# Patient Record
Sex: FEMALE | Race: WHITE | NOT HISPANIC OR LATINO | Employment: FULL TIME | URBAN - METROPOLITAN AREA
[De-identification: names, ages, dates, MRNs, and addresses within clinical notes are randomized per-mention and may not be internally consistent; named-entity substitution may affect disease eponyms.]

---

## 2016-11-08 LAB — TSH SERPL-ACNC: 4.55 UIU/ML (ref 0.35–4.94)

## 2017-11-28 LAB
PAP DATE - QUEST: NORMAL
PAP: NORMAL

## 2017-12-19 ENCOUNTER — TRANSFERRED RECORDS (OUTPATIENT)
Dept: HEALTH INFORMATION MANAGEMENT | Facility: CLINIC | Age: 27
End: 2017-12-19

## 2018-03-26 ENCOUNTER — TRANSFERRED RECORDS (OUTPATIENT)
Dept: HEALTH INFORMATION MANAGEMENT | Facility: CLINIC | Age: 28
End: 2018-03-26

## 2018-07-08 ENCOUNTER — RECORDS - HEALTHEAST (OUTPATIENT)
Dept: ADMINISTRATIVE | Facility: OTHER | Age: 28
End: 2018-07-08

## 2018-09-27 ENCOUNTER — TRANSFERRED RECORDS (OUTPATIENT)
Dept: HEALTH INFORMATION MANAGEMENT | Facility: CLINIC | Age: 28
End: 2018-09-27

## 2018-09-27 LAB
ABO/RH(D): NORMAL
BLD GP AB SCN SERPL QL: NEGATIVE
HBV SURFACE AG SERPL QL IA: NONREACTIVE
HIV 1/2 AGN ABY 4TH GEN WITH REFLEX: NON REACTIVE
RUBELLA ANTIBODY IGG QUANTITATIVE: 2.44 IU/ML
RUBELLA IMMUNITY: NORMAL
TREPONEMA ANTIBODIES: NEGATIVE
TSH SERPL-ACNC: 1.75 MCU/ML

## 2018-09-28 ENCOUNTER — TELEPHONE (OUTPATIENT)
Dept: OBGYN | Facility: CLINIC | Age: 28
End: 2018-09-28

## 2018-09-28 NOTE — TELEPHONE ENCOUNTER
Reason for call:  Other   Patient called regarding (reason for call): appointment  Additional comments: Patient is currently scheduled for a new prenatal visit with Dr. Chacko on 11/02/18. She is in the process of transferring her records from [facility] to [place]. The first day of her last period was 08/15/18. She has an ultrasound scheduled with Monique on 10/10/18 for her 8 week ultrasound. She is a high risk pregnancy. She prefers to establish her prenatal care with Dr. Chacko. She also wanted to schedule her next appointment at 12 weeks with Dr. Chacko but Dr. Chacko will not be in the office during patient's 12 weeks gestation. She made the appointment for 11/02/18 (11 weeks) for now but wants to discuss this with Dr. Chacko's nurse. She has some additional questions and wants to make sure this is a good game plan or if there are any other recommendations.    Phone number to reach patient:  Cell number on file:    Telephone Information:   Mobile [phone]       Best Time:  Any time    Can we leave a detailed message on this number?  YES     Allyn HOSKINS  Central Scheduler

## 2018-10-01 LAB
ERYTHROCYTE [DISTWIDTH] IN BLOOD BY AUTOMATED COUNT: 11.2 %
HCT VFR BLD AUTO: 38.5 %
HEMOGLOBIN: 13.4 G/DL (ref 11.7–15.7)
MCH RBC QN AUTO: 31.1 PG
MCHC RBC AUTO-ENTMCNC: 34.8 G/DL
MCV RBC AUTO: 89 FL
PLATELET # BLD AUTO: 245 10^9/L
RBC # BLD AUTO: 4.31 10^12/L
WBC # BLD AUTO: 11.7 10^9/L

## 2018-10-01 NOTE — TELEPHONE ENCOUNTER
Left pt detailed message.  Let her know that having her first/new prenatal visit with Dr. Chacko at 11 weeks gestation is fine.  Asked her if she has had a pregnancy confirmation through [facility] and had blood work done.  Will route to Dr. Chacko to see if she would like pt to keep her ultrasound appt through [facility] at 8 weeks gestation.    Flor Churchill RN

## 2018-10-02 NOTE — TELEPHONE ENCOUNTER
Yes, the patient can keep her appointment with me for 11/2 and she can plan to have her ultrasound with Monique on 10/10.    Please find out what makes her high risk.  I would like her pregnancy history as well.  Review current medications and problem list. Find out if she has had labs done.

## 2018-10-02 NOTE — TELEPHONE ENCOUNTER
Called and informed PT that her plan for her appointments is fine we will pull the records over after, and the high risk is due to  ankylosing spondylitis, but to get her records from the [facility].  Padma Hurtado CMA

## 2018-10-04 ENCOUNTER — OFFICE VISIT (OUTPATIENT)
Dept: FAMILY MEDICINE | Facility: CLINIC | Age: 28
End: 2018-10-04
Payer: COMMERCIAL

## 2018-10-04 VITALS
OXYGEN SATURATION: 99 % | DIASTOLIC BLOOD PRESSURE: 62 MMHG | RESPIRATION RATE: 20 BRPM | TEMPERATURE: 98.3 F | SYSTOLIC BLOOD PRESSURE: 108 MMHG | HEART RATE: 79 BPM | HEIGHT: 68 IN | WEIGHT: 211.3 LBS | BODY MASS INDEX: 32.02 KG/M2

## 2018-10-04 DIAGNOSIS — R07.0 THROAT PAIN: Primary | ICD-10-CM

## 2018-10-04 DIAGNOSIS — M45.0 ANKYLOSING SPONDYLITIS OF MULTIPLE SITES IN SPINE (H): ICD-10-CM

## 2018-10-04 LAB
DEPRECATED S PYO AG THROAT QL EIA: NORMAL
SPECIMEN SOURCE: NORMAL

## 2018-10-04 PROCEDURE — 87880 STREP A ASSAY W/OPTIC: CPT | Performed by: NURSE PRACTITIONER

## 2018-10-04 PROCEDURE — 87081 CULTURE SCREEN ONLY: CPT | Performed by: NURSE PRACTITIONER

## 2018-10-04 PROCEDURE — 99213 OFFICE O/P EST LOW 20 MIN: CPT | Performed by: NURSE PRACTITIONER

## 2018-10-04 NOTE — PATIENT INSTRUCTIONS
Rhinocort (Budesonide) is safe for cold symptoms with pregnancy.  Neutrogena face wash with Salicylic acid.             Viral Pharyngitis (Sore Throat)    You or your child have pharyngitis (sore throat). This infection is caused by a virus. It can cause throat pain that is worse when swallowing, aching all over, headache, and fever. The infection may be spread by coughing, kissing, or touching others after touching your mouth or nose. Antibiotic medicines do not work against viruses. They are not used for treating this illness.  Home care    If symptoms are severe, you or your child should rest at home. Return to work or school when you or your child feel well enough.     You or your child should drink plenty of fluids to prevent dehydration.    Use throat lozenges or numbing throat sprays to help reduce pain. Gargling with warm salt water will also help reduce throat pain. Dissolve 1/2 teaspoon of salt in 1 glass of warm water. Children can sip on juice or a popsicle. Children 5 years and older can also suck on a lollipop or hard candy.    Don t eat salty or spicy foods or give them to your child. These can be irritating to the throat.  Medicines for a child: You can give your child acetaminophen for fever, fussiness, or discomfort. In babies over 6 months of age, you may use ibuprofen instead of acetaminophen. If your child has chronic liver or kidney disease or ever had a stomach ulcer or GI bleeding, talk with your child s healthcare provider before giving these medicines. Aspirin should never be used by any child under 18 years of age who has a fever. It may cause severe liver damage.  Medicines for an adult: You may use acetaminophen or ibuprofen to control pain or fever, unless another medicine was prescribed for this. If you have chronic liver or kidney disease or ever had a stomach ulcer or GI bleeding, talk with your healthcare provider before using these medicines.  Follow-up care  Follow up with a  healthcare provider or our staff if you or your child are not getting better over the next week.  When to seek medical advice  Call your healthcare provider right away if any of these occur:    Fever as directed by your healthcare provider.  For children, seek care if:  ? Your child is of any age and has repeated fevers above 104 F (40 C).  ? Your child is younger than 2 years of age and has a fever of 100.4 F (38 C) for more than 1 day.  ? Your child is 2 years old or older and has a fever of 100.4 F (38 C) for more than 3 days.    New or worsening ear pain, sinus pain, or headache    Painful lumps in the back of neck    Stiff neck    Lymph nodes are getting larger    Can t swallow liquids, a lot of drooling, or can t open mouth wide due to throat pain    Signs of dehydration, such as very dark urine or no urine, sunken eyes, dizziness    Trouble breathing or noisy breathing    Muffled voice    New rash    Other symptoms are getting worse  Date Last Reviewed: 10/1/2017    3833-1781 [employer]. [address] [place] [address]. All rights reserved. This information is not intended as a substitute for professional medical care. Always follow your healthcare professional's instructions.        Self-Care for Sore Throats    Sore throats happen for many reasons, such as colds, allergies, and infections caused by viruses or bacteria. In any case, your throat becomes red and sore. Your goal for self-care is to reduce your discomfort while giving your throat a chance to heal.  Moisten and soothe your throat  Tips include the following:    Try a sip of water first thing after waking up.    Keep your throat moist by drinking 6 or more glasses of clear liquids every day.    Run a cool-air humidifier in your room overnight.    Avoid cigarette smoke.     Suck on throat lozenges, cough drops, hard candy, ice chips, or frozen fruit-juice bars. Use the sugar-free versions if your diet or medical condition  requires them.  Gargle to ease irritation  Gargling every hour or 2 can ease irritation. Try gargling with 1 of these solutions:    1/4 teaspoon of salt in 1/2 cup of warm water    An over-the-counter anesthetic gargle  Use medicine for more relief  Over-the-counter medicine can reduce sore throat symptoms. Ask your pharmacist if you have questions about which medicine to use:    Ease pain with anesthetic sprays. Aspirin or an aspirin substitute also helps. Remember, never give aspirin to anyone 18 or younger, or if you are already taking blood thinners.     For sore throats caused by allergies, try antihistamines to block the allergic reaction.    Remember: unless a sore throat is caused by a bacterial infection, antibiotics won t help you.  Prevent future sore throats  Prevention tips include the following:    Stop smoking or reduce contact with secondhand smoke. Smoke irritates the tender throat lining.    Limit contact with pets and with allergy-causing substances, such as pollen and mold.    When you re around someone with a sore throat or cold, wash your hands often to keep viruses or bacteria from spreading.    Don t strain your vocal cords.  Call your healthcare provider  Contact your healthcare provider if you have:    A temperature over 101 F (38.3 C)    White spots on the throat    Great difficulty swallowing    Trouble breathing    A skin rash    Recent exposure to someone else with strep bacteria    Severe hoarseness and swollen glands in the neck or jaw   Date Last Reviewed: 8/1/2016 2000-2017 [employer]. [address] [place] [address], [place] [address]. All rights reserved. This information is not intended as a substitute for professional medical care. Always follow your healthcare professional's instructions.

## 2018-10-04 NOTE — PROGRESS NOTES
"  SUBJECTIVE:   Wilma Palacios is a 28 year old female who presents to clinic today for the following health issues:      ENT Symptoms             Symptoms: cc Present Absent Comment   Fever/Chills  x  Chills    Fatigue  x     Muscle Aches   x    Eye Irritation   x    Sneezing   x    Nasal Abdiel/Drg  x     Sinus Pressure/Pain   x    Loss of smell  x     Dental pain   x    Sore Throat x x     Swollen Glands   x    Ear Pain/Fullness   x    Cough  x  nonproductive   Wheeze   x    Chest Pain  x  Mild this morning-tightness   Shortness of breath   x    Rash   x    Other    Nausea-pt is 7 weeks pregnant     Symptom duration:  2 days   Symptom severity:  mild   Treatments tried:  halls, gargle salt water, warm liquids   Contacts:  none     Patient is also experiencing acne and would like to know what is safe for her to use as she is currently pregnant.      Problem list and histories reviewed & adjusted, as indicated.  Additional history: as documented    Patient Active Problem List   Diagnosis     Ankylosing spondylitis of multiple sites in spine (H)     History reviewed. No pertinent surgical history.    Social History   Substance Use Topics     Smoking status: Former Smoker     Smokeless tobacco: Never Used     Alcohol use Yes      Comment: occ     History reviewed. No pertinent family history.        Reviewed and updated as needed this visit by clinical staff       Reviewed and updated as needed this visit by Provider         ROS:  Constitutional, HEENT, cardiovascular, pulmonary, gi and gu systems are negative, except as otherwise noted.    OBJECTIVE:     /62 (BP Location: Right arm, Patient Position: Sitting, Cuff Size: Adult Large)  Pulse 79  Temp 98.3  F (36.8  C) (Tympanic)  Resp 20  Ht 5' 7.91\" (1.725 m)  Wt 211 lb 4.8 oz (95.8 kg)  SpO2 99%  BMI 32.21 kg/m2  Body mass index is 32.21 kg/(m^2).  GENERAL: healthy, alert and no distress  EYES: Eyes grossly normal to inspection, PERRL and conjunctivae and " sclerae normal  HENT: normal cephalic/atraumatic, ear canals and TM's normal, nose and mouth without ulcers or lesions, oropharynx clear, oral mucous membranes moist, tonsillar erythema and sinuses: not tender  NECK: no adenopathy, no asymmetry, masses, or scars and thyroid normal to palpation  RESP: lungs clear to auscultation - no rales, rhonchi or wheezes  CV: regular rates and rhythm, normal S1 S2, no S3 or S4 and no murmur, click or rub  SKIN: no suspicious lesions or rashes  NEURO: Normal strength and tone, mentation intact and speech normal  PSYCH: mentation appears normal, affect normal/bright    Diagnostic Test Results:  Results for orders placed or performed in visit on 10/04/18 (from the past 24 hour(s))   Strep, Rapid Screen   Result Value Ref Range    Specimen Description Throat     Rapid Strep A Screen       NEGATIVE: No Group A streptococcal antigen detected by immunoassay, await culture report.       ASSESSMENT/PLAN:       ICD-10-CM    1. Pharyngitis J02.9 Strep, Rapid Screen     Beta strep group A culture   2. Ankylosing spondylitis of multiple sites in spine (H) M45.0        FUTURE APPOINTMENTS:       - Follow up in 1 week for persistent symptoms, sooner for new or worsening symptoms. See patient instructions.    Patient Instructions     Rhinocort (Budesonide) is safe for cold symptoms with pregnancy.  Neutrogena face wash with Salicylic acid.             Viral Pharyngitis (Sore Throat)    You or your child have pharyngitis (sore throat). This infection is caused by a virus. It can cause throat pain that is worse when swallowing, aching all over, headache, and fever. The infection may be spread by coughing, kissing, or touching others after touching your mouth or nose. Antibiotic medicines do not work against viruses. They are not used for treating this illness.  Home care    If symptoms are severe, you or your child should rest at home. Return to work or school when you or your child feel well  enough.     You or your child should drink plenty of fluids to prevent dehydration.    Use throat lozenges or numbing throat sprays to help reduce pain. Gargling with warm salt water will also help reduce throat pain. Dissolve 1/2 teaspoon of salt in 1 glass of warm water. Children can sip on juice or a popsicle. Children 5 years and older can also suck on a lollipop or hard candy.    Don t eat salty or spicy foods or give them to your child. These can be irritating to the throat.  Medicines for a child: You can give your child acetaminophen for fever, fussiness, or discomfort. In babies over 6 months of age, you may use ibuprofen instead of acetaminophen. If your child has chronic liver or kidney disease or ever had a stomach ulcer or GI bleeding, talk with your child s healthcare provider before giving these medicines. Aspirin should never be used by any child under 18 years of age who has a fever. It may cause severe liver damage.  Medicines for an adult: You may use acetaminophen or ibuprofen to control pain or fever, unless another medicine was prescribed for this. If you have chronic liver or kidney disease or ever had a stomach ulcer or GI bleeding, talk with your healthcare provider before using these medicines.  Follow-up care  Follow up with a healthcare provider or our staff if you or your child are not getting better over the next week.  When to seek medical advice  Call your healthcare provider right away if any of these occur:    Fever as directed by your healthcare provider.  For children, seek care if:  ? Your child is of any age and has repeated fevers above 104 F (40 C).  ? Your child is younger than 2 years of age and has a fever of 100.4 F (38 C) for more than 1 day.  ? Your child is 2 years old or older and has a fever of 100.4 F (38 C) for more than 3 days.    New or worsening ear pain, sinus pain, or headache    Painful lumps in the back of neck    Stiff neck    Lymph nodes are getting  larger    Can t swallow liquids, a lot of drooling, or can t open mouth wide due to throat pain    Signs of dehydration, such as very dark urine or no urine, sunken eyes, dizziness    Trouble breathing or noisy breathing    Muffled voice    New rash    Other symptoms are getting worse  Date Last Reviewed: 10/1/2017    2558-7690 [employer]. [address] [place] [address], [place] [address]. All rights reserved. This information is not intended as a substitute for professional medical care. Always follow your healthcare professional's instructions.        Self-Care for Sore Throats    Sore throats happen for many reasons, such as colds, allergies, and infections caused by viruses or bacteria. In any case, your throat becomes red and sore. Your goal for self-care is to reduce your discomfort while giving your throat a chance to heal.  Moisten and soothe your throat  Tips include the following:    Try a sip of water first thing after waking up.    Keep your throat moist by drinking 6 or more glasses of clear liquids every day.    Run a cool-air humidifier in your room overnight.    Avoid cigarette smoke.     Suck on throat lozenges, cough drops, hard candy, ice chips, or frozen fruit-juice bars. Use the sugar-free versions if your diet or medical condition requires them.  Gargle to ease irritation  Gargling every hour or 2 can ease irritation. Try gargling with 1 of these solutions:    1/4 teaspoon of salt in 1/2 cup of warm water    An over-the-counter anesthetic gargle  Use medicine for more relief  Over-the-counter medicine can reduce sore throat symptoms. Ask your pharmacist if you have questions about which medicine to use:    Ease pain with anesthetic sprays. Aspirin or an aspirin substitute also helps. Remember, never give aspirin to anyone 18 or younger, or if you are already taking blood thinners.     For sore throats caused by allergies, try antihistamines to block the allergic reaction.    Remember:  unless a sore throat is caused by a bacterial infection, antibiotics won t help you.  Prevent future sore throats  Prevention tips include the following:    Stop smoking or reduce contact with secondhand smoke. Smoke irritates the tender throat lining.    Limit contact with pets and with allergy-causing substances, such as pollen and mold.    When you re around someone with a sore throat or cold, wash your hands often to keep viruses or bacteria from spreading.    Don t strain your vocal cords.  Call your healthcare provider  Contact your healthcare provider if you have:    A temperature over 101 F (38.3 C)    White spots on the throat    Great difficulty swallowing    Trouble breathing    A skin rash    Recent exposure to someone else with strep bacteria    Severe hoarseness and swollen glands in the neck or jaw   Date Last Reviewed: 8/1/2016 2000-2017 [employer]. [address] [place] [address], [place] [address]. All rights reserved. This information is not intended as a substitute for professional medical care. Always follow your healthcare professional's instructions.            SALIMA Perales [facility]

## 2018-10-04 NOTE — MR AVS SNAPSHOT
After Visit Summary   10/4/2018    Wilma Palacios    MRN: [id number]           Patient Information     Date Of Birth          1990        Visit Information        Provider Department      10/4/2018 9:20 AM Kasey Marquez APRN [facility]        Today's Diagnoses     Pharyngitis    -  1    Ankylosing spondylitis of multiple sites in spine (H)          Care Instructions    Rhinocort (Budesonide) is safe for cold symptoms with pregnancy.  Neutrogena face wash with Salicylic acid.             Viral Pharyngitis (Sore Throat)    You or your child have pharyngitis (sore throat). This infection is caused by a virus. It can cause throat pain that is worse when swallowing, aching all over, headache, and fever. The infection may be spread by coughing, kissing, or touching others after touching your mouth or nose. Antibiotic medicines do not work against viruses. They are not used for treating this illness.  Home care    If symptoms are severe, you or your child should rest at home. Return to work or school when you or your child feel well enough.     You or your child should drink plenty of fluids to prevent dehydration.    Use throat lozenges or numbing throat sprays to help reduce pain. Gargling with warm salt water will also help reduce throat pain. Dissolve 1/2 teaspoon of salt in 1 glass of warm water. Children can sip on juice or a popsicle. Children 5 years and older can also suck on a lollipop or hard candy.    Don t eat salty or spicy foods or give them to your child. These can be irritating to the throat.  Medicines for a child: You can give your child acetaminophen for fever, fussiness, or discomfort. In babies over 6 months of age, you may use ibuprofen instead of acetaminophen. If your child has chronic liver or kidney disease or ever had a stomach ulcer or GI bleeding, talk with your child s healthcare provider before giving these medicines. Aspirin should never be used by any  child under 18 years of age who has a fever. It may cause severe liver damage.  Medicines for an adult: You may use acetaminophen or ibuprofen to control pain or fever, unless another medicine was prescribed for this. If you have chronic liver or kidney disease or ever had a stomach ulcer or GI bleeding, talk with your healthcare provider before using these medicines.  Follow-up care  Follow up with a healthcare provider or our staff if you or your child are not getting better over the next week.  When to seek medical advice  Call your healthcare provider right away if any of these occur:    Fever as directed by your healthcare provider.  For children, seek care if:  ? Your child is of any age and has repeated fevers above 104 F (40 C).  ? Your child is younger than 2 years of age and has a fever of 100.4 F (38 C) for more than 1 day.  ? Your child is 2 years old or older and has a fever of 100.4 F (38 C) for more than 3 days.    New or worsening ear pain, sinus pain, or headache    Painful lumps in the back of neck    Stiff neck    Lymph nodes are getting larger    Can t swallow liquids, a lot of drooling, or can t open mouth wide due to throat pain    Signs of dehydration, such as very dark urine or no urine, sunken eyes, dizziness    Trouble breathing or noisy breathing    Muffled voice    New rash    Other symptoms are getting worse  Date Last Reviewed: 10/1/2017    3609-4041 [employer]. [address] [place] [address]. All rights reserved. This information is not intended as a substitute for professional medical care. Always follow your healthcare professional's instructions.        Self-Care for Sore Throats    Sore throats happen for many reasons, such as colds, allergies, and infections caused by viruses or bacteria. In any case, your throat becomes red and sore. Your goal for self-care is to reduce your discomfort while giving your throat a chance to heal.  Moisten and soothe your  throat  Tips include the following:    Try a sip of water first thing after waking up.    Keep your throat moist by drinking 6 or more glasses of clear liquids every day.    Run a cool-air humidifier in your room overnight.    Avoid cigarette smoke.     Suck on throat lozenges, cough drops, hard candy, ice chips, or frozen fruit-juice bars. Use the sugar-free versions if your diet or medical condition requires them.  Gargle to ease irritation  Gargling every hour or 2 can ease irritation. Try gargling with 1 of these solutions:    1/4 teaspoon of salt in 1/2 cup of warm water    An over-the-counter anesthetic gargle  Use medicine for more relief  Over-the-counter medicine can reduce sore throat symptoms. Ask your pharmacist if you have questions about which medicine to use:    Ease pain with anesthetic sprays. Aspirin or an aspirin substitute also helps. Remember, never give aspirin to anyone 18 or younger, or if you are already taking blood thinners.     For sore throats caused by allergies, try antihistamines to block the allergic reaction.    Remember: unless a sore throat is caused by a bacterial infection, antibiotics won t help you.  Prevent future sore throats  Prevention tips include the following:    Stop smoking or reduce contact with secondhand smoke. Smoke irritates the tender throat lining.    Limit contact with pets and with allergy-causing substances, such as pollen and mold.    When you re around someone with a sore throat or cold, wash your hands often to keep viruses or bacteria from spreading.    Don t strain your vocal cords.  Call your healthcare provider  Contact your healthcare provider if you have:    A temperature over 101 F (38.3 C)    White spots on the throat    Great difficulty swallowing    Trouble breathing    A skin rash    Recent exposure to someone else with strep bacteria    Severe hoarseness and swollen glands in the neck or jaw   Date Last Reviewed: 8/1/2016 2000-2017 The  "[employer]. [address] [place] [address]. All rights reserved. This information is not intended as a substitute for professional medical care. Always follow your healthcare professional's instructions.                Follow-ups after your visit        Your next 10 appointments already scheduled     2018  3:00 PM CDT   New Prenatal with Rae Chacko MD   [facility]    [address] [address] [place] [id number] [phone]              Who to contact     Normal or non-critical lab and imaging results will be communicated to you by MyChart, letter or phone within 4 business days after the clinic has received the results. If you do not hear from us within 7 days, please contact the clinic through MyChart or phone. If you have a critical or abnormal lab result, we will notify you by phone as soon as possible.  Submit refill requests through [employer] or call your pharmacy and they will forward the refill request to us. Please allow 3 business days for your refill to be completed.          If you need to speak with a  for additional information , please call: [phone]             Additional Information About Your Visit        [employer] Information     [employer] lets you send messages to your doctor, view your test results, renew your prescriptions, schedule appointments and more. To sign up, go to [email or web address] . Click on \"Log in\" on the left side of the screen, which will take you to the Welcome page. Then click on \"Sign up Now\" on the right side of the page.     You will be asked to enter the access code listed below, as well as some personal information. Please follow the directions to create your username and password.     Your access code is: [email or web address]  Expires: 2019  9:40 AM     Your access code will  in 90 days. If you need help or a new code, please call your [place] clinic or " "[phone].        Care EveryWhere ID     This is your Care EveryWhere ID. This could be used by other organizations to access your [facility] medical records  [id number]        Your Vitals Were     Pulse Temperature Respirations Height Pulse Oximetry BMI (Body Mass Index)    79 98.3  F (36.8  C) (Tympanic) 20 5' 7.91\" (1.725 m) 99% 32.21 kg/m2       Blood Pressure from Last 3 Encounters:   10/04/18 108/62    Weight from Last 3 Encounters:   10/04/18 211 lb 4.8 oz (95.8 kg)              We Performed the Following     Strep, Rapid Screen        Primary Care Provider Fax #    Physician No Ref-Primary [phone]       No address on file        Equal Access to Services     MARKUS FRANCIS : Hadii familia abdullahio Dejuan, waaxda luqadaha, qaybta kaalmada adeegyada, nany davila . So [facility] [phone].    ATENCIÓN: Si habla español, tiene a sahni disposición servicios gratuitos de asistencia lingüística. Llame al [phone].    We comply with applicable federal civil rights laws and [place] laws. We do not discriminate on the basis of race, color, national origin, age, disability, sex, sexual orientation, or gender identity.            Thank you!     Thank you for choosing [facility]  for your care. Our goal is always to provide you with excellent care. Hearing back from our patients is one way we can continue to improve our services. Please take a few minutes to complete the written survey that you may receive in the mail after your visit with us. Thank you!             Your Updated Medication List - Protect others around you: Learn how to safely use, store and throw away your medicines at www.disposemymeds.org.          This list is accurate as of 10/4/18  9:40 AM.  Always use your most recent med list.                   Brand Name Dispense Instructions for use Diagnosis    HUMIRA PEN 40 MG/0.8ML pen kit   Generic drug:  adalimumab           PRENATAL VITAMINS PO             "

## 2018-10-05 LAB
BACTERIA SPEC CULT: NORMAL
SPECIMEN SOURCE: NORMAL

## 2018-11-02 ENCOUNTER — PRENATAL OFFICE VISIT (OUTPATIENT)
Dept: OBGYN | Facility: CLINIC | Age: 28
End: 2018-11-02
Payer: COMMERCIAL

## 2018-11-02 VITALS
SYSTOLIC BLOOD PRESSURE: 111 MMHG | WEIGHT: 216.9 LBS | OXYGEN SATURATION: 98 % | HEART RATE: 79 BPM | BODY MASS INDEX: 33.06 KG/M2 | DIASTOLIC BLOOD PRESSURE: 78 MMHG

## 2018-11-02 DIAGNOSIS — O99.280 HYPOTHYROID IN PREGNANCY, ANTEPARTUM: ICD-10-CM

## 2018-11-02 DIAGNOSIS — O09.91 HIGH-RISK PREGNANCY IN FIRST TRIMESTER: ICD-10-CM

## 2018-11-02 DIAGNOSIS — E03.9 HYPOTHYROID IN PREGNANCY, ANTEPARTUM: ICD-10-CM

## 2018-11-02 DIAGNOSIS — N89.8 VAGINAL ODOR: Primary | ICD-10-CM

## 2018-11-02 PROBLEM — M46.1 SACROILIAC INFLAMMATION (H): Status: ACTIVE | Noted: 2018-11-02

## 2018-11-02 PROBLEM — G89.29 CHRONIC CHEST WALL PAIN: Status: ACTIVE | Noted: 2018-11-02

## 2018-11-02 PROBLEM — O09.529 HIGH-RISK PREGNANCY, ELDERLY MULTIGRAVIDA, UNSPECIFIED TRIMESTER: Status: ACTIVE | Noted: 2018-11-02

## 2018-11-02 PROBLEM — F41.9 ANXIETY: Status: ACTIVE | Noted: 2017-12-12

## 2018-11-02 PROBLEM — R07.89 CHRONIC CHEST WALL PAIN: Status: ACTIVE | Noted: 2018-11-02

## 2018-11-02 PROBLEM — M47.899 HLA-B27 SPONDYLOARTHROPATHY: Status: ACTIVE | Noted: 2018-11-02

## 2018-11-02 LAB
GLUCOSE 1H P 50 G GLC PO SERPL-MCNC: 115 MG/DL (ref 60–129)
SPECIMEN SOURCE: NORMAL
TSH SERPL DL<=0.005 MIU/L-ACNC: 1.23 MU/L (ref 0.4–4)
WET PREP SPEC: NORMAL

## 2018-11-02 PROCEDURE — 84443 ASSAY THYROID STIM HORMONE: CPT | Performed by: OBSTETRICS & GYNECOLOGY

## 2018-11-02 PROCEDURE — 82950 GLUCOSE TEST: CPT | Performed by: OBSTETRICS & GYNECOLOGY

## 2018-11-02 PROCEDURE — 99207 ZZC FIRST OB VISIT: CPT | Performed by: OBSTETRICS & GYNECOLOGY

## 2018-11-02 PROCEDURE — 36415 COLL VENOUS BLD VENIPUNCTURE: CPT | Performed by: OBSTETRICS & GYNECOLOGY

## 2018-11-02 PROCEDURE — 87210 SMEAR WET MOUNT SALINE/INK: CPT | Performed by: OBSTETRICS & GYNECOLOGY

## 2018-11-02 RX ORDER — LEVOTHYROXINE SODIUM 112 UG/1
112 TABLET ORAL
COMMUNITY
Start: 2018-07-01 | End: 2020-11-19

## 2018-11-02 RX ORDER — POLYETHYLENE GLYCOL 3350 17 G/17G
17 POWDER, FOR SOLUTION ORAL
COMMUNITY
Start: 2018-10-26 | End: 2018-11-30

## 2018-11-02 RX ORDER — ALBUTEROL SULFATE 90 UG/1
2 AEROSOL, METERED RESPIRATORY (INHALATION)
COMMUNITY
Start: 2017-05-14 | End: 2018-11-02

## 2018-11-02 RX ORDER — ALBUTEROL SULFATE 90 UG/1
2 AEROSOL, METERED RESPIRATORY (INHALATION)
COMMUNITY
Start: 2017-12-19 | End: 2021-09-09

## 2018-11-02 RX ORDER — ALPRAZOLAM 0.5 MG
0.25 TABLET ORAL
COMMUNITY
End: 2018-11-02

## 2018-11-02 RX ORDER — ONDANSETRON 4 MG/1
TABLET, ORALLY DISINTEGRATING ORAL
Refills: 0 | COMMUNITY
Start: 2018-07-30 | End: 2018-11-30

## 2018-11-02 RX ORDER — PRENATAL VIT 91/IRON/FOLIC/DHA 28-975-200
COMBINATION PACKAGE (EA) ORAL
COMMUNITY
Start: 2017-10-19 | End: 2021-01-15

## 2018-11-02 RX ORDER — FLUTICASONE PROPIONATE 50 MCG
1 SPRAY, SUSPENSION (ML) NASAL
COMMUNITY
Start: 2017-12-19 | End: 2021-01-15

## 2018-11-02 ASSESSMENT — ANXIETY QUESTIONNAIRES
7. FEELING AFRAID AS IF SOMETHING AWFUL MIGHT HAPPEN: NOT AT ALL
6. BECOMING EASILY ANNOYED OR IRRITABLE: MORE THAN HALF THE DAYS
2. NOT BEING ABLE TO STOP OR CONTROL WORRYING: SEVERAL DAYS
3. WORRYING TOO MUCH ABOUT DIFFERENT THINGS: SEVERAL DAYS
GAD7 TOTAL SCORE: 7
5. BEING SO RESTLESS THAT IT IS HARD TO SIT STILL: NOT AT ALL
1. FEELING NERVOUS, ANXIOUS, OR ON EDGE: SEVERAL DAYS
IF YOU CHECKED OFF ANY PROBLEMS ON THIS QUESTIONNAIRE, HOW DIFFICULT HAVE THESE PROBLEMS MADE IT FOR YOU TO DO YOUR WORK, TAKE CARE OF THINGS AT HOME, OR GET ALONG WITH OTHER PEOPLE: SOMEWHAT DIFFICULT

## 2018-11-02 ASSESSMENT — PATIENT HEALTH QUESTIONNAIRE - PHQ9
SUM OF ALL RESPONSES TO PHQ QUESTIONS 1-9: 8
5. POOR APPETITE OR OVEREATING: MORE THAN HALF THE DAYS

## 2018-11-02 NOTE — PROGRESS NOTES
28 year old  at 11w2d presents for New OB appointment.  Estimated Date of Delivery: May 22, 2019 by ultrasound which is consistent with LMP.   US from [facility] noted below.      Constipation.   No vaginal bleeding, no leaking fluid, no contractions.      Electronic chart, including labs and imaging reviewed.    Last PHQ-9 score on record= No flowsheet data found.    Obstetric History  Obstetric History       T0      L0     SAB0   TAB0   Ectopic0   Multiple0   Live Births0       # Outcome Date GA Lbr José/2nd Weight Sex Delivery Anes PTL Lv   1 Current                   Gynecologic History  History of sexually transmitted disease:  Remote history of chlamydia, no risk at this time.   History of abnormal Pap: None    Most Recent Immunizations   Administered Date(s) Administered     DTAP (<7y) 1997     HepA, Unspecified 2008     HepB, Unspecified 1995     Hib (PRP-T) 1993     Hpv, Unspecified  2009     Influenza (IIV3) PF 2017     MMR 2003     Meningococcal,unspecified 2006     OPV, trivalent, live 1993     Poliovirus, inactivated (IPV) 1997     TD (ADULT, 7+) 2003     Tdap (Adult) Unspecified Formulation 2013     Varicella 07/15/2004        Patient Active Problem List   Diagnosis     Ankylosing spondylitis of multiple sites in spine (H)     High-risk pregnancy     Allergic rhinitis     Anxiety     Chronic chest wall pain     HLA-B27 spondyloarthropathy     Hypothyroidism     Sacroiliac inflammation (H)     Hypothyroid in pregnancy, antepartum       Current Outpatient Prescriptions   Medication     albuterol (PROAIR HFA/PROVENTIL HFA/VENTOLIN HFA) 108 (90 Base) MCG/ACT inhaler     fluticasone (FLONASE) 50 MCG/ACT spray     HUMIRA PEN 40 MG/0.8ML pen kit     levothyroxine (SYNTHROID/LEVOTHROID) 112 MCG tablet     ondansetron (ZOFRAN-ODT) 4 MG ODT tab     polyethylene glycol (MIRALAX/GLYCOLAX) powder     Prenatal Multivit-Min-Fe-FA  (PRENATAL VITAMINS PO)     terbinafine (LAMISIL) 1 % cream     diclofenac (VOLTAREN) 1 % GEL topical gel     [DISCONTINUED] albuterol (PROAIR HFA/PROVENTIL HFA/VENTOLIN HFA) 108 (90 Base) MCG/ACT inhaler     No current facility-administered medications for this visit.        Allergies   Allergen Reactions     Ascorbate Anaphylaxis     All Fruits.     Nuts Swelling and Nausea and Vomiting     No Clinical Screening - See Comments Unknown     Sodium Hypochlorite Hives     Sulfa Drugs        History  History reviewed. No pertinent past medical history.  History reviewed. No pertinent surgical history.    Social History     Social History     Marital status: Unknown     Spouse name: N/A     Number of children: N/A     Years of education: N/A     Occupational History     Not on file.     Social History Main Topics     Smoking status: Former Smoker     Smokeless tobacco: Never Used     Alcohol use Yes      Comment:  not pg     Drug use: No     Sexual activity: Yes     Partners: Male     Other Topics Concern     Not on file     Social History Narrative       ROS - Please see HPI, otherwise 10pt ROS negative.      Physical Exam  Vitals: /78 (Patient Position: Sitting, Cuff Size: Adult Regular)  Pulse 79  Wt 216 lb 14.4 oz (98.4 kg)  LMP 08/15/2018  SpO2 98%  BMI 33.06 kg/m2  BMI= Body mass index is 33.06 kg/(m^2).  Gen: Alert and oriented times 3, no acute distress.  Well developed, well nourished, pleasant.    Neck: Supple, no masses.  No thyromegaly.  Chest:  Non labored.  Clear to auscultation bilaterally.    Heart: Regular, normal S1, S2.  No murmurs.   Abdomen: Soft, nontender, nondistended.  No palpable masses.    :  Normal female external genitalia, [place] stage V.  No lesions.  Speculum exam reveals a normal vaginal vault, normal cervix.  No abnormal discharge.  Bimanual exam reveals a normal, mobile, nontender uterus, size consistent with dates.  No cervical motion tenderness.  Adnexa nontender with no  palpable masses.   Extremities:  Nontender, no edema.      Labs at [facility] reviewed.      Assessment and Plan:  28 year old  with IUP at 11w2d.        ICD-10-CM    1. Vaginal odor N89.8 Wet prep   2. High-risk pregnancy in first trimester O09.91 Glucose tolerance gest screen 1 hour   3. Hypothyroid in pregnancy, antepartum O99.280 TSH with free T4 reflex    E03.9 TSH with free T4 reflex       Constipation - recommend Miralax and stool softeners.  Supportive care discussed  Hypothyroid - no recent dose change.  Check next visit.   Refer to MFM at next visit for level 2 US and consultation regarding Humira in pregnancy.   Rheumatology- Kassy Duffy at [facility]   Discussed genetic screening options.  Discussed labs and ultrasound,   all questions answered.  Early GCT  needed  Discussed benefits of breastfeeding  Folder given, outlining physician coverage, exercise, weight gain, schedule of visits, routine and indicated ultrasounds, prenatal vitamins and childbirth education.    Body mass index is 33.06 kg/(m^2). -  (BMI >30) 28-40 lbs (BMI <18.5)   Return in 4 weeks for routine OB care      30 minutes was spent face to face with the patient today discussing her history, diagnosis, and follow-up plan as noted above.  Over 50% of the visit was spent in counseling and coordination of care.    Rae Chacko MD FACOG       US OB ANY TRI TV (10/10/2018 4:44 PM)  US OB ANY TRI TV (10/10/2018 4:44 PM)   Impressions Performed At   1. [place], viable, intrauterine pregnancy.    2. Ultrasound BAUTISTA is 19 with a gestational age of 8 weeks 0 days.    3. No adnexal masses are seen.        Chelle Gonzalez MD 10/11/2018 8:44 AM         Counts include 234 beds at the [facility]    [address]     [address]    [facility] [address] [phone]           US OB ANY TRI TV (10/10/2018 4:44 PM)   Narrative Performed At   This result has an attachment that is not available.    Early Pregnancy Ultrasound    Date  of exam: 10/10/2018  Indication for exam: dating  Requesting Provider: Ahsan Dunlap MD    TECHNIQUE:   Transabdominal scan was not performed. Transvaginal scan was performed.    FINDINGS:   The uterus is normal size.   The myometrium is homogenous.  There are no myomas noted.    There is a single, living, intrauterine pregnancy.   The crown-rump length is 1.53 cm, consistent with an ultrasound   gestational age of 8 weeks 0 days.  The yolk sac is identified, appears normal, and measures 3.9 mm.  Fetal heart activity is present with a rate of 162 beats per minute.    Adnexal structures are normal.  Free fluid in the cul de sac: none          Letha Anthony MD - 2018 1:31 PM CDT  [place]  [facility] - Consult Letter  Date of visit: 18     Dr. Kassy Duffy  Rheumatology  [facility]  ()[phone]    Dr. Grace Rios  Obstetrics and Gynecology  [facility]  ()[phone]    Ms. Leyda Palomo, [facility]  (f)[phone]    Dr. Cale Rojas  [facility]    Re: Wilma Palacios   : [birth year]     Dear Colleagues,    We had the pleasure of seeing Wilma Palacios for a Maternal-Fetal Medicine preconception consultation today secondary to her history of ankylosing spondylitis. Wilma and her  are contemplating pregnancy in the near future and would like some guidance on how to manage AS in pregnancy. As you know, Wilma is a 28 year old G0P whose history is as follows    PAST OBSTETRIC HX: Nullip    PAST GYNECOLOGIC HX: Remote history of treated chlamydia. No abnormal pap smears    PAST MEDICAL HX:   ? Ankylosing spondylitis - Wilma had been having severe joint pain (mostly of her pelvis) for years, as well as chest wall tightness. She was formally diagnosed with AS in late 2017 and is followed by Dr. Duffy at the [facility]. Wilma has been on Humira  since her diagnosis and has overall good control of her symptoms. Her last flare was early-April, when her Humira was held due to suspected pregnancy. Her symptoms resolved shortly after resuming Humira.    PAST SURGICAL HX: None    ALLERGIES: Vitamin C causes anaphylaxis, sulfa drugs cause hives    MEDICATIONS: Humira every 2 weeks; synthroid 112mcg daily    SOCIAL HX: No tobacco, alcohol, or illicit drug use.     FAMILY HX: No history of bleeding disorders, birth defects, Down Syndrome, or intellectual disability    REVIEW OF SYSTEMS:  Constitutional: no f/c, stable weight.   CV: no CP, no palpitations  Resp: no SOB/MÁRQUEZ, no cough  GI: No nausea or vomiting. No constipation or diarrhea.    Laboratory studies/Tests: None     Problem list:  1. Ankylosing spondylitis - Women with AS are prone to flares in pregnancy. Data indicate that SA may not be as quiescent in pregnancy as other autoimmune/inflammatory conditions are. The risk of flaring in pregnancy is roughly 25%; patients are at risk for flares in the post-partum period as well. The two main risk factors for flares during pregnancy are active disease at the time of conception and early pregnancy and discontinuation of TNF inhibitors (Humira) in early pregnancy. We strongly recommend that Wilma continue her Humira while pregnant. Humira does cross the placenta and is detectable in cord blood. Therefore, the traditional recommendation is to discontinue Humira at 30 weeks due to the risk of  immune suppression. If Dr. Duffy feels that Wilma would continue to benefit from Humira beyond 30 weeks, Pediatrics should be notified. The administration of live vaccines should be postponed until anti-TNF concentrations in the infant are negative. Short term, high dose prednisone can also be considered for AS flares.    Non pharmacologic interventions (like physical therapy) should also be utilized. We recommended that Wilma begin physical therapy in combination  with home exercises prior to pregnancy. As most of her symptoms are in her pelvic joints, we anticipate that Wilma may experience more discomfort during pregnancy and PT can help to address this. Sydney was concerned that pregnancy may not be safe due to her pelvic pain; we do not feel that this poses a contraindication to pregnancy. Nor is this a contraindication to a trial of labor. Wilma is also working on weight optimization prior to pregnancy.    At Wilma s request we also discussed the risk of ectopic pregnancy (higher in women with a history of chlamydia, unrelated to AS), prenatal diagnosis of AS (not available), and inheritance of ADHD (multifactorial).    Summary of Recommendations  ? Continue Humira in pregnancy. Consider discontinuation at 30 weeks  ? Follow up with primary OB for pregnancy confirmation/ultrasound after positive pregnancy test   ? Return to [facility] for follow up consult once pregnancy is confirmed. Anticipate level 2 ultrasound at 20 weeks  ? Multidisciplinary care with MPP, OB, and Rheumatology in pregnancy    Thank you for allowing us to participate in Wilma s care. We look forward to seeing her in hopefully, the near future.    Warm regards,    Letha Anthony MD  [place]  [facility]  2018 1:31 PM

## 2018-11-02 NOTE — MR AVS SNAPSHOT
After Visit Summary   11/2/2018    Wilma Palacios    MRN: [id number]           Patient Information     Date Of Birth          1990        Visit Information        Provider Department      11/2/2018 3:00 PM Rae Chacko MD [facility] [place] [facility]        Today's Diagnoses     Vaginal odor    -  1    High-risk pregnancy in first trimester        Hypothyroid in pregnancy, antepartum          Care Instructions      Treatment of Nausea and Vomiting in Pregnancy    Stop prenatal vitamin and start a folic acid supplement only until nausea improves (folic acid 400 micrograms by mouth daily)    Ginger capsules 250 mg by mouth daily     Consider acupressure with wrist bands    Vitamin B6 (pyridoxine) 10-25 mg by mouth 3-4 times per day.  This may be taken in combination with doxylamine.  Doxylamine 25 mg by mouth every night before bed.  You may also take doxylamine 12.5 mg (half tab) in the am and in the afternoon as needed.      It is important to stay hydrated.  Please let us know if your symptoms worsen or do not improve with the treatment plan listed above.      You may also take stool softeners (colace, or like medication).          Follow-ups after your visit        Follow-up notes from your care team     Return in about 4 weeks (around 11/30/2018) for OB Visit.      Future tests that were ordered for you today     Open Future Orders        Priority Expected Expires Ordered    TSH with free T4 reflex Routine  11/2/2019 11/2/2018            Who to contact     If you have questions or need follow up information about today's clinic visit or your schedule please contact [facility] directly at [phone].  Normal or non-critical lab and imaging results will be communicated to you by MyChart, letter or phone within 4 business days after the clinic has received the results. If you do not hear from us within 7 days, please contact the clinic through MyChart or phone. If you have  "a critical or abnormal lab result, we will notify you by phone as soon as possible.  Submit refill requests through [employer] or call your pharmacy and they will forward the refill request to us. Please allow 3 business days for your refill to be completed.          Additional Information About Your Visit        [employer] Information     [employer] lets you send messages to your doctor, view your test results, renew your prescriptions, schedule appointments and more. To sign up, go to [email or web address] . Click on \"Log in\" on the left side of the screen, which will take you to the Welcome page. Then click on \"Sign up Now\" on the right side of the page.     You will be asked to enter the access code listed below, as well as some personal information. Please follow the directions to create your username and password.     Your access code is: [email or web address]  Expires: 2019  9:40 AM     Your access code will  in 90 days. If you need help or a new code, please call your [place] clinic or [phone].        Care EveryWhere ID     This is your Care EveryWhere ID. This could be used by other organizations to access your [place] medical records  [id number]        Your Vitals Were     Pulse Last Period Pulse Oximetry BMI (Body Mass Index)          79 08/15/2018 98% 33.06 kg/m2         Blood Pressure from Last 3 Encounters:   18 111/78   10/04/18 108/62    Weight from Last 3 Encounters:   18 216 lb 14.4 oz (98.4 kg)   10/04/18 211 lb 4.8 oz (95.8 kg)              We Performed the Following     Glucose tolerance gest screen 1 hour     Wet prep          Today's Medication Changes          These changes are accurate as of 18  3:44 PM.  If you have any questions, ask your nurse or doctor.               These medicines have changed or have updated prescriptions.        Dose/Directions    albuterol 108 (90 Base) MCG/ACT inhaler   Commonly known as:  PROAIR HFA/PROVENTIL HFA/VENTOLIN HFA   This may have " changed:  Another medication with the same name was removed. Continue taking this medication, and follow the directions you see here.   Changed by:  Rae Chacko MD        Dose:  2 puff   Inhale 2 puffs into the lungs   Refills:  0         Stop taking these medicines if you haven't already. Please contact your care team if you have questions.     ALPRAZolam 0.5 MG tablet   Commonly known as:  XANAX   Stopped by:  Rae Chacko MD                    Primary Care Provider Fax #    Physician No Ref-Primary [phone]       No address on file        Equal Access to Services     [facility]: Hadii aad ku hadasho Soomaali, waaxda luqadaha, qaybta kaalmada adeegyada, waxay idiin hayaan adeisaac khatif davila . So [facility] [phone].    ATENCIÓN: Si habla español, tiene a sahni disposición servicios gratuitos de asistencia lingüística. [facility] [phone].    We comply with applicable federal civil rights laws and [place] laws. We do not discriminate on the basis of race, color, national origin, age, disability, sex, sexual orientation, or gender identity.            Thank you!     Thank you for choosing [facility]  for your care. Our goal is always to provide you with excellent care. Hearing back from our patients is one way we can continue to improve our services. Please take a few minutes to complete the written survey that you may receive in the mail after your visit with us. Thank you!             Your Updated Medication List - Protect others around you: Learn how to safely use, store and throw away your medicines at www.disposemymeds.org.          This list is accurate as of 11/2/18  3:44 PM.  Always use your most recent med list.                   Brand Name Dispense Instructions for use Diagnosis    albuterol 108 (90 Base) MCG/ACT inhaler    PROAIR HFA/PROVENTIL HFA/VENTOLIN HFA     Inhale 2 puffs into the lungs        diclofenac 1 % Gel topical gel    VOLTAREN          fluticasone  50 MCG/ACT spray    FLONASE     Spray 1 spray in nostril        HUMIRA PEN 40 MG/0.8ML pen kit   Generic drug:  adalimumab           levothyroxine 112 MCG tablet    SYNTHROID/LEVOTHROID     Take 112 mcg by mouth        ondansetron 4 MG ODT tab    ZOFRAN-ODT     DIS 1 T ON THE TONGUE Q 8 H PRF NAUSEA OR VOM        polyethylene glycol powder    MIRALAX/GLYCOLAX     Take 17 g by mouth        PRENATAL VITAMINS PO           terbinafine 1 % cream    lamISIL

## 2018-11-02 NOTE — PATIENT INSTRUCTIONS
Treatment of Nausea and Vomiting in Pregnancy    Stop prenatal vitamin and start a folic acid supplement only until nausea improves (folic acid 400 micrograms by mouth daily)    Ginger capsules 250 mg by mouth daily     Consider acupressure with wrist bands    Vitamin B6 (pyridoxine) 10-25 mg by mouth 3-4 times per day.  This may be taken in combination with doxylamine.  Doxylamine 25 mg by mouth every night before bed.  You may also take doxylamine 12.5 mg (half tab) in the am and in the afternoon as needed.      It is important to stay hydrated.  Please let us know if your symptoms worsen or do not improve with the treatment plan listed above.      You may also take stool softeners (colace, or like medication).

## 2018-11-03 ASSESSMENT — ANXIETY QUESTIONNAIRES: GAD7 TOTAL SCORE: 7

## 2018-11-13 ENCOUNTER — HEALTH MAINTENANCE LETTER (OUTPATIENT)
Age: 28
End: 2018-11-13

## 2018-11-30 ENCOUNTER — PRENATAL OFFICE VISIT (OUTPATIENT)
Dept: OBGYN | Facility: CLINIC | Age: 28
End: 2018-11-30
Payer: COMMERCIAL

## 2018-11-30 VITALS
HEART RATE: 92 BPM | BODY MASS INDEX: 32.85 KG/M2 | DIASTOLIC BLOOD PRESSURE: 68 MMHG | SYSTOLIC BLOOD PRESSURE: 103 MMHG | WEIGHT: 215.5 LBS

## 2018-11-30 DIAGNOSIS — M45.0 ANKYLOSING SPONDYLITIS OF MULTIPLE SITES IN SPINE (H): ICD-10-CM

## 2018-11-30 DIAGNOSIS — Z23 NEED FOR PROPHYLACTIC VACCINATION AND INOCULATION AGAINST INFLUENZA: ICD-10-CM

## 2018-11-30 DIAGNOSIS — Z53.9 DIAGNOSIS NOT YET DEFINED: Primary | ICD-10-CM

## 2018-11-30 DIAGNOSIS — E03.9 HYPOTHYROID IN PREGNANCY, ANTEPARTUM: Primary | ICD-10-CM

## 2018-11-30 DIAGNOSIS — O99.280 HYPOTHYROID IN PREGNANCY, ANTEPARTUM: Primary | ICD-10-CM

## 2018-11-30 PROCEDURE — 90686 IIV4 VACC NO PRSV 0.5 ML IM: CPT | Performed by: OBSTETRICS & GYNECOLOGY

## 2018-11-30 PROCEDURE — 99207 ZZC PRENATAL VISIT: CPT | Performed by: OBSTETRICS & GYNECOLOGY

## 2018-11-30 PROCEDURE — 90471 IMMUNIZATION ADMIN: CPT | Performed by: OBSTETRICS & GYNECOLOGY

## 2018-11-30 NOTE — MR AVS SNAPSHOT
After Visit Summary   11/30/2018    Wilma Palacios    MRN: [id number]           Patient Information     Date Of Birth          1990        Visit Information        Provider Department      11/30/2018 9:45 AM Rae Chacko MD [facility] [place]        Today's Diagnoses     Hypothyroid in pregnancy, antepartum    -  1    Ankylosing spondylitis of multiple sites in spine (H)           Follow-ups after your visit        Follow-up notes from your care team     Return in about 4 weeks (around 12/28/2018) for OB Visit.      Who to contact     If you have questions or need follow up information about today's clinic visit or your schedule please contact [facility] directly at [phone].  Normal or non-critical lab and imaging results will be communicated to you by MyChart, letter or phone within 4 business days after the clinic has received the results. If you do not hear from us within 7 days, please contact the clinic through [employer] or phone. If you have a critical or abnormal lab result, we will notify you by phone as soon as possible.  Submit refill requests through [employer] or call your pharmacy and they will forward the refill request to us. Please allow 3 business days for your refill to be completed.          Additional Information About Your Visit        MyChart Information     [employer] gives you secure access to your electronic health record. If you see a primary care provider, you can also send messages to your care team and make appointments. If you have questions, please call your primary care clinic.  If you do not have a primary care provider, please call [phone] and they will assist you.        Care EveryWhere ID     This is your Care EveryWhere ID. This could be used by other organizations to access your [place] medical records  [id number]        Your Vitals Were     Pulse Last Period BMI (Body Mass Index)             92 08/15/2018 32.85 kg/m2           Blood Pressure from Last 3 Encounters:   11/30/18 103/68   11/02/18 111/78   10/04/18 108/62    Weight from Last 3 Encounters:   11/30/18 215 lb 8 oz (97.8 kg)   11/02/18 216 lb 14.4 oz (98.4 kg)   10/04/18 211 lb 4.8 oz (95.8 kg)              Today, you had the following     No orders found for display         Today's Medication Changes          These changes are accurate as of 11/30/18 10:10 AM.  If you have any questions, ask your nurse or doctor.               Stop taking these medicines if you haven't already. Please contact your care team if you have questions.     diclofenac 1 % topical gel   Commonly known as:  VOLTAREN   Stopped by:  Rae Chacko MD           ondansetron 4 MG ODT tab   Commonly known as:  ZOFRAN-ODT   Stopped by:  Rae Chacko MD           polyethylene glycol powder   Commonly known as:  MIRALAX/GLYCOLAX   Stopped by:  Rae Chacko MD                    Primary Care Provider Fax #    Physician No Ref-Primary [phone]       No address on file        Equal Access to Services     [facility]: Hadii familia ku hadasho Soomaali, waaxda luqadaha, qaybta kaalmada adebibiana, nany davila . So [facility] [phone].    ATENCIÓN: Si habla español, tiene a sahni disposición servicios gratuitos de asistencia lingüística. Dalia al [phone].    We comply with applicable federal civil rights laws and [place] laws. We do not discriminate on the basis of race, color, national origin, age, disability, sex, sexual orientation, or gender identity.            Thank you!     Thank you for choosing [facility]  for your care. Our goal is always to provide you with excellent care. Hearing back from our patients is one way we can continue to improve our services. Please take a few minutes to complete the written survey that you may receive in the mail after your visit with us. Thank you!             Your Updated Medication List - Protect others  around you: Learn how to safely use, store and throw away your medicines at www.disposemymeds.org.          This list is accurate as of 11/30/18 10:10 AM.  Always use your most recent med list.                   Brand Name Dispense Instructions for use Diagnosis    albuterol 108 (90 Base) MCG/ACT inhaler    PROAIR HFA/PROVENTIL HFA/VENTOLIN HFA     Inhale 2 puffs into the lungs        fluticasone 50 MCG/ACT nasal spray    FLONASE     Spray 1 spray in nostril        HUMIRA PEN 40 MG/0.8ML pen kit   Generic drug:  adalimumab           levothyroxine 112 MCG tablet    SYNTHROID/LEVOTHROID     Take 112 mcg by mouth        PRENATAL VITAMINS PO           terbinafine 1 % external cream    lamISIL

## 2018-11-30 NOTE — NURSING NOTE
"Chief Complaint   Patient presents with     Prenatal Care       Initial /68 (BP Location: Right arm, Patient Position: Chair, Cuff Size: Adult Regular)  Pulse 92  Wt 215 lb 8 oz (97.8 kg)  LMP 08/15/2018  BMI 32.85 kg/m2 Estimated body mass index is 32.85 kg/(m^2) as calculated from the following:    Height as of 10/4/18: 5' 7.91\" (1.725 m).    Weight as of this encounter: 215 lb 8 oz (97.8 kg).  BP completed using cuff size: large        Maria C Hurtado, ALEX  2018        "

## 2018-11-30 NOTE — PROGRESS NOTES
Presents for routine  appointment.     No complaints.  Constipation resolved.    No LOF/VB/Ctxs.    ROS:   and GI  negative.     Please see Prenatal Vitals and Notes Flowsheet for objective data.    A/P:  28 year old  at 15w2d       ICD-10-CM    1. Hypothyroid in pregnancy, antepartum O99.280     E03.9    2. Ankylosing spondylitis of multiple sites in spine (H) M45.0        Genetic screening - declined.    Hypothyroid - no recent dose changed.  Plan to check with GCT, sooner as needed.    Ankylosing spondylitis - Refer to [facility] for level 2 US and consultation regarding Humira in pregnancy.   Rheumatology- Kassy Duffy at [place]   Follow up in 4  week(s).      Rae Chacko MD

## 2018-11-30 NOTE — PROGRESS NOTES
Component      Latest Ref Rng & Units 11/28/2017 9/27/2018 10/1/2018   WBC      10:9/L   11.7   RBC Count      10:12/L   4.31   Hemoglobin      11.7 - 15.7 g/dL   13.4   Hematocrit      %   38.5   MCV      fl   89   MCH      pg   31.1   MCHC      g/dL   34.8   RDW      %   11.2   Platelet Count      10:9/L   245   RH Type        A Positive    Antibody Screen        negative    Rubella Antibody IgG Quantitative      IU/mL  2.44    Rubella Immunity        immune    Hep B Surface Agn        nonreactive    HIV 1/2 Agn Janet 4th Gen With Reflex        non reactive    Treponema Antibodies        negative    TSH      mcU/mL  1.75    PAP       NIL

## 2018-11-30 NOTE — PROGRESS NOTES
Injectable Influenza Immunization Documentation    1.  Is the person to be vaccinated sick today?   No    2. Does the person to be vaccinated have an allergy to a component   of the vaccine?   No  Egg Allergy Algorithm Link    3. Has the person to be vaccinated ever had a serious reaction   to influenza vaccine in the past?   No    4. Has the person to be vaccinated ever had Guillain-Barré syndrome?   No    Form completed by Maria C Hurtado CMA    Prior to injection verified patient identity using patient's name and date of birth.  Due to injection administration, patient instructed to remain in clinic for 15 minutes  afterwards, and to report any adverse reaction to me immediately.

## 2018-12-03 ENCOUNTER — TELEPHONE (OUTPATIENT)
Dept: OBGYN | Facility: CLINIC | Age: 28
End: 2018-12-03

## 2018-12-03 NOTE — TELEPHONE ENCOUNTER
Short term disability paperwork received & completed.  Forms will be faxed once signed by provider.

## 2018-12-28 ENCOUNTER — PRENATAL OFFICE VISIT (OUTPATIENT)
Dept: OBGYN | Facility: CLINIC | Age: 28
End: 2018-12-28
Payer: COMMERCIAL

## 2018-12-28 VITALS
DIASTOLIC BLOOD PRESSURE: 61 MMHG | SYSTOLIC BLOOD PRESSURE: 117 MMHG | BODY MASS INDEX: 33.37 KG/M2 | WEIGHT: 218.9 LBS | HEART RATE: 90 BPM

## 2018-12-28 DIAGNOSIS — O09.92 HIGH-RISK PREGNANCY IN SECOND TRIMESTER: Primary | ICD-10-CM

## 2018-12-28 PROCEDURE — 99207 ZZC PRENATAL VISIT: CPT | Performed by: OBSTETRICS & GYNECOLOGY

## 2018-12-28 NOTE — PROGRESS NOTES
Presents for routine  appointment.     No complaints.  She is not tolerating fruit - giving her diarrhea.    No LOF/VB/Ctxs.    ROS:   and GI  negative.     Please see Prenatal Vitals and Notes Flowsheet for objective data.    A/P:  28 year old  at 19w2d       ICD-10-CM    1. High-risk pregnancy in second trimester O09.92 Glucose tolerance, gest screen, 1 hour     OB hemoglobin     Treponema Abs w Reflex to RPR and Titer       Hypothyroid - no recent dose changed.  Plan to check with GCT, sooner as needed.    Ankylosing spondylitis - apt with MFM 19.    Rheumatology- Kassy Duffy at [place] apt on the .  She will ask her physician if there is any contraindication to vaginal delivery (she is worried about her pelvis)  GCT, hgb and anti-treponema testing  after 24 weeks   Follow up in 5 weeks.      Rae Chacko MD

## 2018-12-28 NOTE — NURSING NOTE
"Chief Complaint   Patient presents with     Prenatal Care       Initial /61 (BP Location: Right arm, Patient Position: Chair, Cuff Size: Adult Regular)   Pulse 90   Wt 99.3 kg (218 lb 14.4 oz)   LMP 08/15/2018   BMI 33.37 kg/m   Estimated body mass index is 33.37 kg/m  as calculated from the following:    Height as of 10/4/18: 1.725 m (5' 7.91\").    Weight as of this encounter: 99.3 kg (218 lb 14.4 oz).  BP completed using cuff size: large        Maria C Hurtado, ALEX  2018      "

## 2019-01-02 ENCOUNTER — NURSE TRIAGE (OUTPATIENT)
Dept: NURSING | Facility: CLINIC | Age: 29
End: 2019-01-02

## 2019-01-02 ENCOUNTER — TRANSFERRED RECORDS (OUTPATIENT)
Dept: HEALTH INFORMATION MANAGEMENT | Facility: CLINIC | Age: 29
End: 2019-01-02

## 2019-01-03 NOTE — TELEPHONE ENCOUNTER
" \"I am 20 weeks pregnant. I had an appt recently and an US this am everything was good. But tonight around 5 I got diarrhea. My belly would cramp then once I went I felt better. I've got twice tonight. I took a hot shower and felt a little dizzy, but I didn't drink much water today. But I rested and I feel fine now.\" denies other sx. Baby is active. Triaged and advised home care. Call back if needed.     Additional Information    Negative: Shock suspected (e.g., cold/pale/clammy skin, too weak to stand, low BP, rapid pulse)    Negative: Difficult to awaken or acting confused  (e.g., disoriented, slurred speech)    Negative: Sounds like a life-threatening emergency to the triager    Negative: Vomiting also present and worse than the diarrhea    Negative: [1] Blood in stool AND [2] without diarrhea    Negative: [1] SEVERE abdominal pain (e.g., excruciating) AND [2] present > 1 hour    Negative: [1] SEVERE abdominal pain AND [2] age > 60    Negative: [1] Blood in the stool AND [2] moderate or large amount of blood    Negative: Black or tarry bowel movements  (Exception: chronic-unchanged  black-grey bowel movements AND is taking iron pills or Pepto-bismol)    Negative: [1] Drinking very little AND [2] dehydration suspected (e.g., no urine > 12 hours, very dry mouth, very lightheaded)    Negative: Patient sounds very sick or weak to the triager    Negative: [1] SEVERE diarrhea (e.g., 7 or more times / day more than normal) AND [2]  age > 60 years    Negative: [1] Constant abdominal pain AND [2] present > 2 hours    Negative: [1] Fever > 103 F (39.4 C) AND [2] not able to get the fever down using Fever Care Advice    Negative: [1] SEVERE diarrhea (e.g., 7 or more times / day more than normal) AND [2] present > 24 hours (1 day)    Negative: [1] MODERATE diarrhea (e.g., 4-6 times / day more than normal) AND [2] present > 48 hours (2 days)    Negative: [1] MODERATE diarrhea (e.g., 4-6 times / day more than normal) AND [2] " age > 70 years    Negative: Fever > 101 F (38.3 C)    Negative: Fever present > 3 days (72 hours)    Negative: Abdominal pain  (Exception: Pain clears with each passage of diarrhea stool)    Negative: [1] Blood in the stool AND [2] small amount of blood   (Exception: only on toilet paper. Reason: diarrhea can cause rectal irritation with blood on wiping)    Negative: [1] Mucus or pus in stool AND [2] present > 2 days AND [3] diarrhea is more than mild    Negative: [1] Recent antibiotic therapy (i.e., within last 2 months) AND [2] > 3 days since antibiotic was stopped    Negative: Weak immune system (e.g., HIV positive, cancer chemo, splenectomy, organ transplant, chronic steroids)    Negative: Tube feedings (e.g., nasogastric, g-tube, j-tube)    Negative: Travel to a foreign country in past month    Negative: [1] MILD (e.g., 1-3 or more stools than normal in past 24 hours) diarrhea without known cause AND [2] present >  7 days    Negative: Diarrhea is a chronic symptom (recurrent or ongoing AND present > 4 weeks)    Negative: SEVERE diarrhea (e.g., 7 or more times / day more than normal) (all triage questions negative)    MILD-MODERATE diarrhea (e.g., 1-6 times / day more than normal) (all triage questions negative)    Protocols used: DIARRHEA-ADULT-

## 2019-01-07 DIAGNOSIS — M45.0 ANKYLOSING SPONDYLITIS OF MULTIPLE SITES IN SPINE (H): Primary | ICD-10-CM

## 2019-01-07 PROBLEM — O09.90 HIGH-RISK PREGNANCY: Status: ACTIVE | Noted: 2018-11-02

## 2019-01-08 ENCOUNTER — NURSE TRIAGE (OUTPATIENT)
Dept: NURSING | Facility: CLINIC | Age: 29
End: 2019-01-08

## 2019-01-09 NOTE — TELEPHONE ENCOUNTER
"Patient is \"21 weeks tomorrow\" pregnant.  Patient states she had explosive sudden onset diarrhea x 1 following a meal 1 hour ago.  Describes very soft large amount of stool.  No blood in stool.  Normal bowel movement today.  Reports a history of gastro-intestinal issues.   States this is the 3rd episode in past few weeks.   States had mid upper abdominal pain prior to diarrhea.    Currently states has mild low abdominal discomfort that is resolving.   Afebrile. No vomiting.  EDC 5-22-19. Sees Dr. Chacko at [facility] [place].    Denies vaginal bleeding. Denies vaginal leakage. Denies contractions or cramping.  States baby is moving as usual.     Protocol- Pregnancy- Abdominal Pain- Greater than 20 Weeks- Adult   Care advice reviewed in detail and when to call back.   Disposition-  See PCP within 2 Weeks  Caller states understanding of the recommended disposition.   Advised to be seen at [facility] [place].  Patient transferred to scheduling. Plans to be seen within 1-2 days.   Advised to call back if further questions or concerns.     CONSTANCE Crow RN  [facility]         Reason for Disposition    [1] Upper abdominal pains AND [2] occur regularly about 1 hour after meals    Additional Information    Negative: Passed out (i.e., lost consciousness, collapsed and was not responding)    Negative: Shock suspected (e.g., cold/pale/clammy skin, too weak to stand, low BP, rapid pulse)    Negative: Difficult to awaken or acting confused  (e.g., disoriented, slurred speech)    Negative: [1] SEVERE abdominal pain (e.g., excruciating) AND [2] constant AND [3] present > 1 hour    Negative: SEVERE vaginal bleeding (e.g., continuous red blood from vagina, or large blood clots)    Negative: Sounds like a life-threatening emergency to the triager    Negative: [1] Vomiting AND [2] contains red blood or black (\"coffee ground\") material  (Exception: few red streaks in vomit that only happened once)    Negative: " MODERATE-SEVERE abdominal pain (e.g., interferes with normal activities, awakens from sleep)    Negative: Vaginal bleeding or spotting    Negative: [1] Baby moving less today (e.g., kick count < 5 in 1 hour or < 10 in 2 hours) AND [2] pregnant 23 or more weeks    Negative: Leakage of fluid from vagina    Negative: New hand or face swelling    Negative: Blurred vision or visual changes    Negative: [1] SEVERE headache AND [2] not relieved with acetaminophen (e.g., Tylenol)    Negative: [1] MILD abdominal pain (e.g., doesn't interfere with normal activities) AND [2] constant AND [3] present > 2 hours    Negative: [1] Intermittent lower abdominal pain AND [2] present > 24 hours    Negative: Fever > 100.4 F (38.0 C)    Negative: Blood in urine (red, pink, or tea-colored)    Negative: White of the eyes have turned yellow (i.e., jaundice)    Negative: Patient sounds very sick or weak to the triager    Negative: Discomfort when passing urine (e.g., pain, burning or stinging)    Negative: Unusual vaginal discharge (e.g., bad smelling, yellow, green, or foamy-white)    Negative: [1] Upper abdominal pains AND [2] radiate into chest, with sour taste in mouth    Protocols used: PREGNANCY - ABDOMINAL PAIN GREATER THAN 20 WEEKS EGA-ADULT-

## 2019-01-15 ENCOUNTER — TRANSFERRED RECORDS (OUTPATIENT)
Dept: HEALTH INFORMATION MANAGEMENT | Facility: CLINIC | Age: 29
End: 2019-01-15

## 2019-01-15 LAB
AST SERPL-CCNC: 9 U/L (ref 8–43)
CREAT SERPL-MCNC: 0.84 MG/DL (ref 0.59–1.04)
GFR SERPL CREATININE-BSD FRML MDRD: >90 ML/MIN/1.73M2
HEP C HIM: NORMAL

## 2019-01-17 PROBLEM — M45.0 ANKYLOSING SPONDYLITIS OF MULTIPLE SITES IN SPINE (H): Status: ACTIVE | Noted: 2018-10-04

## 2019-01-24 ENCOUNTER — MYC MEDICAL ADVICE (OUTPATIENT)
Dept: OBGYN | Facility: CLINIC | Age: 29
End: 2019-01-24

## 2019-01-24 NOTE — TELEPHONE ENCOUNTER
Patient informed that she does not have to fast prior to her 1 hr gtt but patient should refrain from eating or drinking anything with a lot of sugar beforehand. Patient verbalized understanding.     Berkley Wong CMA  January 24, 2019  4:55 PM

## 2019-01-25 ENCOUNTER — PRENATAL OFFICE VISIT (OUTPATIENT)
Dept: OBGYN | Facility: CLINIC | Age: 29
End: 2019-01-25
Payer: COMMERCIAL

## 2019-01-25 VITALS
HEART RATE: 103 BPM | SYSTOLIC BLOOD PRESSURE: 121 MMHG | WEIGHT: 229.3 LBS | DIASTOLIC BLOOD PRESSURE: 81 MMHG | BODY MASS INDEX: 34.95 KG/M2

## 2019-01-25 DIAGNOSIS — N89.8 VAGINAL DISCHARGE: Primary | ICD-10-CM

## 2019-01-25 DIAGNOSIS — M45.0 ANKYLOSING SPONDYLITIS OF MULTIPLE SITES IN SPINE (H): ICD-10-CM

## 2019-01-25 DIAGNOSIS — O09.92 HIGH-RISK PREGNANCY IN SECOND TRIMESTER: ICD-10-CM

## 2019-01-25 LAB
SPECIMEN SOURCE: NORMAL
WET PREP SPEC: NORMAL

## 2019-01-25 PROCEDURE — 99207 ZZC COMPLICATED OB VISIT: CPT | Performed by: OBSTETRICS & GYNECOLOGY

## 2019-01-25 PROCEDURE — 87210 SMEAR WET MOUNT SALINE/INK: CPT | Performed by: OBSTETRICS & GYNECOLOGY

## 2019-01-25 NOTE — PATIENT INSTRUCTIONS
Plan glucose test at your next visit in 4 weeks (you should be about 27 weeks at that time)  Plan US at about 28 weeks (5 weeks from today).

## 2019-01-25 NOTE — NURSING NOTE
"Chief Complaint   Patient presents with     Prenatal Care       Initial /81 (BP Location: Right arm, Patient Position: Chair, Cuff Size: Adult Regular)   Pulse 103   Wt 104 kg (229 lb 4.8 oz)   LMP 08/15/2018   BMI 34.95 kg/m   Estimated body mass index is 33.37 kg/m  as calculated from the following:    Height as of 10/4/18: 1.725 m (5' 7.91\").    Weight as of 18: 99.3 kg (218 lb 14.4 oz).  BP completed using cuff size: regular        Maria C Hurtado, ALEX  2019        "

## 2019-01-25 NOTE — PROGRESS NOTES
Presents for routine  appointment.    Increased vaginal discharge, watery.  Still has a slight odor.  That is not new.  No itching or burning.    Continues to have bouts of diarrhea.  Usually after dinner. Painful, then has diarrhea, and pain goes away.     No LOF/VB/Ctxs.    ROS:   and GI otherwise negative.     Please see Prenatal Vitals and Notes Flowsheet for objective data.    Vaginal exam - moderate amount of white discharge.  Mild erythema at the vestibule.  Normal cervix, appears closed.  No pooling.  Wet prep collected    A/P:  28 year old  at 23w2d       ICD-10-CM    1. Vaginal discharge N89.8 Wet prep   2. High-risk pregnancy in second trimester O09.92 CBC with platelets     Glucose tolerance gest screen 1 hour     Treponema Abs w Reflex to RPR and Titer     TSH with free T4 reflex     US OB >14 Weeks Follow Up     CANCELED: Glucose tolerance, gest screen, 1 hour     CANCELED: OB hemoglobin     CANCELED: Treponema Abs w Reflex to RPR and Titer     CANCELED: TSH with free T4 reflex   3. Ankylosing spondylitis of multiple sites in spine (H) M45.0 CANCELED: SSA Ro LESLY Antibody IgG     CANCELED: SSB La LESLY Antibody IgG       Discussed diarrhea - consider GI referral.  She may see the provider she has see in the past.    SSA SSB neg (done at [place]).  GCT, CBC and anti-treponema testing  after 24 weeks.  TSH at that time as well.  Reordered   Stop Humira at 28 wks.  Repeat US at 28 for growth   Follow up in 4  weeks.      Rae Chacko MD

## 2019-02-20 ENCOUNTER — NURSE TRIAGE (OUTPATIENT)
Dept: NURSING | Facility: CLINIC | Age: 29
End: 2019-02-20

## 2019-02-20 NOTE — TELEPHONE ENCOUNTER
FNA triage call :   Presenting problem : Pt called., 27 weeks pregnant , BAUTISTA  21 and Followed by Dr Chacko -  [facility] clinic  . Scant amount of mucous about 2  diameter - yellow discharge vaginal  , with 2 gtts  of brown blood in underwear  noted within the hour. Currently : no fever or pain , having  mild irregular cramps for last few weeks, and   no change in fetal kicking  or no recent sex .   Guideline used : pregnancy - vaginal bleeding greater than 20 weeks .    Disposition and recommendations :  Call OB MD / Dr Myrna Ham @ 503pm to call Pt at [phone]  For 2nd level triage .  FNA advised to call back if no answer to page in 20 minutes or if any further problems or symptoms.     Caller verbalizes understanding and denies further questions and will call back if further symptoms to triage or questions  . Kendra Hamilton RN  - [place] [facility]   [phone]      Reason for Disposition    [1] Pregnant 24-36 weeks () AND [2] pinkish or brownish mucous discharge    Additional Information    Negative: Passed out (i.e., lost consciousness, collapsed and was not responding)    Negative: Shock suspected (e.g., cold/pale/clammy skin, too weak to stand, low BP, rapid pulse)    Negative: Difficult to awaken or acting confused  (e.g., disoriented, slurred speech)    Negative: SEVERE vaginal bleeding (e.g., continuous red blood from vagina, large blood clots)    Negative: [1] SEVERE abdominal pain (e.g., excruciating) AND [2] constant AND [3] present > 1 hour    Negative: Sounds like a life-threatening emergency to the triager    Negative: [1] Vaginal bleeding AND [2] pregnant < 20 weeks    Negative: MILD-MODERATE vaginal bleeding (i.e., small to medium clots; like mild menstrual period)    Negative: Abdominal pain or having contractions    Negative: Leakage of fluid from vagina (or caller thinks she has ruptured her bag of liu)    Negative: Baby moving less today (e.g., kick count < 5 in 1 hour  or < 10 in 2 hours)    Protocols used: PREGNANCY - VAGINAL BLEEDING GREATER THAN 20 WEEKS EGA-ADULT-AH

## 2019-02-22 ENCOUNTER — MYC MEDICAL ADVICE (OUTPATIENT)
Dept: OBGYN | Facility: CLINIC | Age: 29
End: 2019-02-22

## 2019-02-22 ENCOUNTER — PRENATAL OFFICE VISIT (OUTPATIENT)
Dept: OBGYN | Facility: CLINIC | Age: 29
End: 2019-02-22
Payer: COMMERCIAL

## 2019-02-22 VITALS
BODY MASS INDEX: 35.4 KG/M2 | DIASTOLIC BLOOD PRESSURE: 71 MMHG | WEIGHT: 232.2 LBS | HEART RATE: 98 BPM | SYSTOLIC BLOOD PRESSURE: 113 MMHG

## 2019-02-22 DIAGNOSIS — O99.280 HYPOTHYROID IN PREGNANCY, ANTEPARTUM: ICD-10-CM

## 2019-02-22 DIAGNOSIS — R31.21 ASYMPTOMATIC MICROSCOPIC HEMATURIA: ICD-10-CM

## 2019-02-22 DIAGNOSIS — M45.0 ANKYLOSING SPONDYLITIS OF MULTIPLE SITES IN SPINE (H): Primary | ICD-10-CM

## 2019-02-22 DIAGNOSIS — E03.9 HYPOTHYROID IN PREGNANCY, ANTEPARTUM: ICD-10-CM

## 2019-02-22 DIAGNOSIS — O09.92 HIGH-RISK PREGNANCY IN SECOND TRIMESTER: ICD-10-CM

## 2019-02-22 DIAGNOSIS — R73.09 ABNORMAL GLUCOSE TOLERANCE TEST: Primary | ICD-10-CM

## 2019-02-22 LAB
ALBUMIN UR-MCNC: NEGATIVE MG/DL
APPEARANCE UR: CLEAR
BACTERIA #/AREA URNS HPF: ABNORMAL /HPF
BILIRUB UR QL STRIP: NEGATIVE
COLOR UR AUTO: YELLOW
ERYTHROCYTE [DISTWIDTH] IN BLOOD BY AUTOMATED COUNT: 13.4 % (ref 10–15)
GLUCOSE 1H P 50 G GLC PO SERPL-MCNC: 161 MG/DL (ref 60–129)
GLUCOSE UR STRIP-MCNC: 300 MG/DL
HCT VFR BLD AUTO: 35.2 % (ref 35–47)
HGB BLD-MCNC: 11.8 G/DL (ref 11.7–15.7)
HGB UR QL STRIP: ABNORMAL
KETONES UR STRIP-MCNC: NEGATIVE MG/DL
LEUKOCYTE ESTERASE UR QL STRIP: NEGATIVE
MCH RBC QN AUTO: 32.2 PG (ref 26.5–33)
MCHC RBC AUTO-ENTMCNC: 33.5 G/DL (ref 31.5–36.5)
MCV RBC AUTO: 96 FL (ref 78–100)
NITRATE UR QL: NEGATIVE
NON-SQ EPI CELLS #/AREA URNS LPF: ABNORMAL /LPF
PH UR STRIP: 7 PH (ref 5–7)
PLATELET # BLD AUTO: 258 10E9/L (ref 150–450)
RBC # BLD AUTO: 3.67 10E12/L (ref 3.8–5.2)
RBC #/AREA URNS AUTO: ABNORMAL /HPF
SOURCE: ABNORMAL
SP GR UR STRIP: 1.01 (ref 1–1.03)
TSH SERPL DL<=0.005 MIU/L-ACNC: 1.27 MU/L (ref 0.4–4)
UROBILINOGEN UR STRIP-MCNC: NORMAL MG/DL (ref 0–2)
WBC # BLD AUTO: 10.7 10E9/L (ref 4–11)
WBC #/AREA URNS AUTO: ABNORMAL /HPF

## 2019-02-22 PROCEDURE — 82950 GLUCOSE TEST: CPT | Performed by: OBSTETRICS & GYNECOLOGY

## 2019-02-22 PROCEDURE — 99207 ZZC COMPLICATED OB VISIT: CPT | Performed by: OBSTETRICS & GYNECOLOGY

## 2019-02-22 PROCEDURE — 36415 COLL VENOUS BLD VENIPUNCTURE: CPT | Performed by: OBSTETRICS & GYNECOLOGY

## 2019-02-22 PROCEDURE — 84443 ASSAY THYROID STIM HORMONE: CPT | Performed by: OBSTETRICS & GYNECOLOGY

## 2019-02-22 PROCEDURE — 0064U ANTB TP TOTAL&RPR IA QUAL: CPT | Performed by: OBSTETRICS & GYNECOLOGY

## 2019-02-22 PROCEDURE — 81001 URINALYSIS AUTO W/SCOPE: CPT | Performed by: OBSTETRICS & GYNECOLOGY

## 2019-02-22 PROCEDURE — 87086 URINE CULTURE/COLONY COUNT: CPT | Performed by: OBSTETRICS & GYNECOLOGY

## 2019-02-22 PROCEDURE — 85027 COMPLETE CBC AUTOMATED: CPT | Performed by: OBSTETRICS & GYNECOLOGY

## 2019-02-22 ASSESSMENT — PATIENT HEALTH QUESTIONNAIRE - PHQ9: SUM OF ALL RESPONSES TO PHQ QUESTIONS 1-9: 3

## 2019-02-22 NOTE — TELEPHONE ENCOUNTER
[employer] message routed to provider for review and response. Please advise.      Berkley Wong CMA  10:30 AM  February 22, 2019

## 2019-02-22 NOTE — TELEPHONE ENCOUNTER
[employer] message routed to provider for review and response. Please advise.      Berkley Wong CMA  1:18 PM  February 22, 2019

## 2019-02-22 NOTE — PROGRESS NOTES
Presents for routine  appointment.     No complaints.    No LOF/VB/Ctxs.    ROS:   and GI  negative.     Please see Prenatal Vitals and Notes Flowsheet for objective data.    A/P:  28 year old  at 27w2d       ICD-10-CM    1. Ankylosing spondylitis of multiple sites in spine (H) M45.0    2. High-risk pregnancy in second trimester O09.92 Treponema Abs w Reflex to RPR and Titer     Glucose tolerance gest screen 1 hour     CBC with platelets   3. Hypothyroid in pregnancy, antepartum O99.280 TSH with free T4 reflex    E03.9    4. Asymptomatic microscopic hematuria R31.21 UA with Microscopic     Urine Culture Aerobic Bacterial       1 hr glucola today.  She does have access to [employer].  She is Rh Positive   Ankylosing spondilytis - SSA SSB neg (done at [place]). Stop Humira at 28 wks. Repeat Growth US at 28 wks (scheduled for next Friday). Referral to Anesthesia to make sure they don't anticipate problems.  Maya Mayo RN will help facilitate.    Discussed history of hematuria.  Plan repeat UA today. She may stop abx if no signs of infection.  Will plan to repeat UA postpartum (after lochia and no menstrual bleeding).  If she continue to have microscopic hematuria, will plan to refer to Urology.    TDAP  next visit.  Pneumonia vaccine next visit  Discussed signs and symptoms of  labor  Follow up in 4  weeks.      Rae Chacko MD

## 2019-02-22 NOTE — NURSING NOTE
"Chief Complaint   Patient presents with     Prenatal Care       Initial /71 (BP Location: Right arm, Patient Position: Chair, Cuff Size: Adult Regular)   Pulse 98   Wt 105.3 kg (232 lb 3.2 oz)   LMP 08/15/2018   BMI 35.40 kg/m   Estimated body mass index is 35.4 kg/m  as calculated from the following:    Height as of 10/4/18: 1.725 m (5' 7.91\").    Weight as of this encounter: 105.3 kg (232 lb 3.2 oz).  BP completed using cuff size: regular          Maria C Hrutado, ALEX  2019      "

## 2019-02-23 LAB
BACTERIA SPEC CULT: NORMAL
SPECIMEN SOURCE: NORMAL
T PALLIDUM AB SER QL: NONREACTIVE

## 2019-02-27 ENCOUNTER — ANCILLARY PROCEDURE (OUTPATIENT)
Dept: ULTRASOUND IMAGING | Facility: CLINIC | Age: 29
End: 2019-02-27
Attending: OBSTETRICS & GYNECOLOGY
Payer: COMMERCIAL

## 2019-02-27 DIAGNOSIS — O09.92 HIGH-RISK PREGNANCY IN SECOND TRIMESTER: ICD-10-CM

## 2019-02-27 DIAGNOSIS — R73.09 ABNORMAL GLUCOSE TOLERANCE TEST: ICD-10-CM

## 2019-02-27 DIAGNOSIS — O09.93 HIGH-RISK PREGNANCY IN THIRD TRIMESTER: Primary | ICD-10-CM

## 2019-02-27 LAB
GLUCOSE 1H P 100 G GLC PO SERPL-MCNC: 154 MG/DL (ref 60–179)
GLUCOSE 2H P 100 G GLC PO SERPL-MCNC: 150 MG/DL (ref 60–154)
GLUCOSE 3H P 100 G GLC PO SERPL-MCNC: 128 MG/DL (ref 60–139)
GLUCOSE BLDC GLUCOMTR-MCNC: 73 MG/DL (ref 70–99)
GLUCOSE P FAST SERPL-MCNC: 86 MG/DL (ref 60–94)
RADIOLOGIST FLAGS: NORMAL

## 2019-02-27 PROCEDURE — 82951 GLUCOSE TOLERANCE TEST (GTT): CPT | Performed by: OBSTETRICS & GYNECOLOGY

## 2019-02-27 PROCEDURE — 76816 OB US FOLLOW-UP PER FETUS: CPT | Performed by: STUDENT IN AN ORGANIZED HEALTH CARE EDUCATION/TRAINING PROGRAM

## 2019-02-27 PROCEDURE — 36415 COLL VENOUS BLD VENIPUNCTURE: CPT | Performed by: OBSTETRICS & GYNECOLOGY

## 2019-02-27 PROCEDURE — 82952 GTT-ADDED SAMPLES: CPT | Performed by: OBSTETRICS & GYNECOLOGY

## 2019-02-27 NOTE — RESULT ENCOUNTER NOTE
Called patient to discuss results.  Plan repeat US to check ERICA.  Note that the 3 hr GTT was normal.

## 2019-03-05 ENCOUNTER — NURSE TRIAGE (OUTPATIENT)
Dept: NURSING | Facility: CLINIC | Age: 29
End: 2019-03-05

## 2019-03-05 ENCOUNTER — TELEPHONE (OUTPATIENT)
Dept: OBGYN | Facility: OTHER | Age: 29
End: 2019-03-05

## 2019-03-05 NOTE — TELEPHONE ENCOUNTER
Pt states she started getting nauseous after breakfast this morning.  She didn't eat anything different, she had a cinnamon roll.  Denies vomiting, fever, contractions, headache.  Pt does not think she has been around anyone that has been sick.  Pt states she did have nausea in her first trimester but that went away.  I advised pt to rest, drink small sips of water frequently and if she is hungry eat bland food.  If she starts to vomit or starts to have contractions she needs to call us right away.    Pt does have emetrol liquid for nausea.  She is wondering if she can take this for her nausea.  Will route to Dr. Chacko for further recommendations.    Flor Churchill, RN

## 2019-03-05 NOTE — TELEPHONE ENCOUNTER
Pt called back because she has now vomited.  She states she feels a little better but still a little nauseous.  Still denies any contractions.  Pt doesn't want to be evaluated at L&D.  Temperature was 96.7 degrees farhenheit.  Advised pt to rest, drink small sips of water.  Pt is going to take a Zofran.  Will route to Dr. Chacko for further recommendations.    Flor Churchill RN

## 2019-03-05 NOTE — TELEPHONE ENCOUNTER
"Reason for call:  Patient reporting a symptom    Symptom or request: Pt called into [facility] states she had been transferred all around \"I am not feeling well and I just want to speak to a nurse\"  Pt states she is very nauseous feels like throwing up.  29 weeks pregnant    Duration (how long have symptoms been present): couple hours.    Have you been treated for this before? No    Additional comments:     Phone Number patient can be reached at:  Home number on file [phone] (home)    Best Time:  any    Can we leave a detailed message on this number:  YES    Call taken on 3/5/2019 at 11:23 AM by Vaishnavi Alcala    "

## 2019-03-06 NOTE — TELEPHONE ENCOUNTER
Spoke with pt.  She did end up going to the ED yesterday as she was unable to keep anything down.  They gave her IV fluids.    Pt states she is able to keep fluids down today but still not feeling well.  Denies contractions or any other symptoms.  Advised to continue to rest, continue to drink fluids and monitor symptoms.  Pt verbalized understanding and agreed to plan.    Flor Churchill RN

## 2019-03-06 NOTE — TELEPHONE ENCOUNTER
"Seen at hospital today, given IV fluids. Patient calling because she still feels nauseated, however has not vomited since 12:30p today,Urinated a few minutes ago. Is sipping fluids, but says she has \"not appetite for food\". Advisde to continue fluids and not worry about eating tonight. Reviewed signs and symptoms of dehydration to be concerned about and also advised to call FNA back with questions or concerns.  Ivy Cramer RN  [place] [facility]    Additional Information    Negative: Shock suspected (e.g., cold/pale/clammy skin, too weak to stand, low BP, rapid pulse)    Negative: Sounds like a life-threatening emergency to the triager    Negative: [1] Nausea or vomiting AND [2] pregnancy < 20 weeks    Negative: Menstrual Period - Missed or Late (i.e., pregnancy suspected)    Negative: Heat exhaustion suspected (i.e., dehydration from heat exposure)    Negative: Motion sickness suspected (i.e., nausea with car, plane, boat, or train travel)    Negative: Anxiety or stress suspected (i.e., nausea with anxiety attacks or stressful situations)    Negative: Traumatic Brain Injury (TBI) suspected    Negative: Vomiting occurs    Negative: Other symptom is present, see that guideline.  (e.g., chest pain, headache, dizziness, abdominal pain, colds, sore throat, etc.).    Negative: Unable to walk, or can only walk with assistance (e.g., requires support)    Negative: Difficulty breathing    Negative: [1] Insulin-dependent diabetes (Type I) AND [2] glucose > 400 mg/dl (22 mmol/l)    Negative: [1] Drinking very little AND [2] dehydration suspected (e.g., no urine > 12 hours, very dry mouth, very lightheaded)    Negative: Patient sounds very sick or weak to the triager    Negative: Fever > 104 F (40 C)    Negative: [1] Fever > 101 F (38.3 C) AND [2] age > 60    Negative: [1] Fever > 101 F (38.3 C) AND [2] bedridden (e.g., nursing home patient, CVA, chronic illness, recovering from surgery)    Negative: [1] Fever > 100.5 " "F (38.1 C) AND [2] diabetes mellitus or weak immune system (e.g., HIV positive, cancer chemo, splenectomy, organ transplant, chronic steroids)    Negative: Taking any of the following medications: digoxin (Lanoxin), lithium, theophylline, phenytoin (Dilantin)    Negative: Yellowish color of the skin or white of the eye (i.e., jaundice)    Negative: Fever present > 3 days (72 hours)    Negative: Receiving cancer chemotherapy medication    Negative: Taking prescription medication that could cause nausea (e.g., narcotics/opiates, antibiotics, OCPs, many others)    Negative: Nausea persists > 1 week    Negative: Alcohol or drug abuse, known or suspected    Negative: Nausea is a chronic symptom (recurrent or ongoing AND present > 4 weeks)    Unexplained nausea  (all triage questions negative)    Answer Assessment - Initial Assessment Questions  1. NAUSEA SEVERITY: \"How bad is the nausea?\" (e.g., mild, moderate, severe; dehydration, weight loss)    - MILD: loss of appetite without change in eating habits    - MODERATE: decreased oral intake without significant weight loss, dehydration, or malnutrition    - SEVERE: inadequate caloric or fluid intake, significant weight loss, symptoms of dehydration      Mild-moderate  2. ONSET: \"When did the nausea begin?\"      today  3. VOMITING: \"Any vomiting?\" If so, ask: \"How many times today?\"      Not since 12:30p  4. RECURRENT SYMPTOM: \"Have you had nausea before?\" If so, ask: \"When was the last time?\" \"What happened that time?\"      yes  5. CAUSE: \"What do you think is causing the nausea?\"      virus  6. PREGNANCY: \"Is there any chance you are pregnant?\" (e.g., unprotected intercourse, missed birth control pill, broken condom)      Yes 28w 6d    Protocols used: NAUSEA-ADULT-AH      "

## 2019-03-06 NOTE — TELEPHONE ENCOUNTER
Attempted to reach pt to see how she is feeling this morning and to let her know Dr. Chacko's recommendations below.  Left detailed message.    Flor Churchill RN

## 2019-03-06 NOTE — TELEPHONE ENCOUNTER
Agree with the patient's plan to rest and try zofran.  Zofran can be constipating, which can also contribute to nausea.

## 2019-03-07 NOTE — TELEPHONE ENCOUNTER
Left a detailed message for pt letting her know Dr. Chacko's recommendations below.    Flor Churchill RN

## 2019-03-07 NOTE — TELEPHONE ENCOUNTER
Pt called again and doesn't feel like she has a stomach bug.  She is still really nauseous and is wondering if she needs to be seen or if this is common in 3rd trimester of pregnancy.  Pt denies vomiting, diarrhea, fever, contractions.    Will route to Dr. Chacko for further recommendations.    Flor Churchill RN

## 2019-03-07 NOTE — TELEPHONE ENCOUNTER
Agree with rest and hydration.      Please let the patient know I reviewed the notes from the ED visit.      She should let us know if her symptoms worsen or do not improve.

## 2019-03-08 ENCOUNTER — NURSE TRIAGE (OUTPATIENT)
Dept: NURSING | Facility: CLINIC | Age: 29
End: 2019-03-08

## 2019-03-08 ENCOUNTER — MYC MEDICAL ADVICE (OUTPATIENT)
Dept: OBGYN | Facility: CLINIC | Age: 29
End: 2019-03-08

## 2019-03-09 NOTE — TELEPHONE ENCOUNTER
"29 wk, , Ate a few bites raw chicken on 3/4. Vomiting 3/5, seen at [facility] ED. Discharged home w/ Zofran. No vomiting since 3/5. Still c/o nausea. No nausea w/ pregnancy until now. 2 loose stools today, no bloody stools, no fever, no dizziness. Urine output 1 hr ago. Keeping fluids and small amounts of food down. Called OB Dr Chacko on 3/5, see TE. Dr Chacko reviewed ED note and agreed w/ plan. Advised home care. Call if worse/new sx. Brinda Meredith RN/FNA      Additional Information    Negative: Shock suspected (e.g., cold/pale/clammy skin, too weak to stand, low BP, rapid pulse)    Negative: Difficult to awaken or acting confused  (e.g., disoriented, slurred speech)    Negative: Sounds like a life-threatening emergency to the triager    Negative: Vomiting occurs only while coughing    Negative: [1] Pregnant < 20 Weeks AND [2] nausea/vomiting began in early pregnancy (i.e., 4-8 weeks pregnant)    Negative: Chest pain    Negative: [1] SEVERE headache AND [2] similar to prior migraines    Negative: [1] Vomiting AND [2] contains red blood or black (\"coffee ground\") material  (Exception: few red streaks in vomit that only happened once)    Negative: Severe pain in one eye    Negative: Recent head injury (within last 3 days)    Negative: Recent abdominal injury (within last 3 days)    Negative: [1] Insulin-dependent diabetes (Type I) AND [2] glucose > 400 mg/dl (22 mmol/l)    Negative: [1] SEVERE vomiting (e.g., 6 or more times/day) AND [2] present > 8 hours    Negative: [1] MODERATE vomiting (e.g., 3 - 5 times/day) AND [2] age > 60    Negative: Severe headache (e.g., excruciating) (Exception: similar to previous migraines)    Negative: High-risk adult (e.g., diabetes mellitus, brain tumor, V-P shunt, hernia)    Negative: [1] Drinking very little AND [2] dehydration suspected (e.g., no urine > 12 hours, very dry mouth, very lightheaded)    Negative: Patient sounds very sick or weak to the triager    " Negative: [1] MILD to MODERATE vomiting (e.g., 1-5 times/day) AND [2] abdomen looks much more swollen than usual    Negative: [1] Vomiting AND [2] contains bile (green color)    Negative: [1] Constant abdominal pain AND [2] present > 2 hours    Negative: [1] Fever > 103 F (39.4 C) AND [2] not able to get the fever down using Fever Care Advice    Negative: [1] Fever > 101 F (38.3 C) AND [2] age > 60    Negative: [1] Fever > 101 F (38.3 C) AND [2] bedridden (e.g., nursing home patient, CVA, chronic illness, recovering from surgery)    Negative: [1] Fever > 100.5 F (38.1 C) AND [2] weak immune system (e.g., HIV positive, cancer chemo, splenectomy, organ transplant, chronic steroids)    Negative: Taking any of the following medications: digoxin (Lanoxin), lithium, theophylline, phenytoin (Dilantin)    Negative: [1] MILD or MODERATE vomiting AND [2] present > 48 hours (2 days) (Exception: mild vomiting with associated diarrhea)    Negative: Fever present > 3 days (72 hours)    Negative: Vomiting a prescription medication    Negative: [1] MILD vomiting with diarrhea AND [2] present > 5 days    Negative: Alcohol or drug abuse, known or suspected    Negative: Vomiting is a chronic symptom (recurrent or ongoing AND present > 4 weeks)    Negative: [1] SEVERE vomiting (e.g., 6 or more times/day, vomits everything) BUT [2] hydrated (all triage questions negative)    Negative: MILD or MODERATE vomiting (e.g., 1 - 5 times / day) (all triage questions negative)    MILD vomiting with diarrhea (all triage questions negative)    Protocols used: VOMITING-ADULT-

## 2019-03-11 NOTE — TELEPHONE ENCOUNTER
Discussed patient's concerns.  She is feeling better.  She never had a fever.  Plan to continue observation for now.   Discussed breast pump.  She can get that at her next appointment or after delivery when she is on the pp floor.

## 2019-03-14 ENCOUNTER — ANCILLARY PROCEDURE (OUTPATIENT)
Dept: ULTRASOUND IMAGING | Facility: CLINIC | Age: 29
End: 2019-03-14
Attending: OBSTETRICS & GYNECOLOGY
Payer: COMMERCIAL

## 2019-03-14 ENCOUNTER — PRENATAL OFFICE VISIT (OUTPATIENT)
Dept: OBGYN | Facility: CLINIC | Age: 29
End: 2019-03-14
Payer: COMMERCIAL

## 2019-03-14 VITALS
SYSTOLIC BLOOD PRESSURE: 107 MMHG | WEIGHT: 232.6 LBS | DIASTOLIC BLOOD PRESSURE: 72 MMHG | BODY MASS INDEX: 35.46 KG/M2 | HEART RATE: 97 BPM | OXYGEN SATURATION: 98 %

## 2019-03-14 DIAGNOSIS — Z23 NEED FOR DIPHTHERIA-TETANUS-PERTUSSIS (TDAP) VACCINE: ICD-10-CM

## 2019-03-14 DIAGNOSIS — O09.93 HIGH-RISK PREGNANCY IN THIRD TRIMESTER: ICD-10-CM

## 2019-03-14 DIAGNOSIS — O09.93 HIGH-RISK PREGNANCY IN THIRD TRIMESTER: Primary | ICD-10-CM

## 2019-03-14 PROCEDURE — 99207 ZZC PRENATAL VISIT: CPT | Performed by: OBSTETRICS & GYNECOLOGY

## 2019-03-14 PROCEDURE — 76816 OB US FOLLOW-UP PER FETUS: CPT | Performed by: RADIOLOGY

## 2019-03-14 PROCEDURE — 90715 TDAP VACCINE 7 YRS/> IM: CPT | Performed by: OBSTETRICS & GYNECOLOGY

## 2019-03-14 PROCEDURE — 90471 IMMUNIZATION ADMIN: CPT | Performed by: OBSTETRICS & GYNECOLOGY

## 2019-03-14 NOTE — NURSING NOTE
Screening Questionnaire for Adult Immunization    Are you sick today?   No   Do you have allergies to medications, food, a vaccine component or latex?   Yes   Have you ever had a serious reaction after receiving a vaccination?   No   Do you have a long-term health problem with heart disease, lung disease, asthma, kidney disease, metabolic disease (e.g. diabetes), anemia, or other blood disorder?   No   Do you have cancer, leukemia, HIV/AIDS, or any other immune system problem?   Yes   In the past 3 months, have you taken medications that affect  your immune system, such as prednisone, other steroids, or anticancer drugs; drugs for the treatment of rheumatoid arthritis, Crohn s disease, or psoriasis; or have you had radiation treatments?   No   Have you had a seizure, or a brain or other nervous system problem?   No   During the past year, have you received a transfusion of blood or blood     products, or been given immune (gamma) globulin or antiviral drug?   No   For women: Are you pregnant or is there a chance you could become        pregnant during the next month?   Yes   Have you received any vaccinations in the past 4 weeks?   No     Immunization questionnaire was positive for at least one answer.  Notified [facility].        Per orders of Dr. Caicedo, injection of Tdap given by Isabelle Arthur. Patient instructed to remain in clinic for 15 minutes afterwards, and to report any adverse reaction to me immediately.       Screening performed by Isabelle Arthur on 3/14/2019 at 9:18 AM.

## 2019-03-14 NOTE — PROGRESS NOTES
30w1d   Tired.  No HA, visual changes, N/V  Urine culture did not show UTI  3 hour OGT normal ranges.  Final ultrasound report reviewed.   RTC 2 wk, schedule ultrasound at that visit for the 34 week ultrasound   Luis Caicedo MD

## 2019-03-14 NOTE — PROGRESS NOTES
TDAP Vaccine (Adacel)     Date Status Dose VIS Date Route Site  Lot# Given By Verified By   3/14/2019 Given 0.5 mL 02/24/2015, Given Today IM RD [employer] [id number] Isabelle Arthur CMA --   Exp. Date NDC # Product Time Location External Comment   11/2/2020 65164-838-30 ADACEL --  --    Updated by: Isabelle Arthur CMA on 3/14/2019  9:14 AM

## 2019-03-18 ENCOUNTER — MYC MEDICAL ADVICE (OUTPATIENT)
Dept: OBGYN | Facility: CLINIC | Age: 29
End: 2019-03-18

## 2019-03-18 NOTE — TELEPHONE ENCOUNTER
See mychart message.     Dr. Caicedo discussed US findings at her last prenatal visit.  Recommended recheck with growth US at 34 weeks.  Plan was to order this at her next visit.

## 2019-03-18 NOTE — TELEPHONE ENCOUNTER
Will route to covering providers to review order request if needed    Brenna Nava  Women's Health

## 2019-03-25 ENCOUNTER — MYC MEDICAL ADVICE (OUTPATIENT)
Dept: OBGYN | Facility: CLINIC | Age: 29
End: 2019-03-25

## 2019-03-25 NOTE — TELEPHONE ENCOUNTER
Routing to provider to review ultrasound request for 34 wk     Brenna Nava  Women's Health

## 2019-03-26 ENCOUNTER — TRANSFERRED RECORDS (OUTPATIENT)
Dept: HEALTH INFORMATION MANAGEMENT | Facility: CLINIC | Age: 29
End: 2019-03-26

## 2019-03-26 LAB
AST SERPL-CCNC: 11 U/L (ref 8–43)
CREAT SERPL-MCNC: 0.8 MG/DL (ref 0.59–1.04)
GFR SERPL CREATININE-BSD FRML MDRD: >90 ML/MIN/BSA

## 2019-03-29 ENCOUNTER — PRENATAL OFFICE VISIT (OUTPATIENT)
Dept: OBGYN | Facility: CLINIC | Age: 29
End: 2019-03-29
Payer: COMMERCIAL

## 2019-03-29 VITALS
DIASTOLIC BLOOD PRESSURE: 75 MMHG | HEART RATE: 91 BPM | OXYGEN SATURATION: 97 % | WEIGHT: 234.7 LBS | SYSTOLIC BLOOD PRESSURE: 124 MMHG | BODY MASS INDEX: 35.78 KG/M2

## 2019-03-29 DIAGNOSIS — O09.92 SUPERVISION OF HIGH RISK PREGNANCY IN SECOND TRIMESTER: Primary | ICD-10-CM

## 2019-03-29 PROCEDURE — 99207 ZZC PRENATAL VISIT: CPT | Performed by: OBSTETRICS & GYNECOLOGY

## 2019-03-29 RX ORDER — LORATADINE 10 MG/1
10 TABLET ORAL
COMMUNITY
End: 2022-02-06

## 2019-03-29 RX ORDER — PREDNISONE 5 MG/1
TABLET ORAL
COMMUNITY
Start: 2019-03-26 | End: 2019-04-22

## 2019-03-29 NOTE — PATIENT INSTRUCTIONS
If you have any questions regarding your visit, Please contact your care team.    [facility]: [phone]      [facility] Health CLINIC HOURS TELEPHONE NUMBER   Evelina Astudillo DO.    ALEX Mcbride -    LEONIE Torres       Monday, Wednesday, Thursday and Friday, [place]  8:30a.m-5:00 p.m   [facility]  [address] [place] [address]  [phone] ask for [facility]    Imaging [phone]       Urgent Care locations:    [facility] Saturday and Sunday   9 am - 5 pm    Monday-Friday   12 pm - 8 pm  Saturday and Sunday   9 am - 5 pm   [phone]     [facility] Labor and Delivery:  [phone]    If you need a medication refill, please contact your pharmacy. Please allow 3 business days for your refill to be completed.  As always, Thank you for trusting us with your healthcare needs!

## 2019-03-29 NOTE — PROGRESS NOTES
She reports feeling reassuring daily fetal activity and will continue to record.  She gained 2 lbs since her last visit and denies any fluid leakage or regular uterine contractions.  She has her next [facility] visit scheduled.  She already received her Tdap vaccine.

## 2019-04-01 ENCOUNTER — MYC MEDICAL ADVICE (OUTPATIENT)
Dept: OBGYN | Facility: CLINIC | Age: 29
End: 2019-04-01
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
## 2019-04-03 ENCOUNTER — MYC MEDICAL ADVICE (OUTPATIENT)
Dept: OBGYN | Facility: CLINIC | Age: 29
End: 2019-04-03

## 2019-04-03 DIAGNOSIS — E03.9 HYPOTHYROIDISM: Primary | ICD-10-CM

## 2019-04-04 RX ORDER — LEVOTHYROXINE SODIUM 112 UG/1
112 TABLET ORAL DAILY
Qty: 90 TABLET | Refills: 3 | Status: CANCELLED | OUTPATIENT
Start: 2019-04-04

## 2019-04-04 NOTE — TELEPHONE ENCOUNTER
"Se White msg below:  \"The pharmacy [facility] phone: [phone]  I need a 90 day supply of Sythroid brand only\" .112mcg   Urgent !!!\"        Med is pended-  Routing to Dr Chacko- pls advise  Medication is reported is historical  "

## 2019-04-04 NOTE — TELEPHONE ENCOUNTER
It looks like her prescription was filled through her [facility] doctor.  Encounter closed.

## 2019-04-04 NOTE — TELEPHONE ENCOUNTER
Routing refill request to provider for review/approval because:  Medication is reported/historical.    Routing to provider to advise.  Anabella Cisneros RN, BSN

## 2019-04-08 ENCOUNTER — ANCILLARY PROCEDURE (OUTPATIENT)
Dept: ULTRASOUND IMAGING | Facility: CLINIC | Age: 29
End: 2019-04-08
Attending: OBSTETRICS & GYNECOLOGY
Payer: COMMERCIAL

## 2019-04-08 ENCOUNTER — NURSE TRIAGE (OUTPATIENT)
Dept: NURSING | Facility: CLINIC | Age: 29
End: 2019-04-08

## 2019-04-08 ENCOUNTER — PRENATAL OFFICE VISIT (OUTPATIENT)
Dept: OBGYN | Facility: CLINIC | Age: 29
End: 2019-04-08
Payer: COMMERCIAL

## 2019-04-08 ENCOUNTER — TELEPHONE (OUTPATIENT)
Dept: OBGYN | Facility: CLINIC | Age: 29
End: 2019-04-08

## 2019-04-08 VITALS
BODY MASS INDEX: 35.82 KG/M2 | WEIGHT: 235 LBS | HEART RATE: 74 BPM | DIASTOLIC BLOOD PRESSURE: 74 MMHG | SYSTOLIC BLOOD PRESSURE: 118 MMHG | OXYGEN SATURATION: 99 %

## 2019-04-08 DIAGNOSIS — O09.93 HIGH-RISK PREGNANCY IN THIRD TRIMESTER: Primary | ICD-10-CM

## 2019-04-08 DIAGNOSIS — R10.31 RLQ ABDOMINAL PAIN: ICD-10-CM

## 2019-04-08 DIAGNOSIS — O09.93 HIGH-RISK PREGNANCY IN THIRD TRIMESTER: ICD-10-CM

## 2019-04-08 PROCEDURE — 99207 ZZC PRENATAL VISIT: CPT | Performed by: OBSTETRICS & GYNECOLOGY

## 2019-04-08 PROCEDURE — 76815 OB US LIMITED FETUS(S): CPT | Performed by: RADIOLOGY
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
## 2019-04-08 NOTE — TELEPHONE ENCOUNTER
Appointment time passed. Patient seen in clinic today with Dr. He.    [place], [facility]

## 2019-04-08 NOTE — TELEPHONE ENCOUNTER
"Wilma was driving when she called.  I asked that she pull off the road in a safe place.    She's 33w5d, first pregnancy and having right side mid abdominal area that stated has started at 1:30 this morning and has been constant. She said it's crampy but she said she's not having contractions    Reason for Disposition    [1] MILD-MODERATE pain AND [2] constant AND [3] present > 2 hours    [1] MILD abdominal pain (e.g., doesn't interfere with normal activities) AND [2] constant AND [3] present > 2 hours    Additional Information    Negative: Shock suspected (e.g., cold/pale/clammy skin, too weak to stand, low BP, rapid pulse)    Negative: Difficult to awaken or acting confused  (e.g., disoriented, slurred speech)    Negative: Passed out (i.e., lost consciousness, collapsed and was not responding)    Negative: Sounds like a life-threatening emergency to the triager    Negative: [1] SEVERE pain (e.g., excruciating) AND [2] present > 1 hour    Negative: [1] SEVERE pain AND [2] age > 60    Negative: [1] Vomiting AND [2] contains red blood or black (\"coffee ground\") material  (Exception: few red streaks in vomit that only happened once)    Negative: Blood in bowel movements   (Exception: blood on surface of BM with constipation)    Negative: Black or tarry bowel movements  (Exception: chronic-unchanged  black-grey bowel movements AND is taking iron pills or Pepto-bismol)    Negative: Patient sounds very sick or weak to the triager    Negative: Passed out (i.e., lost consciousness, collapsed and was not responding)    Negative: Shock suspected (e.g., cold/pale/clammy skin, too weak to stand, low BP, rapid pulse)    Negative: Difficult to awaken or acting confused  (e.g., disoriented, slurred speech)    Negative: [1] SEVERE abdominal pain (e.g., excruciating) AND [2] constant AND [3] present > 1 hour    Negative: SEVERE vaginal bleeding (e.g., continuous red blood from vagina, or large blood clots)    Negative: Sounds like " a life-threatening emergency to the triager    Protocols used: ABDOMINAL PAIN - FEMALE-ADULT-AH, PREGNANCY - ABDOMINAL PAIN GREATER THAN 20 WEEKS EGA-ADULT-AH

## 2019-04-08 NOTE — TELEPHONE ENCOUNTER
Health Call Center    Phone Message    May a detailed message be left on voicemail: yes    Reason for Call: Symptoms or Concerns. Patient states she woke up at 1:30am with right side stomach cramping. No bleeding noted. No other symptoms. Patient has a 10:40 am appt today and would like to get in sooner. Please advise.     If patient has red-flag symptoms, warm transfer to triage line    Current symptom or concern: Stomach cramping on right side. Started this morning.     Symptoms have been present for: 5 hour(s)    Has patient previously been seen for this? No        Action Taken: Message routed to:  [facility] p [phone]

## 2019-04-08 NOTE — PATIENT INSTRUCTIONS
If you have any questions regarding your visit, Please contact your care team.     [employer]: [phone]  [facility] CLINIC HOURS TELEPHONE NUMBER       Bryn He M.D.        Janey-    RN-      Omni-Medical Assistant       Monday-[facility]  8:00a.m-4:45 p.m  Tuesday-[facility]  9:00a.m-4:00 p.m  Wednesday-[facility] 8:00a.m-4:45 p.m.  Thursday-[place]  8:00a.m-4:45 p.m.  Friday-[place]  8:00a.m-4:45 p.m. [facility]  [address]  [place], [place] [address] [phone] ask [facility]  [phone] Fax  Imaging [phone]    [facility]  [address] [place] [address] Dr.  [place], [place] [address] [phone]    [facility]  [address] [place] [address]  [phone] ask [facility]  [phone] Fax  Imaging [phone]     Urgent Care locations:    [place]        [place] Monday-Friday  5 pm - 9 pm  Saturday and Sunday   9 am - 5 pm  Monday-Friday   11 am - 9 pm  Saturday and Sunday   9 am - 5 pm   [phone]   If you need a medication refill, please contact your pharmacy. Please allow 3 business days for your refill to be completed.  As always, Thank you for trusting us with your healthcare needs!

## 2019-04-09 ENCOUNTER — NURSE TRIAGE (OUTPATIENT)
Dept: NURSING | Facility: CLINIC | Age: 29
End: 2019-04-09

## 2019-04-09 NOTE — TELEPHONE ENCOUNTER
"Having pain on her right side by her hip . Her OB told her to just ride it out and see what happens. It is a constant pain, not a contraction pain that comes and goes. It gets better with certain position changes, but then gets bad again.  Suggested heat and cold for the pain. Tub bath for Comfort.   If the pain changes to coming and going on both sides and from the back to the front , call back.    Dinorah Kinsey RN/ [facility]      Reason for Disposition    Caused by strained muscles or overuse from recent vigorous activity    Additional Information    Negative: Sounds like a life-threatening emergency to the triager    Negative: Chest pain    Negative: Difficulty breathing    Negative: Unable to walk    Negative: [1] Pregnant 23 or more weeks AND [2] baby is moving less today (e.g., kick count < 5 in 1 hour or < 10 in 2 hours)    Negative: SEVERE pain (e.g., excruciating, unable to do any normal activities)    Negative: [1] Red area or streak AND [2] fever    Negative: [1] Swollen joint AND [2] fever    Negative: [1] Cast on leg or ankle AND [2] now increased pain    Negative: Patient sounds very sick or weak to the triager    Negative: MODERATE pain (e.g., interferes with normal activities, limping)    Negative: [1] Thigh or calf pain AND [2] only 1 side AND [3] present > 1 hour    Negative: [1] Thigh, calf, or ankle swelling AND [2] only 1 side    Negative: [1] Thigh, calf, or ankle swelling AND [2] bilateral AND [3] 1 side is more swollen    Negative: Localized pain, redness or hard lump along vein    Negative: History of prior \"blood clot\" in leg or lungs (i.e., deep vein thrombosis, pulmonary embolism)    Negative: History of inherited increased risk of blood clots (e.g., Factor 5 Leiden, Anti-thrombin 3, Protein C or Protein S deficiency, Prothrombin mutation)    Negative: Recent long-distance travel with prolonged time in car, bus, plane, or train (i.e., within past 2 weeks; 6 or  more " "hours duration)    Negative: [1] Red area or streak AND [2] large (> 2 in. or 5 cm)    Negative: Caused by previously diagnosed varicose veins (i.e., same pain, worsened by prolonged standing, bulging veins in legs with worm-like appearance)    Negative: Numbness in leg or foot (i.e., loss of sensation)    Negative: Looks like a boil, infected sore, deep ulcer or other infected rash (spreading redness, pus)    Negative: [1] Painful rash AND [2] multiple small blisters grouped together (i.e., dermatomal distribution or \"band\" or \"stripe\")    Protocols used: PREGNANCY - LEG PAIN-ADULT-AH      "

## 2019-04-10 ENCOUNTER — MYC MEDICAL ADVICE (OUTPATIENT)
Dept: OBGYN | Facility: CLINIC | Age: 29
End: 2019-04-10

## 2019-04-10 ENCOUNTER — TELEPHONE (OUTPATIENT)
Dept: OBGYN | Facility: OTHER | Age: 29
End: 2019-04-10

## 2019-04-10 ENCOUNTER — TRANSFERRED RECORDS (OUTPATIENT)
Dept: HEALTH INFORMATION MANAGEMENT | Facility: CLINIC | Age: 29
End: 2019-04-10

## 2019-04-10 DIAGNOSIS — O09.93 HIGH-RISK PREGNANCY IN THIRD TRIMESTER: Primary | ICD-10-CM

## 2019-04-10 NOTE — TELEPHONE ENCOUNTER
Spoke with patient- patient would like to speak directly to Dr. Chacko over the phone regarding MFM's visit.  Patient very anxious about results and differing opinions on the planned C/Section.  Informed patient that Dr. Chacko is not in clinic today- but may be able to contact patient 4/11.  Patient would like to cancel appointment with Dr. Chacko in ER OB on 4/11 as she can not make this.  Forwarded to provider to advise.

## 2019-04-10 NOTE — TELEPHONE ENCOUNTER
Patient did schedule an appointment with Dr. Chacko for 4/11.  Dr. Chacko is not in office today.

## 2019-04-10 NOTE — TELEPHONE ENCOUNTER
Pt spoke with Janey and will wait to talk to with Dr. Chacko. Pt is aware that if Dr. Chacko wants to see her it will be Friday 4/12/2019.     Gloria Benavides RN on 4/10/2019 at 1:42 PM

## 2019-04-10 NOTE — TELEPHONE ENCOUNTER
[facility]    Phone Message    May a detailed message be left on voicemail: yes    Reason for Call: Other: Patient is requesting to discuss possibly planned  and discuss her MFM visit today, stating that she is getting mixed recommendations and would like to discuss that with Dr. Chacko. Please advise.     Action Taken: Message routed to:  [facility] p [phone]

## 2019-04-11 ENCOUNTER — NURSE TRIAGE (OUTPATIENT)
Dept: NURSING | Facility: CLINIC | Age: 29
End: 2019-04-11

## 2019-04-11 ENCOUNTER — TELEPHONE (OUTPATIENT)
Dept: OBGYN | Facility: OTHER | Age: 29
End: 2019-04-11

## 2019-04-11 ENCOUNTER — TELEPHONE (OUTPATIENT)
Dept: OBGYN | Facility: CLINIC | Age: 29
End: 2019-04-11

## 2019-04-11 ENCOUNTER — MYC MEDICAL ADVICE (OUTPATIENT)
Dept: OBGYN | Facility: CLINIC | Age: 29
End: 2019-04-11

## 2019-04-11 NOTE — TELEPHONE ENCOUNTER
Pt mycharted triage to ask about . Writer responded by informing pt she should talk with Dr. He at her next appointment regarding scheduling a section.     Gloria Benavides RN on 2019 at 1:18 PM    Closing this encounter.

## 2019-04-11 NOTE — TELEPHONE ENCOUNTER
Surgery Scheduled.    Date of Surgery 19 Time of Surgery 7:30am  Procedure:   Hospital/Surgical Facility: [facility]  Surgeon: Dr. Chacko  Type of Anesthesia Anticipated: Spinal    Pre-certification 19  Consent signed: na  Hospital Stay yes     Surgery Pre-Certification    Medical Record Number: [id number]  Wilma Palacios  YOB: 1990   Phone: [phone] (home)   Primary Provider: No Ref-Primary, Physician    Reason for Admit:  ankylosing spondylitis    Surgeon: Dr. Chacko  Surgical Procedure:   ICD-9 Coded: O09.93  Date of Surgery: 19  Consent signed? No    Date signed: NA  Hospital: [facility]  Inpatient- Length of stay:  3 days.    Requestor:  Brenna Nava     Location:  [facility]  Mailed surgery packet mailed to patient's home address.  Patient instructed NPO 12 hours prior to surgery, arrive 1 3/4 hours prior to surgery, must not have a .  Patient understood and agrees to the plan.     Brenna Nava  [facility]

## 2019-04-11 NOTE — TELEPHONE ENCOUNTER
Hochy eto message sent regarding pt switching to Dr. He as her primary OB provider.    Gloria Benavides RN on 4/11/2019 at 8:21 AM

## 2019-04-11 NOTE — TELEPHONE ENCOUNTER
Associated Diagnoses     High-risk pregnancy in third trimester  - Primary       Comments     There is no height or weight on file to calculate BMI.           Order Questions     Question Answer Comment   Procedure name(s) - multi select  Delivery    Reason for procedure ankylosing spondylitis    Is this a multi surgeon case? No    Laterality N/A    Request for additional equipment Other (see comments) None   Anesthesia Spinal    Positioning (if different from preference card): Supine    Is the patient known to have any of the following? None of the above    Initiate Pre-op orders for above procedure: Yes, as ordered in Epic additional orders noted there also   Location of Case: [facility]    Urgency of Surgery: Routine    Surgeon Procedure Time (incision to closure) in minutes (per procedure as applicable) 60    Note:  Surgical Case Time Needed (in minutes)   Surgery Date: 39-40 weeks Try Thursday May 16 either first case or over lunch.  Will need to block clinic appropriately   Patient Class (for admit prior to surgery, specify number of days in comments): Surgery admit admit day of surgery   H&P To Be Completed By: Surgeon     needed? No    Post-Op Appointment 6 weeks    Vendor Needed? No

## 2019-04-11 NOTE — TELEPHONE ENCOUNTER
Called patient to discuss her concerns.  Patient is well-known to me and I am aware of the rheumatology and anesthesiology recommendations.  I discussed her case with Dr. Dutta, who saw the patient yesterday.  Please see her consultation note.   Dr. Dutta talked to the rheumatologist, who did not recommend a .  His concern was that patients with ankylosing spondylitis are at increased risk for joint fusion.  He thought she would be less likely to have a successful vaginal birth.  He does not think that a trial of labor would be dangerous for her or worsen her ankylosing spondylitis.  The rheumatologist is planning to consider an MRI and plans to contact Wilma if he wants to obtain that.   Patient would like to try vaginal birth.  She would like to try that without an epidural, due to an increased risk for epidural hematoma.  Spinal is acceptable if  as needed. Her AS diagnosis is fairly recent and they do not expect excessive inflammation or scarring in the lumbar region.      She would like me to schedule a  delivery for her 39th week.  She would like to try labor if she goes into labor before then.  She would like to be able to elect  if she is having unacceptable pain in labor.  She understands that we can typically do a spinal in this setting, but she may need general anesthesia if emergent delivery is required or if the spinal is ineffective.    We will discuss the risks and benefits of  in more detail at future visits.       Estimated Date of Delivery: May 22, 2019     She would like to schedule a  with me for Thursday, May 16.  We will work on scheduling that.    She has canceled her appointments with me and scheduled the rest of her appointments with Dr. He.  She only wants him or me to do her  if possible.    I encouraged her to schedule some appointments with me prior to her .  If she would like to do her  with Dr. He, that can be  arranged.  She will discussed this with him at her next visit.    OR Anesthesia - Jaime Estevez MD - 03/29/2019 11:25 AM CDT  Anesthesia consults    I had the pleasure of talking to Wilma Palacios this mornign regarding questions/concerns related to her diagnosis of ankylosing spondylitis and its implications for labor analgesia and anesthesia.    She was diagnosed about 2 years ago with ankylosing spondylitis. Her symptoms are primarily in her SI joints and hands, denies significant symptoms in her lumbar spine. She was initially managed with Humira but has that has been discontinued during her pregnancy and she is taking low dose prednisone. There have been no known problems with the pregnancy and her PMH Is otherwise unremarkable with the exception of hypothyoidism.    I reviewed the spinal anatomy as it relates to epidural placement, the mechanism by which epidurals work and the potential ways in which ankylosing spondylitis can complicate epidural placement and function. Specifically calcification and reduced spine mobility can make the placement of an epidural more difficult. There is also an increased risk of failure after placement. These appear to be related to the duration, location and severity of the disease. Wilma appears to have reasonable lumbar flexibility, given her stage of pregnancy and as her diagnosis is relatively recent, I would not expect excessive inflammation or scarring in the lumbar region that would complicate an epidural. Without further imaging it is impossible to know for sure.     The other potential issue is that there appears to be an increase risk of epidural hematomas. This has been identified on case review studies and is poorly defined. The exact amount of the increased risk, if any, is unknown. I discussed with Wilma that the baseline risk for a signficant epidural hematoma is on the order of 1:250,000 but that should one occur it would likely require immediate  surgery and carries up to a 50% risk of permanent neurologic sequellae if not treated promptly. Wilma verbalized understanding and we discussed other, non neuraxial options for labor analagesia.    Wilma also inquired about the possibility of a c section. I deferred to OB to determine whether a section would be appropriate, however a spinal given for a c section would carry a lower risk of complications and would be a good option.    Thank you for givign me the opportunity to see Ms. Palacios; please do not hesitate to ask if you have any other questions.    Electronically Signed  Jaime Estevez MD  3/29/2019 11:45 AM

## 2019-04-11 NOTE — TELEPHONE ENCOUNTER
[facility]    Phone Message    May a detailed message be left on voicemail: yes    Reason for Call: Other: Pt requesting a call back to discuss availability/scheduling for Dr. He for her . Please advise.      Action Taken: p [id number]

## 2019-04-12 ENCOUNTER — MYC MEDICAL ADVICE (OUTPATIENT)
Dept: OBGYN | Facility: CLINIC | Age: 29
End: 2019-04-12

## 2019-04-12 ENCOUNTER — TELEPHONE (OUTPATIENT)
Dept: OBGYN | Facility: CLINIC | Age: 29
End: 2019-04-12

## 2019-04-12 NOTE — TELEPHONE ENCOUNTER
If the patient continues to have bleeding or contractions (also leaking fluid, decreased fetal movement) she will need to be seen at L&D.      The bleeding sounds like a small amount. She should go in if it continues however.  She should also go in if she has more than 6 contractions in an hour. She can go in any time she is concerned.  She should try to rest and stay hydrated.

## 2019-04-12 NOTE — TELEPHONE ENCOUNTER
"Called out to patient with surgery date. Patient was very adamant on having the provider that will deliver do the pre visit and postpartum as well. There were no open schedule for provide at desired location so I offered [place] for her PP. Patient stated that \" a drive that far is not in her plan, and that driving an hour will not work\". Patient asked to do an other date, but that still did not work out with the PP visit needed at the 6 week jing. She wanted her  canceled at this time till she is able to make an informed decision on who she will be having as her surgeon.   I did offer to schedule with another provider to do the Post partum visit, patient refused and stated that \" the provider has to do everything with our plan\"  Called [facility] and surgery is canceled at this time    Brenna Nava  [facility]          "

## 2019-04-12 NOTE — TELEPHONE ENCOUNTER
Notified pt of Dr. Chacko's recommendations below.  Pt verbalized understanding and agreed to plan.    Flor Churchill RN

## 2019-04-12 NOTE — TELEPHONE ENCOUNTER
Patient called in and feels that she has lost her mucus plug. She also has been bleeding and cramping. RN was rooming so I contacted [facility] team to assist- no response. Patient either hung up or got disconnected. Will need to call out    Brenna Nava  [facility]

## 2019-04-12 NOTE — TELEPHONE ENCOUNTER
Wilma is calling today because she went to the bathroom tonight and when she got up, there was a less than a dime size bloody spot in the toilet bowl but not on the toilet paper. No cramping, she had a hard BM earlier and had to strain a little to go. Denies any leaking of fluid at this time, but if she feels wet , or has more bleeding she will come to the hospital.    Dinorah Kinsey RN/ [facility]      Additional Information    Negative: Passed out (i.e., lost consciousness, collapsed and was not responding)    Negative: Shock suspected (e.g., cold/pale/clammy skin, too weak to stand, low BP, rapid pulse)    Negative: Difficult to awaken or acting confused  (e.g., disoriented, slurred speech)    Negative: SEVERE vaginal bleeding (e.g., continuous red blood from vagina, large blood clots)    Negative: [1] SEVERE abdominal pain (e.g., excruciating) AND [2] constant AND [3] present > 1 hour    Negative: Sounds like a life-threatening emergency to the triager    Negative: MILD-MODERATE vaginal bleeding (i.e., small to medium clots; like mild menstrual period)    Negative: Abdominal pain or having contractions    Negative: Leakage of fluid from vagina (or caller thinks she has ruptured her bag of liu)    Negative: Baby moving less today (e.g., kick count < 5 in 1 hour or < 10 in 2 hours)    Negative: [1] Pregnant 24-36 weeks () AND [2] pinkish or brownish mucous discharge    Single episode of faint spotting (e.g., noted when wiping after going to bathroom) (all triage questions negative)    Negative: Slight spotting after a pelvic examination (brief episode) (all triage questions negative)    Negative: Slight spotting after sexual intercourse (brief episode) (all triage questions negative)    Negative: [1] Pregnant > 36 weeks (term) AND [2] passed a small glob or chunk of mucous (may look like gelatin or snot) (all triage questions negative)    Negative: [1] Pregnant > 36 weeks AND [2]  pinkish or brownish mucous discharge (all triage questions negative)    Negative: [1] Pregnant 20-23 weeks AND [2] pinkish or brownish mucous discharge    Protocols used: PREGNANCY - VAGINAL BLEEDING GREATER THAN 20 WEEKS EG-ADULT-

## 2019-04-12 NOTE — TELEPHONE ENCOUNTER
Patient has multiple open encounters.  Will close this encounter.  See other mychart encounter from today

## 2019-04-12 NOTE — TELEPHONE ENCOUNTER
Called pt.  Please see the [employer] message dated yesterday, 4/12/19, for further documentation on my conversation with pt.    Flor Churchill RN

## 2019-04-12 NOTE — TELEPHONE ENCOUNTER
4.12.19  Patient wants a call back from Dr He.  Not a nurse.   Only Dr He.  She sent a [employer] message this morning.

## 2019-04-12 NOTE — TELEPHONE ENCOUNTER
"See ilda notes.     Due date is May 22.  She will be 39 weeks on Wednesday, May 15.  She was interested in surgery Thursday May 16.  I have offered to block my clinic schedule and rearrange patient to do her  that day.    I am on call May 17, and have offered to do her  that day if I am not \"In House\".   I also offered to do the  the following Monday or Tuesday.     I can work her in before the  Friday the  and/or Friday the .  I am at a conference Friday the . She is not willing to drive to [place] to see me.      She will be 6 weeks postpartum -July 3.  I am on call .  I am happy to see her in [place] or  in [place].  I can also see her a two week check, as I will be in [place] May 31, , and .      Patient is not comfortable with my availability, so she is welcome to see one of my partners.      Will close encounter, as she has multiple encounters open again  "

## 2019-04-12 NOTE — TELEPHONE ENCOUNTER
"Pt states she has not had any bleeding on toilet paper when going to the bathroom today.  She just went to the bathroom and noticed a small amount of blood on her underwear, she states the size of her pinky finger.  When she wiped she did not have any blood on toilet paper.  She states she is noticing very mild lower abdominal cramping, she states it is hardly noticeable and very intermittent.  Denies pain, contractions, vaginal symptoms.  Will route to OB/GYN for further recommendations.  Pt does not want to be evaluated in L&D, states it costs \"a lot of money\".    Flor Churchill, RN    "

## 2019-04-14 NOTE — TELEPHONE ENCOUNTER
I discussed this with the patient at 4/12 [facility] visit.  Bryn He MD

## 2019-04-14 NOTE — TELEPHONE ENCOUNTER
I discussed this with the patient at 4[place]12 [facility] [place].   Bryn He MD

## 2019-04-15 ENCOUNTER — MYC MEDICAL ADVICE (OUTPATIENT)
Dept: OBGYN | Facility: CLINIC | Age: 29
End: 2019-04-15

## 2019-04-22 ENCOUNTER — PRENATAL OFFICE VISIT (OUTPATIENT)
Dept: OBGYN | Facility: CLINIC | Age: 29
End: 2019-04-22
Payer: COMMERCIAL

## 2019-04-22 VITALS
SYSTOLIC BLOOD PRESSURE: 122 MMHG | DIASTOLIC BLOOD PRESSURE: 77 MMHG | OXYGEN SATURATION: 98 % | WEIGHT: 240 LBS | BODY MASS INDEX: 36.59 KG/M2 | HEART RATE: 86 BPM

## 2019-04-22 DIAGNOSIS — O09.93 HIGH-RISK PREGNANCY IN THIRD TRIMESTER: ICD-10-CM

## 2019-04-22 PROCEDURE — 99207 ZZC PRENATAL VISIT: CPT | Performed by: OBSTETRICS & GYNECOLOGY

## 2019-04-22 PROCEDURE — 87653 STREP B DNA AMP PROBE: CPT | Performed by: OBSTETRICS & GYNECOLOGY

## 2019-04-22 NOTE — PATIENT INSTRUCTIONS
If you have any questions regarding your visit, Please contact your care team.     [employer]: [phone]  [facility] CLINIC HOURS TELEPHONE NUMBER       Bryn He M.D.        Janey-    RN-      Omni-Medical Assistant       [facility]  8:00a.m-4:45 p.m  Tuesday-[facility]  9:00a.m-4:00 p.m  Wednesday-[facility] 8:00a.m-4:45 p.m.  Thursday-[place]  8:00a.m-4:45 p.m.  Friday-[place]  8:00a.m-4:45 p.m. [facility]  [address]  [place], [place] [address] [phone] ask [facility]  [phone] Fax  Imaging [phone]    [facility]  [address] [place] [address] Dr.  [place], [place] [address] [phone]    [facility]  [address] [place] [address]  [phone] ask [facility]  [phone] Fax  Imaging [phone]     Urgent Care locations:    [place]        [place] Monday-Friday  5 pm - 9 pm  Saturday and Sunday   9 am - 5 pm  Monday-Friday   11 am - 9 pm  Saturday and Sunday   9 am - 5 pm   [phone]   If you need a medication refill, please contact your pharmacy. Please allow 3 business days for your refill to be completed.  As always, Thank you for trusting us with your healthcare needs!

## 2019-04-23 ENCOUNTER — TELEPHONE (OUTPATIENT)
Dept: OBGYN | Facility: CLINIC | Age: 29
End: 2019-04-23

## 2019-04-23 LAB
GP B STREP DNA SPEC QL NAA+PROBE: NEGATIVE
SPECIMEN SOURCE: NORMAL

## 2019-04-23 NOTE — TELEPHONE ENCOUNTER
Associated Diagnoses     High-risk pregnancy in third trimester       Comments     Please schedule surgery as noted. Block out clinic time in [facility] as needed.   NPO 8 hours prior to procedure time and shower the night before or the morning of surgery.          Order Questions     Question Answer Comment   Procedure name(s) - multi select  Delivery    Reason for procedure Elective primary due to ankylosing spondylitis    Surgeon: Ying    Is this a multi surgeon case? No    Laterality N/A    Request for additional equipment Other (see comments) None   Anesthesia Spinal    Positioning (if different from preference card): Supine    Initiate Pre-op orders for above procedure: Yes, as ordered in Epic additional orders noted there also   Location of Case: [facility]    Urgency of Surgery: Routine    Surgeon Procedure Time (incision to closure) in minutes (per procedure as applicable) 60    Note:  Surgical Case Time Needed (in minutes)   Surgery Date: 39-40 weeks 5/15   Patient Class (for admit prior to surgery, specify number of days in comments): Surgery admit admit day of surgery   H&P To Be Completed By: Surgeon    [facility]    Post-Op Appointment 6 weeks    Vendor Needed? No

## 2019-04-23 NOTE — TELEPHONE ENCOUNTER
Surgery Scheduled.    Date of Surgery 5/15/19 Time of Surgery 12:30pm  Procedure:   [place] [facility]  [place]geon: Dr. He   Type of Anesthesia Anticipated: Spinal  Pre-op: TBT with Dr. He at   6 week post op 19 with Dr. He  at   Pre-certification 19  Consent signed: NA  Hospital Stay yes     Surgery Pre-Certification    Medical Record Number: [id number]  Wilma Palacios  YOB: 1990   Phone: [phone] (home)   Primary Provider: No Ref-Primary, Physician    Reason for Admit:  Elective primary due to ankylosing spondylitis    Surgeon: Bryn He MD  Surgical Procedure:   ICD-9 Coded: O09.90  Date of Surgery: 5/15/19  Consent signed? N/A    Date signed:   Hospital: [facility]  Inpatient- Length of stay:  3 days.    Requestor:  Brenna Nava     Location:  [facility]    mailedsurgery packet mailed to patient's home address.  Patient instructed NPO 12 hours prior to surgery, arrive 1 3/4 hours prior to surgery, must not have a .  Patient understood and agrees to the plan.     Brenna Nava  Women's Health

## 2019-04-23 NOTE — PROGRESS NOTES
No signif signs, symptoms or concerns except discussed her condition and plan for trial of labor and vaginal delivery if spontaneous labor prior to 39 weeks but patient desires elective  section at 39 weeks if undelivered due to her ankylosing spondylitis- discussed op, RBA. Has completed prednisone course but may need perioperative steroid boost. Advice appropriate to gestational age reviewed including pertinent Checklist items. Discussed condition, tests and care plan including RBA. Problem list updated.   A/P:  Wilma was seen today for prenatal care.    Diagnoses and all orders for this visit:    High-risk pregnancy in third trimester  -     Group B strep PCR  -     Makayla-Operative Worksheet      Addn: Patient did have interval follow-up at [place] including additional MFM and Anesthesiology consults. Anesthesiology there indicated more concern with epidural placement or ineffectiveness. Dr. Estevez at [facility] had concerns but was more optimistic.   Bryn He MD

## 2019-04-24 ENCOUNTER — NURSE TRIAGE (OUTPATIENT)
Dept: NURSING | Facility: CLINIC | Age: 29
End: 2019-04-24

## 2019-04-25 ENCOUNTER — TELEPHONE (OUTPATIENT)
Dept: FAMILY MEDICINE | Facility: CLINIC | Age: 29
End: 2019-04-25

## 2019-04-25 NOTE — TELEPHONE ENCOUNTER
"Wilma calling stating \"I have significant amount of swelling in my left foot that started 2 hours ago. I'm 36 weeks pregnant. No fever. Slightly red in that area, but I have been using ice packs which might be why it's red. I have also gain 3 lbs this week.\"    FNA paged on call provider for Betsy Haeys CAM Astudillo MD via [employer] at 8:25 pm to call patient back directly at [phone].    FNA advised patient to phone back FNA in 20 minutes if no response from on call MD and patient agreed.    Luly Humphrey, RN/[facility]    Reason for Disposition    [1] Pregnant > 20 weeks AND [2] sudden weight gain (i.e., more than 3 lbs or 1.4 kg in one week)    Additional Information    Negative: Severe difficulty breathing (e.g., struggling for each breath, speaks in single words)    Negative: Sounds like a life-threatening emergency to the triager    Negative: SEVERE leg swelling (e.g., swelling extends above knee, entire leg is swollen)    Negative: Chest pain    Negative: [1] Difficulty breathing AND [2] new onset or worsening    Negative: [1] Pregnant > 20 weeks AND [2] blurred vision or visual changes    Negative: [1] Pregnant > 20 weeks AND [2] severe headache AND [3] not relieved with acetaminophen (e.g., Tylenol)    Negative: [1] Pregnant > 20 weeks AND [2] upper abdominal pain lasts > 1 hour    Negative: [1] Pregnant 23 or more weeks AND [2] baby is moving less today (e.g., kick count < 5 in 1 hour or < 10 in 2 hours)    Negative: [1] Red area or streak AND [2] fever    Negative: [1] Swelling is painful to touch AND [2] fever    Negative: [1] Cast on leg or ankle AND [2] now increased pain    Negative: Patient sounds very sick or weak to the triager    Negative: [1] Pregnant > 20 weeks AND [2] swelling of face, arm or hands  (Exception: slight puffiness of fingers)    Protocols used: PREGNANCY - LEG SWELLING AND EDEMA-ADULT-AH      "

## 2019-04-25 NOTE — TELEPHONE ENCOUNTER
[place] to [place] [facility] [place]. Will await return of TC.    Berkley Wong CMA  April 25, 2019  1:53 PM

## 2019-04-25 NOTE — TELEPHONE ENCOUNTER
Pt has a High-risk pregnancy in third trimester. She called complaining left swelling and redness. Her OB/Gyn is unavailable today. Pt  denies breathing problems, chest pain exept redness and swelling. Notes she has been elevating her leg with no improvement. Pt was advised to see [facility] for further evaluation of symptoms. Pt verbalized understaing and agreement with plan.

## 2019-04-29 ENCOUNTER — PRENATAL OFFICE VISIT (OUTPATIENT)
Dept: OBGYN | Facility: CLINIC | Age: 29
End: 2019-04-29
Payer: COMMERCIAL

## 2019-04-29 ENCOUNTER — MYC MEDICAL ADVICE (OUTPATIENT)
Dept: OBGYN | Facility: CLINIC | Age: 29
End: 2019-04-29

## 2019-04-29 VITALS
HEART RATE: 81 BPM | SYSTOLIC BLOOD PRESSURE: 116 MMHG | WEIGHT: 244.1 LBS | BODY MASS INDEX: 37.21 KG/M2 | DIASTOLIC BLOOD PRESSURE: 78 MMHG

## 2019-04-29 DIAGNOSIS — O09.93 HIGH-RISK PREGNANCY IN THIRD TRIMESTER: ICD-10-CM

## 2019-04-29 PROCEDURE — 99207 ZZC PRENATAL VISIT: CPT | Performed by: OBSTETRICS & GYNECOLOGY

## 2019-04-29 NOTE — LETTER
[address] [address]  Phone: [phone]    19    Wilma Palacios  [facility] [address] [facility] [address]      To whom it may concern:     Wilma is under our care for her current pregnancy. She has a planned  for 5/15/19 and will be taking her last day 5/10/19 for leave. If you have any questions please call our office at [phone] and ask for Women's Health.    Sincerely,      Bryn He MD

## 2019-04-29 NOTE — TELEPHONE ENCOUNTER
Spoke to Kate from . Checked CPT code 18308, . ICD10 elective primary due to ankylosing spondylitis, High-risk pregnancy in third trimester O09.90.     No PA Required. Based on medical Necessity. This is a covered benefit under the patient's plan. Ref# [id number].

## 2019-04-29 NOTE — PATIENT INSTRUCTIONS
If you have any questions regarding your visit, Please contact your care team.     [facility]: [phone]  [facility] Health CLINIC HOURS TELEPHONE NUMBER       Bryn He M.D.        Janey-    Lorne Wolff-Medical Assistant       Monday-[place]  8:00a.m-4:45 p.m  Tuesday-[place]  9:00a.m-4:00 p.m  Wednesday-[place] 8:00a.m-4:45 p.m.  Thursday-[place]  8:00a.m-4:45 p.m.  Friday-[place]  8:00a.m-4:45 p.m. [facility]  [address] [place] [address]  [phone] ask for [facility]  [phone] Fax  Imaging [phone]    [facility] Labor and Delivery  [address] [place] [address] [place] [address]  [phone]    [facility]  [address] [place] [address]  [phone] ask [facility]  [phone] Fax  Imaging [phone]     Urgent Care locations:    [facility] Monday-Friday  5 pm - 9 pm  Saturday and Sunday   9 am - 5 pm  Monday-Friday   11 am - 9 pm  Saturday and Sunday   9 am - 5 pm   [phone]   If you need a medication refill, please contact your pharmacy. Please allow 3 business days for your refill to be completed.  As always, Thank you for trusting us with your healthcare needs!

## 2019-04-29 NOTE — PROGRESS NOTES
No signif signs, symptoms or concerns except edema. Advice appropriate to gestational age reviewed including pertinent Checklist items. Discussed condition, tests and care plan including RBA. Problem list updated. Preop review next.  A/P:  Wilma was seen today for prenatal care.    Diagnoses and all orders for this visit:    High-risk pregnancy in third trimester        Bryn He MD

## 2019-05-06 ENCOUNTER — PRENATAL OFFICE VISIT (OUTPATIENT)
Dept: OBGYN | Facility: CLINIC | Age: 29
End: 2019-05-06
Payer: COMMERCIAL

## 2019-05-06 VITALS
BODY MASS INDEX: 37.53 KG/M2 | SYSTOLIC BLOOD PRESSURE: 116 MMHG | HEART RATE: 85 BPM | DIASTOLIC BLOOD PRESSURE: 79 MMHG | WEIGHT: 246.2 LBS

## 2019-05-06 DIAGNOSIS — O09.93 HIGH-RISK PREGNANCY IN THIRD TRIMESTER: ICD-10-CM

## 2019-05-06 PROCEDURE — 99207 ZZC PRENATAL VISIT: CPT | Performed by: OBSTETRICS & GYNECOLOGY

## 2019-05-06 NOTE — PATIENT INSTRUCTIONS
If you have any questions regarding your visit, Please contact your care team.     [facility]: [phone]  [facility] Health CLINIC HOURS TELEPHONE NUMBER       Bryn He M.D.        Janey-    Lorne Wolff-Medical Assistant       Monday-[place]  8:00a.m-4:45 p.m  Tuesday-[place]  9:00a.m-4:00 p.m  Wednesday-[place] 8:00a.m-4:45 p.m.  Thursday-[place]  8:00a.m-4:45 p.m.  Friday-[place]  8:00a.m-4:45 p.m. [facility]  [address] [place] [address]  [phone] ask for [facility]  [phone] Fax  Imaging [phone]    [facility] Labor and Delivery  [address] [place] [address] [place] [address]  [phone]    [facility]  [address] [place] [address]  [phone] ask [facility]  [phone] Fax  Imaging [phone]     Urgent Care locations:    [facility] Monday-Friday  5 pm - 9 pm  Saturday and Sunday   9 am - 5 pm  Monday-Friday   11 am - 9 pm  Saturday and Sunday   9 am - 5 pm   [phone]   If you need a medication refill, please contact your pharmacy. Please allow 3 business days for your refill to be completed.  As always, Thank you for trusting us with your healthcare needs!

## 2019-05-07 NOTE — PROGRESS NOTES
No signif signs, symptoms or concerns.   The patient denies allergies, anesthesia problems, bleeding tendencies, corticosteroids, diabetes, thromboembolic phenonmenon, rheumatic fever, glaucoma, heart murmur, hepatitis, herbal supplements, recent illnesses, implanted devices, body jewelry, Latex sensitivity, transfusions, and tobacco use except for the following: corticosteroids in pregnancy and may consider steroid prep.  Preop instructions given.  Advice appropriate to gestational age reviewed including pertinent Checklist items. Discussed condition, tests and care plan including RBA. Problem list updated. Preop exam update next.  A/P:  Wilma was seen today for prenatal care.    Diagnoses and all orders for this visit:    High-risk pregnancy in third trimester        Bryn He MD

## 2019-05-13 ENCOUNTER — PRENATAL OFFICE VISIT (OUTPATIENT)
Dept: OBGYN | Facility: CLINIC | Age: 29
End: 2019-05-13
Payer: COMMERCIAL

## 2019-05-13 VITALS
HEART RATE: 80 BPM | DIASTOLIC BLOOD PRESSURE: 80 MMHG | SYSTOLIC BLOOD PRESSURE: 125 MMHG | TEMPERATURE: 97.8 F | OXYGEN SATURATION: 98 % | WEIGHT: 247 LBS | BODY MASS INDEX: 37.65 KG/M2

## 2019-05-13 DIAGNOSIS — O09.93 HIGH-RISK PREGNANCY IN THIRD TRIMESTER: ICD-10-CM

## 2019-05-13 PROCEDURE — 99207 ZZC PRENATAL VISIT: CPT | Performed by: OBSTETRICS & GYNECOLOGY

## 2019-05-13 NOTE — PATIENT INSTRUCTIONS
If you have any questions regarding your visit, Please contact your care team.     [facility]: [phone]  [facility] Health CLINIC HOURS TELEPHONE NUMBER       Bryn He M.D.        Janey-    Lorne Wolff-Medical Assistant       Monday-[place]  8:00a.m-4:45 p.m  Tuesday-[place]  9:00a.m-4:00 p.m  Wednesday-[place] 8:00a.m-4:45 p.m.  Thursday-[place]  8:00a.m-4:45 p.m.  Friday-[place]  8:00a.m-4:45 p.m. [facility]  [address] [place] [address]  [phone] ask for [facility]  [phone] Fax  Imaging [phone]    [facility] Labor and Delivery  [address] [place] [address] [place] [address]  [phone]    [facility]  [address] [place] [address]  [phone] ask [facility]  [phone] Fax  Imaging [phone]     Urgent Care locations:    [facility] Monday-Friday  5 pm - 9 pm  Saturday and Sunday   9 am - 5 pm  Monday-Friday   11 am - 9 pm  Saturday and Sunday   9 am - 5 pm   [phone]   If you need a medication refill, please contact your pharmacy. Please allow 3 business days for your refill to be completed.  As always, Thank you for trusting us with your healthcare needs!

## 2019-05-14 NOTE — PROGRESS NOTES
No signif signs, symptoms or concerns.   Exam: Head, Neck, Airway, Heart, and Lungs are neg/normal except class 2 airway.   Advice appropriate to gestational age reviewed including pertinent Checklist items. Discussed condition, tests and care plan including RBA. Problem list updated.   Satisfactory pre-anesthetic medical exam.    A/P:  Wilma was seen today for prenatal care.    Diagnoses and all orders for this visit:    High-risk pregnancy in third trimester        Bryn He MD

## 2019-05-21 ENCOUNTER — TELEPHONE (OUTPATIENT)
Dept: OBGYN | Facility: CLINIC | Age: 29
End: 2019-05-21

## 2019-05-21 ENCOUNTER — NURSE TRIAGE (OUTPATIENT)
Dept: NURSING | Facility: CLINIC | Age: 29
End: 2019-05-21

## 2019-05-21 NOTE — TELEPHONE ENCOUNTER
Wilma had caesarean on May 15th and discharged on Saturday morning and has not since then.  When Wilma stands up when getting out of bed on right side incision is burning.  Wilma is also aving painful urination with a brown discharge.  Not every urination is painful.  Wilma denies fever.  Wilma is requesting to speak with MD Bryn He.  Please phone Wilma at [phone].      Reason for Disposition    [1] SEVERE pain with urination  (e.g., excruciating) AND [2] not improved after 2 hours of pain medicine and Sitz bath    Additional Information    Negative: Shock suspected (e.g., cold/pale/clammy skin, too weak to stand, low BP, rapid pulse)    Negative: Sounds like a life-threatening emergency to the triager    Negative: [1] Unable to urinate (or only a few drops) > 4 hours AND [2] bladder feels very full (e.g., palpable bladder or strong urge to urinate)    Negative: Vomiting    Negative: Patient sounds very sick or weak to the triager    Protocols used: URINATION PAIN - FEMALE-A-AH

## 2019-05-21 NOTE — TELEPHONE ENCOUNTER
Pt also sent in a [email or web address] message regarding these symptoms.  Writed already responded to [email or web address] message. Please see [employer] encounter dated today, 5/21/19, for further documentation on this.    Flor Churchill RN

## 2019-05-21 NOTE — TELEPHONE ENCOUNTER
Clinic Action Needed:  Yes, callback  FNA Triage Call  Presenting Problem:    Wilma had caesarean on May 15th and discharged on Saturday morning and has not since then.  When Wilma stands up when getting out of bed on right side incision is burning.  Wilma is also aving painful urination with a brown discharge.  Not every urination is painful.  Wilma denies fever.  Wilma is requesting to speak with MD Bryn He.  Please phone Wilma at [phone].      Guideline Used: Urinary Symptoms  Patient Recommendations/Teaching: Seen within 4 hours  Routed to:  RN pool    Please be sure to close this encounter once this patient's issue/question has been addressed.    Melissa Sheffield RN/[facility]

## 2019-05-22 ENCOUNTER — NURSE TRIAGE (OUTPATIENT)
Dept: NURSING | Facility: CLINIC | Age: 29
End: 2019-05-22

## 2019-05-23 NOTE — TELEPHONE ENCOUNTER
"Caller states she is 1 week post partum from a . Caller states she has been having pain in her abdominal area for about 30 minutes. Caller states the pain comes and goes, and she rates the pain 7/10. Caller states she has not had a bowel movement in 3 days and only chantal comes out. Caller states she thinks the abdominal pain is from the constipation. Caller states she has taken some over the counter stool softener with no relief. Caller denies fever, temp is 97.2 orally. Triage guidelines recommend to go to ED. Caller states she will go to the Urgency room for assistance. It will take too long to wait for someone to go to pharmacy to get glycerin suppository and Miralax to promote bowel movement.     Reason for Disposition    Patient sounds very sick or weak to the triager    Additional Information    Negative: Passed out (i.e., lost consciousness, collapsed and was not responding)    Negative: Shock suspected (e.g., cold/pale/clammy skin, too weak to stand, low BP, rapid pulse)    Negative: Difficult to awaken or acting confused (e.g., disoriented, slurred speech)    Negative: Sounds like a life-threatening emergency to the triager    Negative: Followed an abdomen (stomach) injury    Negative: Pain is mainly in upper abdomen  (if needed ask: \"is it mainly above the belly button?\")    Negative:  incision pain is main symptom    Negative: > 1 month since delivery    Negative: [1] SEVERE abdominal pain AND [2] lasts > 1 hour    Negative: [1] Vomiting AND [2] contains red blood      (Exception: few streaks and only occurred once)    Negative: [1] Vomiting AND [2] contains black (\"coffee ground\") material    Negative: Fever > 100.4 F (38.0 C)    Negative: [1] Strong urge to urinate BUT [2] can't pass urine for > 4 hours (or can only pass few drops)    Negative: Blood in bowel movements      (Exception: small streak of blood on surface of BM with constipation)    Negative: Black or tarry bowel " movements  (Exception: chronic-unchanged  black-grey bowel movements AND is taking iron pills or Pepto-bismol)    Negative: Foul smelling vaginal discharge (i.e., lochia)    Negative: [1] Constant abdominal pain AND [2] present > 2 hours    Negative: [1] Vomiting AND [2] contains bile (green color)    Negative: [1] Vomiting AND [2] abdomen looks much more swollen than usual    Negative: White of the eyes have turned yellow (i.e., jaundice)    Negative: Blood in urine (red, pink, or tea-colored)    Negative: [1] Painful urination AND [2] frequency AND [3] urgency    Negative: Pain or burning with urination    Negative: [1] Mild pain comes and goes (e.g., crampy) AND [2] lasts > 3 days    Sounds like afterbirth pains (e.g., lower abdominal cramps that come and go, occurring days 1-4 postpartum)    Protocols used: POSTPARTUM - ABDOMINAL PAIN-A-AH

## 2019-05-28 ENCOUNTER — NURSE TRIAGE (OUTPATIENT)
Dept: NURSING | Facility: CLINIC | Age: 29
End: 2019-05-28

## 2019-05-28 NOTE — TELEPHONE ENCOUNTER
Patient reports having a  than one week ago, she needed enema done due to constipation.  Patient report last stool was Friday and it was liquid.  Patient says she she has been taking Miralax daily and does not have the urge to go.  FNA advised to stop the Muralax since it could be causing her symptoms.  Reviewed guideline and care advice with caller.  FNA advised to be seen if no stool within next couple of days.  Caller verbalizes understanding.        Additional Information    Negative: [1] Abdomen pain is main symptom AND [2] adult male    Negative: [1] Abdomen pain is main symptom AND [2] adult female    Negative: Rectal bleeding or blood in stool is main symptom    Negative: Rectal pain or itching is main symptom    Negative: Constipation in a cancer patient who is currently (or recently) receiving chemotherapy or radiation therapy, or cancer patient who has metastatic or end-stage cancer and is receiving palliative care    Negative: Patient sounds very sick or weak to the triager    Negative: [1] Vomiting AND [2] abdomen looks much more swollen than usual    Negative: [1] Vomiting AND [2] contains bile (green color)    Negative: [1] Constant abdominal pain AND [2] present > 2 hours    Negative: [1] Rectal pain or fullness from fecal impaction (rectum full of stool) AND [2] NOT better after SITZ bath, suppository or enema    Negative: [1] Intermittent mild abdominal pain AND [2] fever    Negative: Abdomen is more swollen than usual    Negative: Last bowel movement (BM) > 4 days ago    Negative: Leaking stool    Negative: Unable to have a bowel movement (BM) without manually removing stool (using finger to pull out stool or perform disimpaction)    Negative: Unable to have a bowel movement (BM) without laxative or enema    Negative: [1] Constipation persists > 1 week AND [2] no improvement after using CARE ADVICE    Negative: [1] Weight loss > 10 pounds (5 kg) AND [2] not dieting    Negative:  Pencil-like, narrow stools    Negative: Taking new prescription medication    Negative: [1] Minor bleeding from rectum (e.g., blood just on toilet paper, few drops, streaks on surface of normal formed BM) AND [2] 3 or more times    Negative: [1] Uses laxative (e.g., PEG / Miralax. Milk of magnesia) or enema AND [2] > once a month    Negative: Constipation is a chronic symptom (recurrent or ongoing AND present > 4 weeks)    Mild constipation    Protocols used: CONSTIPATION-A-AH

## 2019-06-03 ENCOUNTER — OFFICE VISIT (OUTPATIENT)
Dept: FAMILY MEDICINE | Facility: CLINIC | Age: 29
End: 2019-06-03
Payer: COMMERCIAL

## 2019-06-03 VITALS
RESPIRATION RATE: 14 BRPM | WEIGHT: 214 LBS | SYSTOLIC BLOOD PRESSURE: 115 MMHG | HEIGHT: 68 IN | TEMPERATURE: 98 F | DIASTOLIC BLOOD PRESSURE: 64 MMHG | BODY MASS INDEX: 32.43 KG/M2 | HEART RATE: 65 BPM

## 2019-06-03 DIAGNOSIS — N76.0 BV (BACTERIAL VAGINOSIS): ICD-10-CM

## 2019-06-03 DIAGNOSIS — B96.89 BV (BACTERIAL VAGINOSIS): ICD-10-CM

## 2019-06-03 DIAGNOSIS — N89.8 VAGINAL ITCHING: Primary | ICD-10-CM

## 2019-06-03 DIAGNOSIS — R82.81 PYURIA: ICD-10-CM

## 2019-06-03 LAB
ALBUMIN UR-MCNC: 30 MG/DL
APPEARANCE UR: CLEAR
BACTERIA #/AREA URNS HPF: ABNORMAL /HPF
BILIRUB UR QL STRIP: NEGATIVE
COLOR UR AUTO: YELLOW
GLUCOSE UR STRIP-MCNC: NEGATIVE MG/DL
HGB UR QL STRIP: ABNORMAL
KETONES UR STRIP-MCNC: NEGATIVE MG/DL
LEUKOCYTE ESTERASE UR QL STRIP: ABNORMAL
MUCOUS THREADS #/AREA URNS LPF: PRESENT /LPF
NITRATE UR QL: NEGATIVE
NON-SQ EPI CELLS #/AREA URNS LPF: ABNORMAL /LPF
PH UR STRIP: 5.5 PH (ref 5–7)
RBC #/AREA URNS AUTO: ABNORMAL /HPF
SOURCE: ABNORMAL
SP GR UR STRIP: 1.02 (ref 1–1.03)
SPECIMEN SOURCE: ABNORMAL
UROBILINOGEN UR STRIP-ACNC: 0.2 EU/DL (ref 0.2–1)
WBC #/AREA URNS AUTO: ABNORMAL /HPF
WET PREP SPEC: ABNORMAL

## 2019-06-03 PROCEDURE — 87210 SMEAR WET MOUNT SALINE/INK: CPT | Performed by: PHYSICIAN ASSISTANT

## 2019-06-03 PROCEDURE — 87086 URINE CULTURE/COLONY COUNT: CPT | Performed by: PHYSICIAN ASSISTANT

## 2019-06-03 PROCEDURE — 81001 URINALYSIS AUTO W/SCOPE: CPT | Performed by: PHYSICIAN ASSISTANT

## 2019-06-03 PROCEDURE — 99213 OFFICE O/P EST LOW 20 MIN: CPT | Performed by: PHYSICIAN ASSISTANT

## 2019-06-03 RX ORDER — METRONIDAZOLE 500 MG/1
500 TABLET ORAL 2 TIMES DAILY
Qty: 14 TABLET | Refills: 0 | Status: SHIPPED | OUTPATIENT
Start: 2019-06-03 | End: 2019-07-04

## 2019-06-03 ASSESSMENT — MIFFLIN-ST. JEOR: SCORE: 1752.77

## 2019-06-03 ASSESSMENT — PAIN SCALES - GENERAL: PAINLEVEL: NO PAIN (0)

## 2019-06-03 NOTE — PROGRESS NOTES
"Subjective     Wilma Palacios is a 29 year old female who presents to clinic today for the following health issues:    HPI   Vaginal Symptoms  Onset: After she gave birth 5/15/19    Description:  Vaginal Discharge: none   Itching (Pruritis): YES- off and on   Burning sensation:  YES- when urinating, only happens intermittently   Odor: no     Accompanying Signs & Symptoms:  Pain with Urination: YES- intermittently   Abdominal Pain: no   Fever: no     History:   Sexually active: YES  New Partner: no   Possibility of Pregnancy:  No    Precipitating factors:   Recent Antibiotic Use: no     Alleviating factors:  None    Therapies Tried and outcome: None     -She would like her stitches looked at today as well.           Reviewed and updated as needed this visit by Provider         Review of Systems   Remainder of ROS obtained and found to be negative other than that which was documented above        Objective    /64   Pulse 65   Temp 98  F (36.7  C) (Tympanic)   Resp 14   Ht 1.725 m (5' 7.91\")   Wt 97.1 kg (214 lb)   LMP 08/15/2018   BMI 32.62 kg/m    Body mass index is 32.62 kg/m .  Physical Exam   GENERAL: healthy, alert and no distress  EYES: Eyes grossly normal to inspection, PERRL and conjunctivae and sclerae normal  RESP: lungs clear to auscultation - no rales, rhonchi or wheezes  CV: regular rates and rhythm, normal S1 S2, no S3 or S4 and no murmur, click or rub  ABDOMEN: bowel sounds normal. Well healing scars    Diagnostic Test Results:  Wet prep: clue cells         Assessment & Plan     (N89.8) Vaginal itching  (primary encounter diagnosis)  Comment:   Plan: Wet prep            (N39.0) Pyuria  Comment:   Plan: UA with Microscopic reflex to Culture, Urine         Culture Aerobic Bacterial            (N76.0,  B96.89) BV (bacterial vaginosis)  Comment:   Plan: metroNIDAZOLE (FLAGYL) 500 MG tablet                Return in about 1 week (around 6/10/2019) for If not improving or worsening.    Lea LEON" COSTA Avalos  [facility]

## 2019-06-04 LAB
BACTERIA SPEC CULT: NORMAL
Lab: NORMAL
SPECIMEN SOURCE: NORMAL

## 2019-06-10 ENCOUNTER — MYC MEDICAL ADVICE (OUTPATIENT)
Dept: FAMILY MEDICINE | Facility: CLINIC | Age: 29
End: 2019-06-10

## 2019-06-13 ENCOUNTER — NURSE TRIAGE (OUTPATIENT)
Dept: NURSING | Facility: CLINIC | Age: 29
End: 2019-06-13

## 2019-06-14 NOTE — TELEPHONE ENCOUNTER
Patient says she has not been drinking enough water.  Patient reports 16 oz of water, 20 oz of coffee, and some pop.  Patient reports dark brown urine and is concerned about it.  Patient denies flank pain and fever.  Patient currently on antibiotic for BV.  Patient has sent a message to her doctor's office to respond too.  Reviewed care advice with caller.  FNA advised caller to monitor symptoms and call back with worsening symptoms or if call has questions/concerns.  Caller verbalizes understanding.      Reason for Disposition    All other urine symptoms    Additional Information    Negative: Has to get out of bed to urinate > 2 times a night (i.e., nocturia)    Negative: Dribbling (losing urine) just after finishing urination (i.e., post-void dribbling)    Negative: Urination is difficult to start (i.e., hesitancy) or straining    Negative: [1] Can't control passage of urine (i.e., urinary incontinence, wetting self) AND [2] present > 2 weeks    Negative: Side (flank) or lower back pain present    Negative: [1] Can't control passage of urine (i.e., urinary incontinence) AND [2] new onset (< 2 weeks) or worsening    Negative: Urinating more frequently than usual (i.e., frequency)    Negative: Bad or foul-smelling urine    Negative: Fever > 100.5 F (38.1 C)    Negative: [1] Decreased urination and [2] drinking very little AND [2] dehydration suspected (e.g., dark urine, no urine > 12 hours, very dry mouth, very lightheaded)    Negative: Patient sounds very sick or weak to the triager    Negative: [1] Unable to urinate (or only a few drops) > 4 hours AND     [2] bladder feels very full (e.g., palpable bladder or strong urge to urinate)    Negative: Followed a genital area injury    Negative: Followed a genital area injury (penis, scrotum)    Negative: Vaginal discharge    Negative: Pus (white, yellow) or bloody discharge from end of penis    Negative: [1] Taking antibiotic for urinary tract infection (UTI) AND [2]  female    Negative: [1] Taking antibiotic for urinary tract infection (UTI) AND [2] male    Negative: [1] Discomfort (pain, burning or stinging) when passing urine AND [2] pregnant    Negative: [1] Discomfort (pain, burning or stinging) when passing urine AND [2] postpartum < 1 month    Negative: [1] Discomfort (pain, burning or stinging) when passing urine AND [2] female    Negative: [1] Discomfort (pain, burning or stinging) when passing urine AND [2] male    Negative: Pain or itching in the vulvar area    Negative: Pain in scrotum is main symptom    Negative: Blood in the urine is main symptom    Negative: Symptoms arising from use of a urinary catheter (Pascual or Coude)    Negative: Shock suspected (e.g., cold/pale/clammy skin, too weak to stand, low BP, rapid pulse)    Negative: Sounds like a life-threatening emergency to the triager    Protocols used: URINARY SYMPTOMS-A-AH

## 2019-06-16 ENCOUNTER — MYC MEDICAL ADVICE (OUTPATIENT)
Dept: OBGYN | Facility: CLINIC | Age: 29
End: 2019-06-16

## 2019-06-26 DIAGNOSIS — B96.89 BV (BACTERIAL VAGINOSIS): Primary | ICD-10-CM

## 2019-06-26 DIAGNOSIS — N76.0 BV (BACTERIAL VAGINOSIS): Primary | ICD-10-CM

## 2019-06-26 RX ORDER — METRONIDAZOLE 7.5 MG/G
1 GEL VAGINAL DAILY
Qty: 25 G | Refills: 0 | Status: SHIPPED | OUTPATIENT
Start: 2019-06-26 | End: 2019-07-04

## 2019-07-01 ENCOUNTER — PRENATAL OFFICE VISIT (OUTPATIENT)
Dept: OBGYN | Facility: CLINIC | Age: 29
End: 2019-07-01
Payer: COMMERCIAL

## 2019-07-01 VITALS
OXYGEN SATURATION: 95 % | WEIGHT: 211.7 LBS | DIASTOLIC BLOOD PRESSURE: 72 MMHG | SYSTOLIC BLOOD PRESSURE: 119 MMHG | BODY MASS INDEX: 32.27 KG/M2 | HEART RATE: 79 BPM

## 2019-07-01 DIAGNOSIS — Z30.09 GENERAL COUNSELING FOR PRESCRIPTION OF ORAL CONTRACEPTIVES: ICD-10-CM

## 2019-07-01 LAB
HGB BLD-MCNC: 14 G/DL (ref 11.7–15.7)
TSH SERPL DL<=0.005 MIU/L-ACNC: 1.81 MU/L (ref 0.4–4)

## 2019-07-01 PROCEDURE — 00000218 ZZHCL STATISTIC OBHBG - HEMOGLOBIN: Performed by: OBSTETRICS & GYNECOLOGY

## 2019-07-01 PROCEDURE — 84443 ASSAY THYROID STIM HORMONE: CPT | Performed by: OBSTETRICS & GYNECOLOGY

## 2019-07-01 PROCEDURE — 36415 COLL VENOUS BLD VENIPUNCTURE: CPT | Performed by: OBSTETRICS & GYNECOLOGY

## 2019-07-01 PROCEDURE — 99207 ZZC POST PARTUM EXAM: CPT | Performed by: OBSTETRICS & GYNECOLOGY

## 2019-07-01 RX ORDER — LEVONORGESTREL AND ETHINYL ESTRADIOL 0.15-0.03
1 KIT ORAL DAILY
Qty: 84 TABLET | Refills: 3 | Status: SHIPPED | OUTPATIENT
Start: 2019-07-01 | End: 2021-01-15

## 2019-07-01 ASSESSMENT — PATIENT HEALTH QUESTIONNAIRE - PHQ9: SUM OF ALL RESPONSES TO PHQ QUESTIONS 1-9: 4

## 2019-07-01 NOTE — PATIENT INSTRUCTIONS
If you have any questions regarding your visit, Please contact your care team.     [facility]: [phone]  [facility] CLINIC HOURS TELEPHONE NUMBER       Bryn He M.D.        Janey-    Lorne Wolff-Medical Assistant       Monday-[place]  8:00a.m-4:45 p.m  Tuesday-[place]  9:00a.m-4:00 p.m  Wednesday-[place] 8:00a.m-4:45 p.m.  Thursday-[place]  8:00a.m-4:45 p.m.  Friday-[place]  8:00a.m-4:45 p.m. [facility]  [address] [place] [address]  [phone] ask for [facility]  [phone] Fax  Imaging [phone]    [facility]  [address] [place] [address] [place] [address]  [phone]    [facility]  [address] [place] [address]  [phone] ask [facility]  [phone] Fax  Imaging [phone]     Urgent Care locations:    [facility] Monday-Friday  5 pm - 9 pm  Saturday and Sunday   9 am - 5 pm  Monday-Friday   11 am - 9 pm  Saturday and Sunday   9 am - 5 pm   [phone]   If you need a medication refill, please contact your pharmacy. Please allow 3 business days for your refill to be completed.  As always, Thank you for trusting us with your healthcare needs!

## 2019-07-04 PROBLEM — O09.90 HIGH-RISK PREGNANCY: Status: RESOLVED | Noted: 2018-11-02 | Resolved: 2019-07-04

## 2019-07-04 PROBLEM — M46.1 SACROILIAC INFLAMMATION (H): Status: RESOLVED | Noted: 2018-11-02 | Resolved: 2019-07-04

## 2019-07-04 PROBLEM — E03.9 HYPOTHYROID IN PREGNANCY, ANTEPARTUM: Status: RESOLVED | Noted: 2018-11-02 | Resolved: 2019-07-04

## 2019-07-04 PROBLEM — O99.280 HYPOTHYROID IN PREGNANCY, ANTEPARTUM: Status: RESOLVED | Noted: 2018-11-02 | Resolved: 2019-07-04

## 2019-07-04 NOTE — PROGRESS NOTES
Wilma Palacios is a 29 year old year old G 1 P 1 who is here today for a postpartum exam.    HPI:      Doing well and without signif sx or concerns. Constipation is better. Suture knot came out. Back on Humira. Currently bottle feeding infant. Here today with infant daughter. Infant doing well. Contraceptive method planned is OC. NSA since delivery. PP depression screening as noted. See PHQ-9. Score = 8.    Past medical, obstetrical, surgical, family and social history reviewed and as noted or updated in chart.     Allergies, meds and supplements are as noted or updated in chart.      ROS:     Systems reviewed include constitutional; breast;                 cardiac; respiratory; gastrointestinal; genitourinary;                                musculoskeletal; integumentary; psychological;                                hematologic/lymphatic and endocrine.                  These systems were negative for significant symptoms except                 for the following: none and see HPI.                                Exam:  VS as noted.                    Abd and Pelvis were                             normal or negative except for, or in particular noting, the following                pertinent findings: none. Wd A.       Assessment: Postpartum exam    Plan and Recommendations: Symptoms, problems and concerns reviewed.  Discussed pregnancy, birth, future pregnancy plans, work plans and infant care issues.  Problem list updated and Pregnancy Episode closed. See orders. RTC in 12 months.  Medications and prescriptions given as noted.  I reviewed side effects, risks, benefits and instructions on proper use.    Total encounter time (physician together with patient) = 20min. Direct counseling, education and care coordination time (physician together with patient) = 15min.    Wilma was seen today for postpartum care.    Diagnoses and all orders for this visit:    Routine postpartum follow-up  -     OB HEMOGLOBIN  -     TSH  with free T4 reflex    General counseling for prescription of oral contraceptives  -     levonorgestrel-ethinyl estradiol (NORDETTE) 0.15-30 MG-MCG tablet; Take 1 tablet by mouth daily        Bryn He MD

## 2019-07-18 ENCOUNTER — OFFICE VISIT (OUTPATIENT)
Dept: ORTHOPEDICS | Facility: CLINIC | Age: 29
End: 2019-07-18
Payer: COMMERCIAL

## 2019-07-18 VITALS — SYSTOLIC BLOOD PRESSURE: 102 MMHG | BODY MASS INDEX: 32.27 KG/M2 | DIASTOLIC BLOOD PRESSURE: 64 MMHG | HEIGHT: 68 IN

## 2019-07-18 DIAGNOSIS — M79.641 CHRONIC HAND PAIN, RIGHT: Primary | ICD-10-CM

## 2019-07-18 DIAGNOSIS — G89.29 CHRONIC HAND PAIN, RIGHT: Primary | ICD-10-CM

## 2019-07-18 PROCEDURE — 99203 OFFICE O/P NEW LOW 30 MIN: CPT | Mod: 25 | Performed by: FAMILY MEDICINE

## 2019-07-18 PROCEDURE — 20604 DRAIN/INJ JOINT/BURSA W/US: CPT | Mod: RT | Performed by: FAMILY MEDICINE

## 2019-07-18 RX ORDER — BETAMETHASONE SODIUM PHOSPHATE AND BETAMETHASONE ACETATE 3; 3 MG/ML; MG/ML
6 INJECTION, SUSPENSION INTRA-ARTICULAR; INTRALESIONAL; INTRAMUSCULAR; SOFT TISSUE
Status: SHIPPED | OUTPATIENT
Start: 2019-07-18

## 2019-07-18 RX ORDER — ROPIVACAINE HYDROCHLORIDE 5 MG/ML
1 INJECTION, SOLUTION EPIDURAL; INFILTRATION; PERINEURAL
Status: SHIPPED | OUTPATIENT
Start: 2019-07-18

## 2019-07-18 RX ADMIN — ROPIVACAINE HYDROCHLORIDE 1 ML: 5 INJECTION, SOLUTION EPIDURAL; INFILTRATION; PERINEURAL at 10:10

## 2019-07-18 RX ADMIN — BETAMETHASONE SODIUM PHOSPHATE AND BETAMETHASONE ACETATE 6 MG: 3; 3 INJECTION, SUSPENSION INTRA-ARTICULAR; INTRALESIONAL; INTRAMUSCULAR; SOFT TISSUE at 10:10

## 2019-07-18 NOTE — PATIENT INSTRUCTIONS
Diagnosis: Right 2nd Finger joint injection  Image Findings: neg hand XR from outside  Treatment: repeat steroid injection   Medications: previously prescribed  Follow-up: As needed    [facility] Injection Discharge Instructions      You may shower, however avoid swimming, tub baths or hot tubs for 24 hours following your procedure    You may have a mild to moderate increase in pain for several days following the injection.    It may take up to 14 days for the steroid medication to start working although you may feel the effect as early as a few days after the procedure.    You may use ice packs for 10-15 minutes, 3 to 4 times a day at the injection site for comfort    You may use anti-inflammatory medications (such as Ibuprofen or Aleve or Advil) or Tylenol for pain control if necessary    If you were fasting, you may resume your normal diet and medications after the procedure    If you have diabetes, check your blood sugar more frequently than usual as your blood sugar may be higher than normal for 10-14 days following a steroid injection. Contact your doctor who manages your diabetes if your blood sugar is higher than usual      If you experience any of the following, call [facility] @ [phone] or [phone]  -Fever over 100 degree F  -Swelling, bleeding, redness, drainage, warmth at the injection site  - New or worsening pain

## 2019-07-18 NOTE — PROGRESS NOTES
ASSESSMENT & PLAN  Wilma was seen today for pain.    Diagnoses and all orders for this visit:    Chronic hand pain, right  -     Small Joint Injection/Arthrocentesis: R index MCP      Patient is a 29 year old female presenting for evaluation of   Chief Complaint   Patient presents with     Right Hand - Pain      2nd MTP Pain: Pt has known HO ankylosing spondylitis with sx controlled with Humira except for 2nd MCP joint.  Pt has notable TTP with pos grind with previous XR neg for arthritis taken at outside facility.  Given this plan as below, f/u PRN  Treatment: 2nd MCP steroid injection  Physical Therapy none  Injection completed as below  Medications Per rheumatologist, otherwise limited NSAIDs/Tylenol    Concerning signs/sx that would warrant urgent evaluation were discussed.  All questions were answered, patient understands and agrees with plan.      Return if symptoms worsen or fail to improve.    -----    SUBJECTIVE  Wilma Palacios is a/an 29 year old Right handed female who is seen as a self referral for evaluation of right hand pain. The patient is seen by themselves.    Onset: 4 week(s) ago. Reports insidious onset without acute precipitating event.  Had steroid injection 8/17 sig improvement 4 wks after up until a couple of weeks ago.  Pt delivered 5/19 baby girl  Location of Pain: right hand   Rating of Pain at worst: 7.5/10  Rating of Pain Currently: 5/10  Worsened by: typing, point tenderness   Better with: steroid injection at [place] 8/17/18  Treatments tried: Humera   Associated symptoms: no distal numbness or tingling; denies swelling or warmth  Orthopedic history: YES -is being treated at [facility] for spondyloarthropathy   Relevant surgical history: NO  Past Medical History:   Diagnosis Date     Ankylosing spondylitis with multisystem involvement (H) 2012     Thyroid disease 2008     Social History     Socioeconomic History     Marital status:      Spouse name: Kennedy     Number of children: 1  "    Years of education: Not on file     Highest education level: Not on file   Occupational History     Occupation:    Social Needs     Financial resource strain: Not on file     Food insecurity:     Worry: Not on file     Inability: Not on file     Transportation needs:     Medical: Not on file     Non-medical: Not on file   Tobacco Use     Smoking status: Former Smoker     Last attempt to quit: 2013     Years since quittin.5     Smokeless tobacco: Never Used   Substance and Sexual Activity     Alcohol use: Yes     Comment:  not in PG     Drug use: No     Sexual activity: Yes     Partners: Male   Lifestyle     Physical activity:     Days per week: Not on file     Minutes per session: Not on file     Stress: Not on file   Relationships     Social connections:     Talks on phone: Not on file     Gets together: Not on file     Attends Mosque service: Not on file     Active member of club or organization: Not on file     Attends meetings of clubs or organizations: Not on file     Relationship status: Not on file     Intimate partner violence:     Fear of current or ex partner: Not on file     Emotionally abused: Not on file     Physically abused: Not on file     Forced sexual activity: Not on file   Other Topics Concern     Parent/sibling w/ CABG, MI or angioplasty before 65F 55M? Not Asked   Social History Narrative     Not on file         Patient's past medical, surgical, social, and family histories were reviewed today and no changes are noted.    REVIEW OF SYSTEMS:  10 point ROS is negative other than symptoms noted above in HPI, Past Medical History or as stated below  Constitutional: NEGATIVE for fever, chills, change in weight  Skin: NEGATIVE for worrisome rashes, moles or lesions  GI/: NEGATIVE for bowel or bladder changes  Neuro: NEGATIVE for weakness, dizziness or paresthesias    OBJECTIVE:  /64   Ht 1.725 m (5' 7.91\")   BMI 32.27 kg/m     General: healthy, alert and " in no distress  HEENT: no scleral icterus or conjunctival erythema  Skin: no suspicious lesions or rash. No jaundice.  CV: regular rhythm by palpation  Resp: normal respiratory effort without conversational dyspnea   Psych: normal mood and affect  Gait: normal steady gait with appropriate coordination and balance  Neuro: normal light touch sensory exam of the bilateral hands.    MSK:  RIGHT HAND  Inspection:    No swelling or obvious deformity or asymmetry  Palpation:   Carpals: normal   Metacarpals: 2nd metacarpal   Thumb: normal   Fingers: normal  Range of Motion:    Mild pain with 2nd digit flex/extension otherwise full ROM flexion/extension at other digits  Strength:     Mild pain with 2nd digit resisted flex/extension otherwise full strength flexion/extension at other digits    Special Tests:    Positive: none    Negative: palpable triggering, flexor digitorum superficialis testing, flexor digitorum profundus testing    Independent visualization of the below image:  No results found for this or any previous visit (from the past 24 hour(s)).  Outside hand XR negative for fracture/arthritis    Small Joint Injection/Arthrocentesis: R index MCP  Date/Time: 7/18/2019 10:10 AM  Performed by: Jomar Silver MD  Authorized by: Jomar Silver MD   Indications:  Pain  Needle Size:  27 G  Guidance: ultrasound     Approach:  Dorsal  Location:  Index finger    Site:  R index MCP                    Medications:  6 mg betamethasone acet & sod phos 6 (3-3) MG/ML; 1 mL ropivacaine 5 MG/ML        Outcome:  Tolerated well, no immediate complications  Procedure discussed: discussed risks, benefits, and alternatives    Consent Given by:  Patient  Timeout: timeout called immediately prior to procedure    Prep: patient was prepped and draped in usual sterile fashion       Patient reported significant improvement of pain after injection.  Aftercare instructions given to patient.  Plan to follow-up as discussed above.      MD [facility]            Patient's conditions were thoroughly discussed during today's visit with greater than 50% of the visit spent counseling the patient with total time spent face-to-face with the patient being 25 minutes.    MD [facility]

## 2019-07-18 NOTE — LETTER
7/18/2019         RE: Wilma Palacios  [address]  [facility] [address]        Dear Colleague,    Thank you for referring your patient, Wilma Palacios, to the [facility] [place]. Please see a copy of my visit note below.    ASSESSMENT & PLAN  Wilma was seen today for pain.    Diagnoses and all orders for this visit:    Chronic hand pain, right  -     Small Joint Injection/Arthrocentesis: R index MCP      Patient is a 29 year old female presenting for evaluation of   Chief Complaint   Patient presents with     Right Hand - Pain      2nd MTP Pain: Pt has known HO ankylosing spondylitis with sx controlled with Humira except for 2nd MCP joint.  Pt has notable TTP with pos grind with previous XR neg for arthritis taken at outside facility.  Given this plan as below, f/u PRN  Treatment: 2nd MCP steroid injection  Physical Therapy none  Injection completed as below  Medications Per rheumatologist, otherwise limited NSAIDs/Tylenol    Concerning signs/sx that would warrant urgent evaluation were discussed.  All questions were answered, patient understands and agrees with plan.      Return if symptoms worsen or fail to improve.    -----    SUBJECTIVE  Wilma Palacios is a/an 29 year old Right handed female who is seen as a self referral for evaluation of right hand pain. The patient is seen by themselves.    Onset: 4 week(s) ago. Reports insidious onset without acute precipitating event.  Had steroid injection 8/17 sig improvement 4 wks after up until a couple of weeks ago.  Pt delivered 5/19 baby girl  Location of Pain: right hand   Rating of Pain at worst: 7.5/10  Rating of Pain Currently: 5/10  Worsened by: typing, point tenderness   Better with: steroid injection at [place] 8/17/18  Treatments tried: Humera   Associated symptoms: no distal numbness or tingling; denies swelling or warmth  Orthopedic history: YES -is being treated at [facility] for spondyloarthropathy   Relevant surgical history:  NO  Past Medical History:   Diagnosis Date     Ankylosing spondylitis with multisystem involvement (H) 2012     Thyroid disease 2008     Social History     Socioeconomic History     Marital status:      Spouse name: Kennedy     Number of children: 1     Years of education: Not on file     Highest education level: Not on file   Occupational History     Occupation:    Social Needs     Financial resource strain: Not on file     Food insecurity:     Worry: Not on file     Inability: Not on file     Transportation needs:     Medical: Not on file     Non-medical: Not on file   Tobacco Use     Smoking status: Former Smoker     Last attempt to quit: 2013     Years since quittin.5     Smokeless tobacco: Never Used   Substance and Sexual Activity     Alcohol use: Yes     Comment:  not in PG     Drug use: No     Sexual activity: Yes     Partners: Male   Lifestyle     Physical activity:     Days per week: Not on file     Minutes per session: Not on file     Stress: Not on file   Relationships     Social connections:     Talks on phone: Not on file     Gets together: Not on file     Attends Muslim service: Not on file     Active member of club or organization: Not on file     Attends meetings of clubs or organizations: Not on file     Relationship status: Not on file     Intimate partner violence:     Fear of current or ex partner: Not on file     Emotionally abused: Not on file     Physically abused: Not on file     Forced sexual activity: Not on file   Other Topics Concern     Parent/sibling w/ CABG, MI or angioplasty before 65F 55M? Not Asked   Social History Narrative     Not on file         Patient's past medical, surgical, social, and family histories were reviewed today and no changes are noted.    REVIEW OF SYSTEMS:  10 point ROS is negative other than symptoms noted above in HPI, Past Medical History or as stated below  Constitutional: NEGATIVE for fever, chills, change in  "weight  Skin: NEGATIVE for worrisome rashes, moles or lesions  GI/: NEGATIVE for bowel or bladder changes  Neuro: NEGATIVE for weakness, dizziness or paresthesias    OBJECTIVE:  /64   Ht 1.725 m (5' 7.91\")   BMI 32.27 kg/m      General: healthy, alert and in no distress  HEENT: no scleral icterus or conjunctival erythema  Skin: no suspicious lesions or rash. No jaundice.  CV: regular rhythm by palpation  Resp: normal respiratory effort without conversational dyspnea   Psych: normal mood and affect  Gait: normal steady gait with appropriate coordination and balance  Neuro: normal light touch sensory exam of the bilateral hands.    MSK:  RIGHT HAND  Inspection:    No swelling or obvious deformity or asymmetry  Palpation:   Carpals: normal   Metacarpals: 2nd metacarpal   Thumb: normal   Fingers: normal  Range of Motion:    Mild pain with 2nd digit flex/extension otherwise full ROM flexion/extension at other digits  Strength:     Mild pain with 2nd digit resisted flex/extension otherwise full strength flexion/extension at other digits    Special Tests:    Positive: none    Negative: palpable triggering, flexor digitorum superficialis testing, flexor digitorum profundus testing    Independent visualization of the below image:  No results found for this or any previous visit (from the past 24 hour(s)).  Outside hand XR negative for fracture/arthritis    Small Joint Injection/Arthrocentesis: R index MCP  Date/Time: 7/18/2019 10:10 AM  Performed by: Jomar Silver MD  Authorized by: Jomar Silver MD   Indications:  Pain  Needle Size:  27 G  Guidance: ultrasound     Approach:  Dorsal  Location:  Index finger    Site:  R index MCP                    Medications:  6 mg betamethasone acet & sod phos 6 (3-3) MG/ML; 1 mL ropivacaine 5 MG/ML        Outcome:  Tolerated well, no immediate complications  Procedure discussed: discussed risks, benefits, and alternatives    Consent Given by:  Patient  Timeout: " timeout called immediately prior to procedure    Prep: patient was prepped and draped in usual sterile fashion       Patient reported significant improvement of pain after injection.  Aftercare instructions given to patient.  Plan to follow-up as discussed above.     MD [facility]            Patient's conditions were thoroughly discussed during today's visit with greater than 50% of the visit spent counseling the patient with total time spent face-to-face with the patient being 25 minutes.    MD [facility]        Again, thank you for allowing me to participate in the care of your patient.        Sincerely,        Jomar Silver MD

## 2019-08-22 ENCOUNTER — MYC MEDICAL ADVICE (OUTPATIENT)
Dept: OBGYN | Facility: CLINIC | Age: 29
End: 2019-08-22

## 2019-08-22 DIAGNOSIS — E03.9 HYPOTHYROIDISM, UNSPECIFIED TYPE: Primary | ICD-10-CM

## 2019-08-22 RX ORDER — LEVOTHYROXINE SODIUM 112 MCG
112 TABLET ORAL DAILY
Qty: 90 TABLET | Refills: 3 | Status: SHIPPED | OUTPATIENT
Start: 2019-08-22 | End: 2020-11-16

## 2019-08-22 RX ORDER — LEVOTHYROXINE SODIUM 112 UG/1
112 TABLET ORAL
Status: CANCELLED | OUTPATIENT
Start: 2019-08-22

## 2019-08-22 NOTE — TELEPHONE ENCOUNTER
"Synthroid is on pt's med list as \"pt reported\".  Per TSH lab result:  Written by Bryn He MD on 7/1/2019 11:36 AM   Satisfactory results- continue with same treatment and plan as discussed.   Continue same levothyroxine. No anemia- extra iron is not needed now.    "

## 2019-10-03 ENCOUNTER — MYC MEDICAL ADVICE (OUTPATIENT)
Dept: OBGYN | Facility: CLINIC | Age: 29
End: 2019-10-03

## 2019-10-03 DIAGNOSIS — N76.0 BV (BACTERIAL VAGINOSIS): ICD-10-CM

## 2019-10-03 DIAGNOSIS — B96.89 BV (BACTERIAL VAGINOSIS): ICD-10-CM

## 2019-10-03 NOTE — TELEPHONE ENCOUNTER
Pt has symptoms of BV again, doesn't want to come in to be treated.   Routing to MD Gloria Benavides, RN on 10/3/2019 at 12:44 PM

## 2019-10-04 RX ORDER — METRONIDAZOLE 7.5 MG/G
1 GEL VAGINAL DAILY
Qty: 25 G | Refills: 0 | Status: SHIPPED | OUTPATIENT
Start: 2019-10-04 | End: 2021-01-15

## 2019-10-04 NOTE — TELEPHONE ENCOUNTER
Please call patient and notify her that the script for metrogel was sent to the [facility] [place] - this was what she was treated with in June. If symptoms do not improve with this then I would advise that she be seen. She mentioned in her messaging to OB/GYN office that she doesn't want to keep being tested for this but from her chart she has only really been tested one time in the last several months. When she was prescribed the metrogel in June it was based upon her symptoms and not testing was done so we are treating with this based upon the assumption that it is still a BV infection and not something else    Lea

## 2019-10-04 NOTE — TELEPHONE ENCOUNTER
Call placed to patient.  Relayed ELAN Avalos's message.  Patient verbalizes understanding, agrees with plan.  Kenya Blas RN

## 2019-10-08 ENCOUNTER — TRANSFERRED RECORDS (OUTPATIENT)
Dept: HEALTH INFORMATION MANAGEMENT | Facility: CLINIC | Age: 29
End: 2019-10-08

## 2019-10-08 ENCOUNTER — NURSE TRIAGE (OUTPATIENT)
Dept: NURSING | Facility: CLINIC | Age: 29
End: 2019-10-08

## 2019-10-09 NOTE — TELEPHONE ENCOUNTER
"    Reason for Disposition    [1] MILD-MODERATE pain AND [2] constant AND [3] present > 2 hours    Additional Information    Negative: Shock suspected (e.g., cold/pale/clammy skin, too weak to stand, low BP, rapid pulse)    Negative: Passed out (i.e., lost consciousness, collapsed and was not responding)    Negative: Sounds like a life-threatening emergency to the triager    Negative: Menstrual cramps is main concern    Negative: Abdominal (stomach) pain is main concern    Negative: Vulvar (external genital area) pain or symptoms (e.g., dryness, sores, redness, blisters, bumps) is main concern    Negative: Rectal pain is main concern    Negative: Hip pain is main concern    Negative: Urination pain is main concern (pain with passing urine)    Negative: [1] Pelvic pain AND [2] pregnant < 20 weeks    Negative: [1] Pelvic pain AND [2] pregnant > 20 weeks    Negative: [1] Pelvic pain AND [2] postpartum < 1 month since delivery    Negative: Pain associated with known hernia or new-onset hernia suspected (reducible bulge in groin/abdomen)    Negative: Followed an abdomen (stomach) injury    Negative: Followed a genital area injury    Negative: [1] SEVERE pelvic pain (e.g., excruciating) AND [2] vomiting    Negative: [1] SEVERE pelvic pain AND [2] present > 1 hour    Negative: SEVERE vaginal bleeding (i.e., soaking 2 pads or tampons per hour and present 2 or more hours)    Negative: Patient sounds very sick or weak to the triager    Answer Assessment - Initial Assessment Questions  1. LOCATION: \"Where does it hurt?\"       Pelvic pain/pressure  2. RADIATION: \"Does the pain shoot anywhere else?\" (e.g., lower back, groin, thighs)      no  3. ONSET: \"When did the pain begin?\" (e.g., minutes, hours or days ago)       2 days  4. SUDDEN: \"Gradual or sudden onset?\"      gradual  5. PATTERN \"Does the pain come and go, or is it constant?\"     - If constant: \"Is it getting better, staying the same, or worsening?\"       (Note: " "Constant means the pain never goes away completely; most serious pain is constant and gets worse over time)      - If intermittent: \"How long does it last?\" \"Do you have pain now?\"      (Note: Intermittent means the pain goes away completely between bouts)      constant  6. SEVERITY: \"How bad is the pain?\"  (e.g., Scale 1-10; mild, moderate, or severe)    - MILD (1-3): doesn't interfere with normal activities, area soft and not tender to touch     - MODERATE (4-7): interferes with normal activities or awakens from sleep, tender to touch     - SEVERE (8-10): excruciating pain, doubled over, unable to do any normal activities       Mild-moderate  7. RECURRENT SYMPTOM: \"Have you ever had this type of pelvic pain before?\" If so, ask: \"When was the last time?\" and \"What happened that time?\"       no  8. CAUSE: \"What do you think is causing the pelvic pain?\"      ?recent BV  9. RELIEVING/AGGRAVATING FACTORS: \"What makes it better or worse?\" (e.g., activity/rest, sexual intercourse, voiding, passing stool)      no  10. OTHER SYMPTOMS: \"Has there been any other symptoms?\" (e.g., fever, vaginal bleeding, vaginal discharge, diarrhea, constipation, or voiding problems?\"        Foul smelling uring  11. PREGNANCY: \"Is there any chance you are pregnant?\" \"When was your last menstrual period?\"        Post partum    Protocols used: PELVIC PAIN - FEMALE-A-AH      "

## 2019-10-17 ENCOUNTER — MYC MEDICAL ADVICE (OUTPATIENT)
Dept: FAMILY MEDICINE | Facility: CLINIC | Age: 29
End: 2019-10-17

## 2019-10-18 ENCOUNTER — OFFICE VISIT (OUTPATIENT)
Dept: OBGYN | Facility: CLINIC | Age: 29
End: 2019-10-18
Payer: COMMERCIAL

## 2019-10-18 VITALS
SYSTOLIC BLOOD PRESSURE: 111 MMHG | DIASTOLIC BLOOD PRESSURE: 76 MMHG | HEART RATE: 67 BPM | WEIGHT: 212.6 LBS | BODY MASS INDEX: 32.41 KG/M2

## 2019-10-18 DIAGNOSIS — N89.8 VAGINAL ITCHING: ICD-10-CM

## 2019-10-18 DIAGNOSIS — B37.31 YEAST INFECTION OF THE VAGINA: ICD-10-CM

## 2019-10-18 DIAGNOSIS — R31.9 HEMATURIA, UNSPECIFIED TYPE: ICD-10-CM

## 2019-10-18 DIAGNOSIS — R30.0 DYSURIA: Primary | ICD-10-CM

## 2019-10-18 LAB
ALBUMIN UR-MCNC: 30 MG/DL
APPEARANCE UR: CLEAR
BACTERIA #/AREA URNS HPF: ABNORMAL /HPF
BILIRUB UR QL STRIP: NEGATIVE
COLOR UR AUTO: YELLOW
GLUCOSE UR STRIP-MCNC: NEGATIVE MG/DL
HGB UR QL STRIP: ABNORMAL
KETONES UR STRIP-MCNC: 15 MG/DL
LEUKOCYTE ESTERASE UR QL STRIP: ABNORMAL
MUCOUS THREADS #/AREA URNS LPF: PRESENT /LPF
NITRATE UR QL: NEGATIVE
NON-SQ EPI CELLS #/AREA URNS LPF: ABNORMAL /LPF
PH UR STRIP: 5.5 PH (ref 5–7)
RBC #/AREA URNS AUTO: ABNORMAL /HPF
SOURCE: ABNORMAL
SP GR UR STRIP: >1.03 (ref 1–1.03)
SPECIMEN SOURCE: ABNORMAL
UROBILINOGEN UR STRIP-ACNC: 0.2 EU/DL (ref 0.2–1)
WBC #/AREA URNS AUTO: ABNORMAL /HPF
WET PREP SPEC: ABNORMAL

## 2019-10-18 PROCEDURE — 81001 URINALYSIS AUTO W/SCOPE: CPT | Performed by: OBSTETRICS & GYNECOLOGY

## 2019-10-18 PROCEDURE — 87086 URINE CULTURE/COLONY COUNT: CPT | Performed by: OBSTETRICS & GYNECOLOGY

## 2019-10-18 PROCEDURE — 87210 SMEAR WET MOUNT SALINE/INK: CPT | Performed by: OBSTETRICS & GYNECOLOGY

## 2019-10-18 PROCEDURE — 99214 OFFICE O/P EST MOD 30 MIN: CPT | Performed by: OBSTETRICS & GYNECOLOGY

## 2019-10-18 RX ORDER — MICONAZOLE NITRATE 20 MG/G
CREAM TOPICAL 2 TIMES DAILY
Qty: 4 G | Refills: 1 | Status: SHIPPED | OUTPATIENT
Start: 2019-10-18 | End: 2021-01-15

## 2019-10-18 RX ORDER — CIPROFLOXACIN 500 MG/1
500 TABLET, FILM COATED ORAL 2 TIMES DAILY
Qty: 14 TABLET | Refills: 0 | Status: SHIPPED | OUTPATIENT
Start: 2019-10-18 | End: 2020-11-19

## 2019-10-18 NOTE — PATIENT INSTRUCTIONS
If you have any questions regarding your visit, Please contact your care team.  To [facility] [employer] [facility]: [phone]  [facility] CLINIC HOURS TELEPHONE NUMBER   Cephas Agbeh, M.D. Lisa -       Lorne Tidwell         Monday-[place]    8:00a.m-4:45 p.m    Tuesday--[place]     8:00a.m-4:45 p.m.    Thursday-[place]    8:00a.m-4:45 p.m.    Friday-[place]    8:00a.m-4:45 p.m    [facility]   [address] [place] [address]   [phone]-Ask for [facility]   Fax [phone]   [phone]     [facility]   [address] [place] [address] [place] [address]   [phone]    [facility]  [address] [facility] [address]  [phone]  [phone]   Urgent Care locations:    [facility] Monday-Friday  5 pm - 9 pm  Saturday and Sunday   9 am - 5 pm   Monday-Friday   5 pm - 9 pm  Saturday and Sunday  9 am - 5 pm    [phone]   If you need a medication refill, please contact your pharmacy. Please allow 3 business days for your refill to be completed.  As always, Thank you for trusting us with your healthcare needs!

## 2019-10-18 NOTE — TELEPHONE ENCOUNTER
Call placed to patient.  Patient was seen in urgency room, treated with antibiotic for UTI.  Patient unsure of name, completed 7 da BID course 2 days ago.  Patient still having urinary urgency,pressure and burning with void.  Denies blood in urine, or cloudy or odor.  C/O low abdominal ache.  Denies new back pain, has chronic back pain.  Afebrile.  Denies nausea, has URI with sore throat. Has not been drinking good amounts fluid.  Encourage po fluids, warm tea(decaf) with honey for sore throat.  Advised clinic visit today at site that can take UC to ensure proper antibiotic coverage if needed.  Patient verbalizes understanding, agrees.  Transferred to scheduling for appointment near work in [place].  Kenya Blas RN

## 2019-10-18 NOTE — PROGRESS NOTES
Wilma is a 29 year old  referred here by self for consultation regarding recurrent vaginitis and uti and hematuria.. She was treated with keflex 500 mg po BIDx 7 days on 10/8/19. No urine culture.Also treated for Vaginal yeast with diflucan 150 mg x1  She was also already on metrogel for presumed BV.  Her symptoms persist.  Denies back pain..    ROS: Ten point review of systems was reviewed and negative except the above.    Gyne: - abn pap (last pap ), - STD's    Past Medical History:   Diagnosis Date     Ankylosing spondylitis with multisystem involvement (H) 2012     Thyroid disease      Past Surgical History:   Procedure Laterality Date      SECTION       COLONOSCOPY  2015     EXTRACTION(S) DENTAL  2009     Patient Active Problem List   Diagnosis     Ankylosing spondylitis of multiple sites in spine (H)     Allergic rhinitis     Anxiety     Chronic chest wall pain     HLA-B27 spondyloarthropathy     Hypothyroidism       ALL/Meds: Her medication and allergy histories were reviewed and are documented in their appropriate chart areas.    SH: - tob, - EtOH,     FH: Her family history was reviewed and documented in its appropriate chart area.    PE: /76   Pulse 67   Wt 96.4 kg (212 lb 9.6 oz)   LMP 2019 (Approximate)   Breastfeeding? No   BMI 32.41 kg/m    Body mass index is 32.41 kg/m .    General:  WNWD female, NAD  Alert  Oriented x 3  Gait:  Normal  Skin:  Normal skin turgor  HEENT:  NC/AT, EOMI  PSYCH; normal.   BACK: NO CVA tenderness.  Abdomen:  Suprapubic tenderness.  Pelvic exam:  Not performed  Extremities:  No clubbing, no cyanosis and no edema.    Results for orders placed or performed in visit on 10/18/19   *UA reflex to Microscopic and Culture ([facility] (except [place] and [facility])   Result Value Ref Range    Color Urine Yellow     Appearance Urine Clear     Glucose Urine Negative NEG^Negative mg/dL    Bilirubin Urine Negative  NEG^Negative    Ketones Urine 15 (A) NEG^Negative mg/dL    Specific Gravity Urine >1.030 1.003 - 1.035    Blood Urine Moderate (A) NEG^Negative    pH Urine 5.5 5.0 - 7.0 pH    Protein Albumin Urine 30 (A) NEG^Negative mg/dL    Urobilinogen Urine 0.2 0.2 - 1.0 EU/dL    Nitrite Urine Negative NEG^Negative    Leukocyte Esterase Urine Trace (A) NEG^Negative    Source Midstream Urine    Urine Microscopic   Result Value Ref Range    WBC Urine 5-10 (A) OTO5^0 - 5 /HPF    RBC Urine 5-10 (A) OTO2^O - 2 /HPF    Squamous Epithelial /LPF Urine Many (A) FEW^Few /LPF    Bacteria Urine Few (A) NEG^Negative /HPF    Mucous Urine Present (A) NEG^Negative /LPF   Wet prep   Result Value Ref Range    Specimen Description Vagina     Wet Prep No Trichomonas seen     Wet Prep No clue cells seen     Wet Prep Few  Yeast seen   (A)     Wet Prep Few  WBC'S seen          A/P      ICD-10-CM    1. Dysuria R30.0 *UA reflex to Microscopic and Culture ([facility] (except [place] and [place])     Urine Culture Aerobic Bacterial     Urine Microscopic     UROLOGY ADULT REFERRAL     ciprofloxacin (CIPRO) 500 MG tablet     miconazole (MICATIN) 2 % external cream     CANCELED: UA reflex to Microscopic   2. Vaginal itching N89.8 Wet prep   3. Hematuria, unspecified type R31.9 UROLOGY ADULT REFERRAL     ciprofloxacin (CIPRO) 500 MG tablet   4. Yeast infection of the vagina B37.3 miconazole (MICATIN) 2 % external cream       I encouraged UROLOGY consult referral.  ACOG pamphlet provided on vaginitis.    25 minutes was spent face to face with the patient today discussing her history, diagnosis, and follow-up plan as noted above.  Over 50% of the visit was spent in counseling and coordination of care.    Total Visit Time: 30 minutes.      CEPHAS AGBEH, MD.

## 2019-10-20 LAB
BACTERIA SPEC CULT: NORMAL
SPECIMEN SOURCE: NORMAL

## 2019-10-21 ENCOUNTER — MYC MEDICAL ADVICE (OUTPATIENT)
Dept: UROLOGY | Facility: CLINIC | Age: 29
End: 2019-10-21

## 2019-10-30 ENCOUNTER — NURSE TRIAGE (OUTPATIENT)
Dept: NURSING | Facility: CLINIC | Age: 29
End: 2019-10-30

## 2019-10-30 NOTE — TELEPHONE ENCOUNTER
Caller  reports  URI symptoms for  14 days; productive cough for 4 days; no fever  Caller is on Humera and is immuno suppressed;inquiring if a  Virtual visit is an option for her   Triage protocol reviewed   Advised to be seen in person due to complex history   advised  [facility] as it works with her schedule   Caller  will comply and proceed   Kira Montemayor RN  FNA        Additional Information    Negative: Severe difficulty breathing (e.g., struggling for each breath, speaks in single words)    Negative: Sounds like a life-threatening emergency to the triager    Negative: Runny nose is caused by pollen or other allergies    Negative: Cough is main symptom    Negative: Severe sore throat    Negative: Fever > 104 F (40 C)    Negative: [1] Difficulty breathing AND [2] not severe AND [3] not from stuffy nose (e.g., not relieved by cleaning out the nose)    Negative: Patient sounds very sick or weak to the triager    Negative: [1] Fever > 101 F (38.3 C) AND [2] age > 60    Negative: [1] Fever > 100.0 F (37.8 C) AND [2] bedridden (e.g., nursing home patient, CVA, chronic illness, recovering from surgery)    Negative: [1] Fever > 100.0 F (37.8 C) AND [2] diabetes mellitus or weak immune system (e.g., HIV positive, cancer chemo, splenectomy, chronic steroids)    Negative: Fever present > 3 days (72 hours)    Negative: [1] Fever returns after gone for over 24 hours AND [2] symptoms worse or not improved    Negative: [1] Sinus pain (not just congestion) AND [2] fever    Negative: Earache    Negative: [1] SEVERE sore throat AND [2] present > 24 hours    Negative: [1] Sinus congestion (pressure, fullness) AND [2] present > 10 days    Negative: [1] Nasal discharge AND [2] present > 10 days    Negative: [1] Using nasal washes and pain medicine > 24 hours AND [2] sinus pain (lower forehead, cheekbone, or eye) persists    Negative: Sores with yellow scabs around the nasal opening    Negative: Cold with no complications     Negative: Care advice for mild cough, questions about    Negative: Care advice for fever, questions about    Negative: Zinc, vitamin C, and echinacea to treat the common cold, questions about    Negative: Neti Pot, questions about    Negative: [1] Previous asthma attacks AND [2] this feels like asthma attack    Negative: Dry (non-productive) cough (i.e., no sputum or minimal clear sputum)    Negative: Severe difficulty breathing (e.g., struggling for each breath, speaks in single words)    Negative: Bluish (or gray) lips or face now    Negative: [1] Difficulty breathing AND [2] exposure to flames, smoke, or fumes    Negative: [1] Stridor AND [2] difficulty breathing    Negative: Sounds like a life-threatening emergency to the triager    Negative: Chest pain  (Exception: MILD central chest pain, present only when coughing)    Negative: Difficulty breathing    Negative: Patient sounds very sick or weak to the triager    Negative: [1] Coughed up blood AND [2] > 1 tablespoon (15 ml) (Exception: blood-tinged sputum)    Negative: Fever > 103 F (39.4 C)    Negative: [1] Fever > 101 F (38.3 C) AND [2] age > 60    Negative: [1] Fever > 100.0 F (37.8 C) AND [2] bedridden (e.g., nursing home patient, CVA, chronic illness, recovering from surgery)    Negative: [1] Fever > 100.0 F (37.8 C) AND [2] diabetes mellitus or weak immune system (e.g., HIV positive, cancer chemo, splenectomy, chronic steroids)     No fever but  Immuno supressed    Negative: Wheezing is present    Protocols used: COMMON COLD-A-AH, COUGH - ACUTE [id number]

## 2019-11-18 ENCOUNTER — MYC MEDICAL ADVICE (OUTPATIENT)
Dept: FAMILY MEDICINE | Facility: CLINIC | Age: 29
End: 2019-11-18

## 2019-11-18 ENCOUNTER — OFFICE VISIT (OUTPATIENT)
Dept: FAMILY MEDICINE | Facility: CLINIC | Age: 29
End: 2019-11-18
Payer: COMMERCIAL

## 2019-11-18 VITALS
HEIGHT: 68 IN | WEIGHT: 211 LBS | SYSTOLIC BLOOD PRESSURE: 128 MMHG | BODY MASS INDEX: 31.98 KG/M2 | DIASTOLIC BLOOD PRESSURE: 70 MMHG | TEMPERATURE: 98.3 F

## 2019-11-18 DIAGNOSIS — B96.89 BV (BACTERIAL VAGINOSIS): ICD-10-CM

## 2019-11-18 DIAGNOSIS — N76.0 BV (BACTERIAL VAGINOSIS): ICD-10-CM

## 2019-11-18 DIAGNOSIS — N89.8 VAGINAL ITCHING: ICD-10-CM

## 2019-11-18 DIAGNOSIS — B37.31 CANDIDIASIS OF VAGINA: ICD-10-CM

## 2019-11-18 DIAGNOSIS — Z00.00 ROUTINE GENERAL MEDICAL EXAMINATION AT A HEALTH CARE FACILITY: Primary | ICD-10-CM

## 2019-11-18 LAB
SPECIMEN SOURCE: ABNORMAL
WET PREP SPEC: ABNORMAL

## 2019-11-18 PROCEDURE — 90686 IIV4 VACC NO PRSV 0.5 ML IM: CPT | Performed by: PHYSICIAN ASSISTANT

## 2019-11-18 PROCEDURE — 87210 SMEAR WET MOUNT SALINE/INK: CPT | Performed by: PHYSICIAN ASSISTANT

## 2019-11-18 PROCEDURE — 99213 OFFICE O/P EST LOW 20 MIN: CPT | Mod: 25 | Performed by: PHYSICIAN ASSISTANT

## 2019-11-18 PROCEDURE — 90471 IMMUNIZATION ADMIN: CPT | Performed by: PHYSICIAN ASSISTANT

## 2019-11-18 RX ORDER — METRONIDAZOLE 7.5 MG/G
1 GEL VAGINAL DAILY
Qty: 70 G | Refills: 0 | Status: SHIPPED | OUTPATIENT
Start: 2019-11-18 | End: 2019-11-23

## 2019-11-18 RX ORDER — FLUCONAZOLE 150 MG/1
150 TABLET ORAL ONCE
Qty: 1 TABLET | Refills: 0 | Status: SHIPPED | OUTPATIENT
Start: 2019-11-18 | End: 2019-11-18

## 2019-11-18 ASSESSMENT — PAIN SCALES - GENERAL: PAINLEVEL: NO PAIN (0)

## 2019-11-18 ASSESSMENT — MIFFLIN-ST. JEOR: SCORE: 1739.16

## 2019-11-18 NOTE — TELEPHONE ENCOUNTER
Please call patient --    If they just did a urinalysis (UA) it would not tell them if there was a yeast or bacterial vaginosis infection so she would still need her visit today to do a wet prep which we can do even if she is still on her period    Lea

## 2019-11-18 NOTE — PROGRESS NOTES
"Subjective     Wilma Palacios is a 29 year old female who presents to clinic today for the following health issues:    HPI   Vaginal Symptoms  Onset: Off and on since May 2019     Description:  Vaginal Discharge: none   Itching (Pruritis): YES  Burning sensation:  no   Odor: no     Accompanying Signs & Symptoms:  Pain with Urination: no   Abdominal Pain: no   Fever: no     History:   Sexually active: YES  New Partner: no   Possibility of Pregnancy:  No    Precipitating factors:   Recent Antibiotic Use: YES- 1 month ago     Alleviating factors:  None    Therapies Tried and outcome: Monistat, also Vagisil     Was seen by urology and went to [place] and had a cystoscopy due recurrent microscopic hematuria  Was told everything was okay and that it was probably her \"normal\"         BP Readings from Last 3 Encounters:   11/18/19 128/70   10/18/19 111/76   07/18/19 102/64    Wt Readings from Last 3 Encounters:   11/18/19 95.7 kg (211 lb)   10/18/19 96.4 kg (212 lb 9.6 oz)   07/01/19 96 kg (211 lb 11.2 oz)                  Reviewed and updated as needed this visit by Provider         Review of Systems   Remainder of ROS obtained and found to be negative other than that which was documented above        Objective    /70   Temp 98.3  F (36.8  C) (Tympanic)   Ht 1.725 m (5' 7.91\")   Wt 95.7 kg (211 lb)   BMI 32.17 kg/m    Body mass index is 32.17 kg/m .  Physical Exam   GENERAL: healthy, alert and no distress  RESP: lungs clear to auscultation - no rales, rhonchi or wheezes  CV: regular rates and rhythm, normal S1 S2, no S3 or S4 and no murmur, click or rub  ABDOMEN: soft, nontender and bowel sounds normal   (female): normal female external genitalia and normal urethral meatus     Diagnostic Test Results:  WEt prep: + clue cells and yeast        Assessment & Plan     (N89.8) Vaginal itching  Comment:   Plan: Wet prep            (N76.0,  B96.89) BV (bacterial vaginosis)  Comment:   Plan: metroNIDAZOLE (METROGEL) 0.75 % " vaginal gel,         Wet prep            (B37.3) Candidiasis of vagina  Comment:   Plan: fluconazole (DIFLUCAN) 150 MG tablet                 Return in about 2 weeks (around 12/2/2019) for lab only for recheck.    Lea Avaols PA-C  [facility]

## 2019-12-15 ENCOUNTER — TRANSFERRED RECORDS (OUTPATIENT)
Dept: HEALTH INFORMATION MANAGEMENT | Facility: CLINIC | Age: 29
End: 2019-12-15

## 2019-12-15 ENCOUNTER — NURSE TRIAGE (OUTPATIENT)
Dept: NURSING | Facility: CLINIC | Age: 29
End: 2019-12-15

## 2019-12-15 NOTE — TELEPHONE ENCOUNTER
Patient describes having a dry tight cough. Started last night and has continued all day. Describes chills but no thermometer to check temperature. Could be just a viral cough but with chest discomfort/tightness and because she's immunocompromised FNA advised being seen tonight.      Per patient request FNA also discussed how to protect patient's 7 month old daughter from getting this. Advised frequent hand washing, coughing into arm and calling back if daughter becomes ill.   Patient voiced understand and will follow disposition.     Nell Anderson RN  [facility] Nurse Advisor       Reason for Disposition    [1] Fever > 100.0 F (37.8 C) AND [2] diabetes mellitus or weak immune system (e.g., HIV positive, cancer chemo, splenectomy, chronic steroids)    Additional Information    Negative: Severe difficulty breathing (e.g., struggling for each breath, speaks in single words)    Negative: Bluish (or gray) lips or face now    Negative: [1] Rapid onset of cough AND [2] has hives    Negative: Coughing started suddenly after medicine, an allergic food or bee sting    Negative: [1] Difficulty breathing AND [2] exposure to flames, smoke, or fumes    Negative: [1] Stridor AND [2] difficulty breathing    Negative: Sounds like a life-threatening emergency to the triager    Negative: Choked on object of food that could be caught in the throat    Negative: Chest pain is main symptom    Negative: [1] Previous asthma attacks AND [2] this feels like asthma attack    Negative: Cough lasts > 3 weeks    Negative: Wet (productive) cough (i.e., white-yellow, yellow, green, or kesha colored sputum)    Negative: Patient sounds very sick or weak to the triager    Negative: Chest pain  (Exception: MILD central chest pain, present only when coughing)    Negative: Difficulty breathing    Negative: [1] Fever > 100.0 F (37.8 C) AND [2] bedridden (e.g., nursing home patient, CVA, chronic illness, recovering from surgery)    Negative: [1] Fever > 101 F  (38.3 C) AND [2] age > 60    Negative: Fever > 103 F (39.4 C)    Negative: [1] Coughed up blood AND [2] > 1 tablespoon (15 ml) (Exception: blood-tinged sputum)    Protocols used: COUGH - ACUTE NON-PRODUCTIVE-A-AH

## 2020-09-14 DIAGNOSIS — M45.9 ANKYLOSING SPONDYLITIS (H): Primary | ICD-10-CM

## 2020-09-14 LAB
ALT SERPL W P-5'-P-CCNC: 22 U/L (ref 0–50)
AST SERPL W P-5'-P-CCNC: 15 U/L (ref 0–45)
BASOPHILS # BLD AUTO: 0 10E9/L (ref 0–0.2)
BASOPHILS NFR BLD AUTO: 0.3 %
CREAT SERPL-MCNC: 0.75 MG/DL (ref 0.52–1.04)
CRP SERPL-MCNC: 21.6 MG/L (ref 0–8)
DIFFERENTIAL METHOD BLD: NORMAL
EOSINOPHIL # BLD AUTO: 0.3 10E9/L (ref 0–0.7)
EOSINOPHIL NFR BLD AUTO: 3.2 %
ERYTHROCYTE [DISTWIDTH] IN BLOOD BY AUTOMATED COUNT: 12.7 % (ref 10–15)
GFR SERPL CREATININE-BSD FRML MDRD: >90 ML/MIN/{1.73_M2}
HCT VFR BLD AUTO: 42.4 % (ref 35–47)
HGB BLD-MCNC: 14.1 G/DL (ref 11.7–15.7)
LYMPHOCYTES # BLD AUTO: 3 10E9/L (ref 0.8–5.3)
LYMPHOCYTES NFR BLD AUTO: 32.6 %
MCH RBC QN AUTO: 31.1 PG (ref 26.5–33)
MCHC RBC AUTO-ENTMCNC: 33.3 G/DL (ref 31.5–36.5)
MCV RBC AUTO: 94 FL (ref 78–100)
MONOCYTES # BLD AUTO: 0.7 10E9/L (ref 0–1.3)
MONOCYTES NFR BLD AUTO: 7.1 %
NEUTROPHILS # BLD AUTO: 5.2 10E9/L (ref 1.6–8.3)
NEUTROPHILS NFR BLD AUTO: 56.8 %
PLATELET # BLD AUTO: 266 10E9/L (ref 150–450)
RBC # BLD AUTO: 4.53 10E12/L (ref 3.8–5.2)
WBC # BLD AUTO: 9.1 10E9/L (ref 4–11)

## 2020-09-14 PROCEDURE — 36415 COLL VENOUS BLD VENIPUNCTURE: CPT | Performed by: INTERNAL MEDICINE

## 2020-09-14 PROCEDURE — 86200 CCP ANTIBODY: CPT | Performed by: INTERNAL MEDICINE

## 2020-09-14 PROCEDURE — 85025 COMPLETE CBC W/AUTO DIFF WBC: CPT | Performed by: INTERNAL MEDICINE

## 2020-09-14 PROCEDURE — 82565 ASSAY OF CREATININE: CPT | Performed by: INTERNAL MEDICINE

## 2020-09-14 PROCEDURE — 84450 TRANSFERASE (AST) (SGOT): CPT | Performed by: INTERNAL MEDICINE

## 2020-09-14 PROCEDURE — 86140 C-REACTIVE PROTEIN: CPT | Performed by: INTERNAL MEDICINE

## 2020-09-14 PROCEDURE — 84460 ALANINE AMINO (ALT) (SGPT): CPT | Performed by: INTERNAL MEDICINE

## 2020-09-15 LAB — CCP AB SER IA-ACNC: >340 U/ML

## 2020-11-16 ENCOUNTER — MEDICAL CORRESPONDENCE (OUTPATIENT)
Dept: HEALTH INFORMATION MANAGEMENT | Facility: CLINIC | Age: 30
End: 2020-11-16

## 2020-11-16 DIAGNOSIS — E03.9 HYPOTHYROIDISM, UNSPECIFIED TYPE: ICD-10-CM

## 2020-11-16 RX ORDER — LEVOTHYROXINE SODIUM 112 MCG
112 TABLET ORAL DAILY
Qty: 30 TABLET | Refills: 0 | Status: SHIPPED | OUTPATIENT
Start: 2020-11-16 | End: 2020-11-24

## 2020-11-16 NOTE — TELEPHONE ENCOUNTER
Sent pt a [employer] message reminding her she is overdue for an annual exam and thyroid check.    Flor Churchill RN

## 2020-11-19 ENCOUNTER — OFFICE VISIT (OUTPATIENT)
Dept: FAMILY MEDICINE | Facility: CLINIC | Age: 30
End: 2020-11-19
Payer: COMMERCIAL

## 2020-11-19 VITALS
SYSTOLIC BLOOD PRESSURE: 122 MMHG | TEMPERATURE: 98.5 F | BODY MASS INDEX: 31.37 KG/M2 | DIASTOLIC BLOOD PRESSURE: 68 MMHG | HEART RATE: 76 BPM | WEIGHT: 207 LBS | HEIGHT: 68 IN

## 2020-11-19 DIAGNOSIS — Z30.013 ENCOUNTER FOR INITIAL PRESCRIPTION OF INJECTABLE CONTRACEPTIVE: ICD-10-CM

## 2020-11-19 DIAGNOSIS — Z00.00 ROUTINE GENERAL MEDICAL EXAMINATION AT A HEALTH CARE FACILITY: Primary | ICD-10-CM

## 2020-11-19 DIAGNOSIS — M45.0 ANKYLOSING SPONDYLITIS OF MULTIPLE SITES IN SPINE (H): ICD-10-CM

## 2020-11-19 DIAGNOSIS — Z12.4 ENCOUNTER FOR SCREENING FOR CERVICAL CANCER: ICD-10-CM

## 2020-11-19 DIAGNOSIS — M06.9 RHEUMATOID ARTHRITIS, INVOLVING UNSPECIFIED SITE, UNSPECIFIED WHETHER RHEUMATOID FACTOR PRESENT (H): ICD-10-CM

## 2020-11-19 LAB
BASOPHILS # BLD AUTO: 0 10E9/L (ref 0–0.2)
BASOPHILS NFR BLD AUTO: 0.6 %
CRP SERPL-MCNC: 19.3 MG/L (ref 0–8)
DIFFERENTIAL METHOD BLD: NORMAL
EOSINOPHIL # BLD AUTO: 0.2 10E9/L (ref 0–0.7)
EOSINOPHIL NFR BLD AUTO: 2.6 %
ERYTHROCYTE [DISTWIDTH] IN BLOOD BY AUTOMATED COUNT: 13 % (ref 10–15)
ERYTHROCYTE [SEDIMENTATION RATE] IN BLOOD BY WESTERGREN METHOD: 9 MM/H (ref 0–20)
HCG UR QL: NEGATIVE
HCT VFR BLD AUTO: 42.2 % (ref 35–47)
HGB BLD-MCNC: 14.2 G/DL (ref 11.7–15.7)
LYMPHOCYTES # BLD AUTO: 2.4 10E9/L (ref 0.8–5.3)
LYMPHOCYTES NFR BLD AUTO: 37.6 %
MCH RBC QN AUTO: 31 PG (ref 26.5–33)
MCHC RBC AUTO-ENTMCNC: 33.6 G/DL (ref 31.5–36.5)
MCV RBC AUTO: 92 FL (ref 78–100)
MONOCYTES # BLD AUTO: 0.4 10E9/L (ref 0–1.3)
MONOCYTES NFR BLD AUTO: 6.2 %
NEUTROPHILS # BLD AUTO: 3.4 10E9/L (ref 1.6–8.3)
NEUTROPHILS NFR BLD AUTO: 53 %
PLATELET # BLD AUTO: 262 10E9/L (ref 150–450)
RBC # BLD AUTO: 4.58 10E12/L (ref 3.8–5.2)
T4 FREE SERPL-MCNC: 0.98 NG/DL (ref 0.76–1.46)
TSH SERPL DL<=0.005 MIU/L-ACNC: 2.82 MU/L (ref 0.4–4)
WBC # BLD AUTO: 6.5 10E9/L (ref 4–11)

## 2020-11-19 PROCEDURE — 96372 THER/PROPH/DIAG INJ SC/IM: CPT | Performed by: PHYSICIAN ASSISTANT

## 2020-11-19 PROCEDURE — 81025 URINE PREGNANCY TEST: CPT | Performed by: PHYSICIAN ASSISTANT

## 2020-11-19 PROCEDURE — 99395 PREV VISIT EST AGE 18-39: CPT | Mod: 25 | Performed by: PHYSICIAN ASSISTANT

## 2020-11-19 PROCEDURE — G0145 SCR C/V CYTO,THINLAYER,RESCR: HCPCS | Performed by: PHYSICIAN ASSISTANT

## 2020-11-19 PROCEDURE — 85025 COMPLETE CBC W/AUTO DIFF WBC: CPT | Performed by: INTERNAL MEDICINE

## 2020-11-19 PROCEDURE — 85652 RBC SED RATE AUTOMATED: CPT | Performed by: INTERNAL MEDICINE

## 2020-11-19 PROCEDURE — 36415 COLL VENOUS BLD VENIPUNCTURE: CPT | Performed by: INTERNAL MEDICINE

## 2020-11-19 PROCEDURE — 84439 ASSAY OF FREE THYROXINE: CPT | Performed by: INTERNAL MEDICINE

## 2020-11-19 PROCEDURE — 84443 ASSAY THYROID STIM HORMONE: CPT | Performed by: INTERNAL MEDICINE

## 2020-11-19 PROCEDURE — 90471 IMMUNIZATION ADMIN: CPT | Performed by: PHYSICIAN ASSISTANT

## 2020-11-19 PROCEDURE — 86140 C-REACTIVE PROTEIN: CPT | Performed by: INTERNAL MEDICINE

## 2020-11-19 PROCEDURE — 90686 IIV4 VACC NO PRSV 0.5 ML IM: CPT | Performed by: PHYSICIAN ASSISTANT

## 2020-11-19 RX ORDER — MEDROXYPROGESTERONE ACETATE 150 MG/ML
150 INJECTION, SUSPENSION INTRAMUSCULAR
Status: ACTIVE | OUTPATIENT
Start: 2020-11-19 | End: 2021-11-14

## 2020-11-19 RX ADMIN — MEDROXYPROGESTERONE ACETATE 150 MG: 150 INJECTION, SUSPENSION INTRAMUSCULAR at 12:06

## 2020-11-19 ASSESSMENT — PAIN SCALES - GENERAL: PAINLEVEL: SEVERE PAIN (7)

## 2020-11-19 ASSESSMENT — MIFFLIN-ST. JEOR: SCORE: 1716.02

## 2020-11-19 NOTE — PROGRESS NOTES
SUBJECTIVE:   CC: Wilma Palacios is an 30 year old woman who presents for preventive health visit.       Patient has been advised of split billing requirements and indicates understanding: Yes  Healthy Habits:    Do you get at least three servings of calcium containing foods daily (dairy, green leafy vegetables, etc.)? yes    Amount of exercise or daily activities, outside of work: None    Problems taking medications regularly No    Medication side effects: No    Have you had an eye exam in the past two years? yes    Do you see a dentist twice per year? yes    Do you have sleep apnea, excessive snoring or daytime drowsiness?no      -She wants to make sure she doesn't have lice. Her daughter goes to  but nothing has been confirmed.     - wanting to discuss birth control. Needs to be on something. Not really wanting to do the pill as it seemed to make her acne a lot worse. Has done depo in the past and it worked really well for her but wondering if the injection will interfere with her SI issues/ankylosing spondylitis    Today's PHQ-2 Score:   PHQ-2 (  [employer]) 2020   Q1: Little interest or pleasure in doing things 0 1   Q2: Feeling down, depressed or hopeless 0 1   PHQ-2 Score 0 2       Abuse: Current or Past(Physical, Sexual or Emotional)- No  Do you feel safe in your environment? Yes        Social History     Tobacco Use     Smoking status: Former Smoker     Packs/day: 0.00     Quit date: 2013     Years since quittin.8     Smokeless tobacco: Never Used   Substance Use Topics     Alcohol use: Yes     Comment: rarely     If you drink alcohol do you typically have >3 drinks per day or >7 drinks per week? No                     Reviewed orders with patient.  Reviewed health maintenance and updated orders accordingly - Yes  BP Readings from Last 3 Encounters:   20 122/68   19 128/70   10/18/19 111/76    Wt Readings from Last 3 Encounters:   20 93.9 kg (207 lb)  "  11/18/19 95.7 kg (211 lb)   10/18/19 96.4 kg (212 lb 9.6 oz)                    Mammogram not appropriate for this patient based on age.    Pertinent mammograms are reviewed under the imaging tab.  History of abnormal Pap smear: NO - age 21-29 PAP every 3 years recommended  PAP / HPV 11/28/2017   PAP NIL     Reviewed and updated as needed this visit by clinical staff  Tobacco  Allergies  Meds   Med Hx  Surg Hx  Fam Hx  Soc Hx        Reviewed and updated as needed this visit by Provider                    ROS:  CONSTITUTIONAL: NEGATIVE for fever, chills, change in weight  INTEGUMENTARU/SKIN: NEGATIVE for worrisome rashes, moles or lesions  EYES: NEGATIVE for vision changes or irritation  ENT: NEGATIVE for ear, mouth and throat problems  RESP: NEGATIVE for significant cough or SOB  BREAST: NEGATIVE for masses, tenderness or discharge  CV: NEGATIVE for chest pain, palpitations or peripheral edema  GI: NEGATIVE for nausea, abdominal pain, heartburn, or change in bowel habits  : NEGATIVE for unusual urinary or vaginal symptoms. Periods are regular.  MUSCULOSKELETAL: NEGATIVE for significant arthralgias or myalgia  NEURO: NEGATIVE for weakness, dizziness or paresthesias  PSYCHIATRIC: NEGATIVE for changes in mood or affect    OBJECTIVE:   /68   Pulse 76   Temp 98.5  F (36.9  C) (Tympanic)   Ht 1.725 m (5' 7.91\")   Wt 93.9 kg (207 lb)   BMI 31.56 kg/m    EXAM:  GENERAL: healthy, alert and no distress  EYES: Eyes grossly normal to inspection, PERRL and conjunctivae and sclerae normal  HENT: ear canals and TM's normal, nose and mouth without ulcers or lesions  NECK: no adenopathy, no asymmetry, masses, or scars and thyroid normal to palpation  RESP: lungs clear to auscultation - no rales, rhonchi or wheezes  BREAST: normal without masses, tenderness or nipple discharge and no palpable axillary masses or adenopathy  CV: regular rate and rhythm, normal S1 S2, no S3 or S4, no murmur, click or rub, no " peripheral edema and peripheral pulses strong  ABDOMEN: soft, nontender, no hepatosplenomegaly, no masses and bowel sounds normal   (female): normal female external genitalia, normal urethral meatus, vaginal mucosa pink, moist, well rugated, and normal cervix/adnexa/uterus without masses or discharge  MS: no gross musculoskeletal defects noted, no edema  SKIN: no suspicious lesions or rashes  NEURO: Normal strength and tone, mentation intact and speech normal  PSYCH: mentation appears normal, affect normal/bright    Diagnostic Test Results:  Labs ordered by rheumatology    ASSESSMENT/PLAN:   (Z00.00) Routine general medical examination at a health care facility  (primary encounter diagnosis)  Comment:   Plan: INFLUENZA VACCINE IM > 6 MONTHS VALENT IIV4         [47507], Pap imaged thin layer screen reflex to        HPV if ASCUS - recommend age 25 - 29            (M45.0) Ankylosing spondylitis of multiple sites in spine (H)  Comment:   Plan: followed by rheumatology. Labs ordered/released today    (M06.9) Rheumatoid arthritis, involving unspecified site, unspecified whether rheumatoid factor present (H)  Comment:   Plan: followed by rheumatology    (Z12.4) Encounter for screening for cervical cancer   Comment:   Plan: Pap imaged thin layer screen reflex to HPV if         ASCUS - recommend age 25 - 29            (Z30.013) Encounter for initial prescription of injectable contraceptive  Comment: did well/liked depo when on it previously. Would prefer that over the pill.   Plan: medroxyPROGESTERone (DEPO-PROVERA) injection         150 mg, HCG Qual, Urine ([id number])              Patient has been advised of split billing requirements and indicates understanding: Yes  COUNSELING:   Reviewed preventive health counseling, as reflected in patient instructions       Regular exercise       Healthy diet/nutrition       Contraception    Estimated body mass index is 31.56 kg/m  as calculated from the following:    Height as of this  "encounter: 1.725 m (5' 7.91\").    Weight as of this encounter: 93.9 kg (207 lb).      She reports that she quit smoking about 7 years ago. She smoked 0.00 packs per day. She has never used smokeless tobacco.      Counseling Resources:  ATP IV Guidelines  Pooled Cohorts Equation Calculator  Breast Cancer Risk Calculator  BRCA-Related Cancer Risk Assessment: FHS-7 Tool  FRAX Risk Assessment  ICSI Preventive Guidelines  Dietary Guidelines for Americans, 2010  USDA's MyPlate  ASA Prophylaxis  Lung CA Screening    Lea Avalos PA-C  [facility]  "

## 2020-11-19 NOTE — NURSING NOTE
Clinic Administered Medication Documentation      Depo Provera Documentation    URINE HCG: negative    Depo-Provera Standing Order inclusion/exclusion criteria reviewed.   Patient meets: inclusion criteria     BP: 122/68  LAST PAP/EXAM:   Lab Results   Component Value Date    PAP NIL 11/28/2017       Prior to injection, verified patient identity using patient's name and date of birth. Medication was administered. Please see MAR and medication order for additional information.     Was entire vial of medication used? Yes  Vial/Syringe: Single dose vial  Expiration Date:  06/2022     Patient instructed to remain in clinic for 15 minutes and report any adverse reaction to staff immediately .  NEXT INJECTION DUE: 2/4/21 - 2/18/21     Injection given in Right Glute.    Nolan Chávez, CMA

## 2020-11-24 LAB
COPATH REPORT: NORMAL
PAP: NORMAL

## 2020-12-04 ENCOUNTER — PATIENT OUTREACH (OUTPATIENT)
Dept: FAMILY MEDICINE | Facility: CLINIC | Age: 30
End: 2020-12-04

## 2020-12-04 NOTE — TELEPHONE ENCOUNTER
2009 NIL pap  2013 NIL pap.  2014 NIL pap.  2017 NIL pap  11/19/20 NIL pap. Plan cotest in 1 year due bef 11/19/21.

## 2021-01-14 ENCOUNTER — TELEPHONE (OUTPATIENT)
Dept: FAMILY MEDICINE | Facility: CLINIC | Age: 31
End: 2021-01-14

## 2021-01-14 NOTE — TELEPHONE ENCOUNTER
Patient called back, wanting to  Know if she should initiate eveisit.  Mucous/sinus symptoms for >2 weeks.  Advised evisit to address concerns.  Kenya Blas RN

## 2021-01-14 NOTE — TELEPHONE ENCOUNTER
Reason for call:  Patient reporting a symptom    Symptom or request: Wilma has been having a dry throat in the a.m. and then p.m. along with a lot of mucus drainage.  No other sx.  Thinking she might have a sinus infectioon?  Please call and assess. Thank you..Caitlin Quinn    Duration (how long have symptoms been present): about 2 weeks    Have you been treated for this before? unknown    Additional comments: [employer]    Phone Number patient can be reached at:  Home number on file [phone] (home)    Best Time:  Any time    Can we leave a detailed message on this number:  YES    Call taken on 1/14/2021 at 7:50 AM by Caitlin Quinn

## 2021-01-15 ENCOUNTER — VIRTUAL VISIT (OUTPATIENT)
Dept: FAMILY MEDICINE | Facility: CLINIC | Age: 31
End: 2021-01-15
Payer: COMMERCIAL

## 2021-01-15 DIAGNOSIS — R09.82 PND (POST-NASAL DRIP): Primary | ICD-10-CM

## 2021-01-15 DIAGNOSIS — J02.9 SORE THROAT: ICD-10-CM

## 2021-01-15 PROCEDURE — 99213 OFFICE O/P EST LOW 20 MIN: CPT | Mod: 95 | Performed by: PHYSICIAN ASSISTANT

## 2021-01-15 RX ORDER — FLUTICASONE PROPIONATE 50 MCG
1 SPRAY, SUSPENSION (ML) NASAL
COMMUNITY
Start: 2021-01-15 | End: 2023-05-05

## 2021-01-15 NOTE — PROGRESS NOTES
Wilma is a 30 year old who is being evaluated via a billable telephone visit.      What phone number would you like to be contacted at? [phone]  How would you like to obtain your AVS? MyChart  Assessment & Plan       ICD-10-CM    1. PND (post-nasal drip)  R09.82 fluticasone (FLONASE) 50 MCG/ACT nasal spray   2. Sore throat  J02.9 fluticasone (FLONASE) 50 MCG/ACT nasal spray     Suspect PND vs infectious cause of sore throat given descriptions of symptoms  Trial of 2 weeks of flonase  Follow up if not getting better      Return in about 2 weeks (around 1/29/2021) for If not improving or worsening.    COSTA Palmer [facility]     Wilma is a 30 year old who presents to clinic today for the following health issues    HPI       Acute Illness  Acute illness concerns: Dry throat, drainage   Onset/Duration: 2 weeks  Symptoms:  Fever: no  Chills/Sweats: no  Headache (location?): no  Sinus Pressure: no  Conjunctivitis:  no  Ear Pain: no  Rhinorrhea: no  Congestion: no  Sore Throat: no, not sore anymore but very dry. Having a lot of post nasal drip and drainage into her throat.  Cough: no  Wheeze: no  Decreased Appetite: no  Nausea: no  Vomiting: no  Diarrhea: no  Dysuria/Freq.: no  Dysuria or Hematuria: no  Fatigue/Achiness: no  Sick/Strep Exposure: no  Therapies tried and outcome: None    Started out as a really bad sore throat a couple weeks ago  Figured it was going to turn into a typical cold and then resolve but never developed the cold symptoms and now has an ongoing dry throat with really thick mucous  Worse in the morning, better as the day goes on    Not using flonase - has not used for quite awhile    Review of Systems   Remainder of ROS obtained and found to be negative other than that which was documented above        Objective           Vitals:  No vitals were obtained today due to virtual visit.    Physical Exam   healthy, alert and no distress  PSYCH: Alert and  oriented times 3; coherent speech, normal   rate and volume, able to articulate logical thoughts, able   to abstract reason, no tangential thoughts, no hallucinations   or delusions  Her affect is normal  RESP: No cough, no audible wheezing, able to talk in full sentences  Remainder of exam unable to be completed due to telephone visits      Phone call duration: 7 minutes

## 2021-02-15 ENCOUNTER — ALLIED HEALTH/NURSE VISIT (OUTPATIENT)
Dept: FAMILY MEDICINE | Facility: CLINIC | Age: 31
End: 2021-02-15
Payer: COMMERCIAL

## 2021-02-15 VITALS — DIASTOLIC BLOOD PRESSURE: 62 MMHG | SYSTOLIC BLOOD PRESSURE: 112 MMHG

## 2021-02-15 DIAGNOSIS — Z30.42 ENCOUNTER FOR SURVEILLANCE OF INJECTABLE CONTRACEPTIVE: Primary | ICD-10-CM

## 2021-02-15 PROCEDURE — 99207 PR NO CHARGE NURSE ONLY: CPT

## 2021-02-15 NOTE — PROGRESS NOTES
Clinic Administered Medication Documentation      Depo Provera Documentation    URINE HCG: not indicated    Depo-Provera Standing Order inclusion/exclusion criteria reviewed.   Patient meets: inclusion criteria     BP: 112/62  LAST PAP/EXAM:   Lab Results   Component Value Date    PAP NIL 11/19/2020       Prior to injection, verified patient identity using patient's name and date of birth. Medication was administered. Please see MAR and medication order for additional information.     Was entire vial of medication used? Yes  Vial/Syringe: Single dose vial  Expiration Date:  8/22    Patient instructed to remain in clinic for 15 minutes.  NEXT INJECTION DUE: 5/3/21 - 5/17/21

## 2021-02-23 ENCOUNTER — MEDICAL CORRESPONDENCE (OUTPATIENT)
Dept: HEALTH INFORMATION MANAGEMENT | Facility: CLINIC | Age: 31
End: 2021-02-23

## 2021-02-23 DIAGNOSIS — M45.9 ANKYLOSING SPONDYLITIS (H): Primary | ICD-10-CM

## 2021-04-05 DIAGNOSIS — M45.9 ANKYLOSING SPONDYLITIS (H): ICD-10-CM

## 2021-04-05 LAB
AST SERPL W P-5'-P-CCNC: 16 U/L (ref 0–45)
BASOPHILS # BLD AUTO: 0 10E9/L (ref 0–0.2)
BASOPHILS NFR BLD AUTO: 0.5 %
CREAT SERPL-MCNC: 0.96 MG/DL (ref 0.52–1.04)
CRP SERPL-MCNC: <2.9 MG/L (ref 0–8)
DIFFERENTIAL METHOD BLD: NORMAL
EOSINOPHIL # BLD AUTO: 0.2 10E9/L (ref 0–0.7)
EOSINOPHIL NFR BLD AUTO: 2.5 %
ERYTHROCYTE [DISTWIDTH] IN BLOOD BY AUTOMATED COUNT: 13.1 % (ref 10–15)
GFR SERPL CREATININE-BSD FRML MDRD: 80 ML/MIN/{1.73_M2}
HCT VFR BLD AUTO: 40.5 % (ref 35–47)
HGB BLD-MCNC: 13.8 G/DL (ref 11.7–15.7)
LYMPHOCYTES # BLD AUTO: 2.4 10E9/L (ref 0.8–5.3)
LYMPHOCYTES NFR BLD AUTO: 37 %
MCH RBC QN AUTO: 31.2 PG (ref 26.5–33)
MCHC RBC AUTO-ENTMCNC: 34.1 G/DL (ref 31.5–36.5)
MCV RBC AUTO: 92 FL (ref 78–100)
MONOCYTES # BLD AUTO: 0.5 10E9/L (ref 0–1.3)
MONOCYTES NFR BLD AUTO: 7.7 %
NEUTROPHILS # BLD AUTO: 3.3 10E9/L (ref 1.6–8.3)
NEUTROPHILS NFR BLD AUTO: 52.3 %
PLATELET # BLD AUTO: 251 10E9/L (ref 150–450)
RBC # BLD AUTO: 4.42 10E12/L (ref 3.8–5.2)
WBC # BLD AUTO: 6.4 10E9/L (ref 4–11)

## 2021-04-05 PROCEDURE — 86140 C-REACTIVE PROTEIN: CPT | Performed by: INTERNAL MEDICINE

## 2021-04-05 PROCEDURE — 36415 COLL VENOUS BLD VENIPUNCTURE: CPT | Performed by: INTERNAL MEDICINE

## 2021-04-05 PROCEDURE — 84450 TRANSFERASE (AST) (SGOT): CPT | Performed by: INTERNAL MEDICINE

## 2021-04-05 PROCEDURE — 85025 COMPLETE CBC W/AUTO DIFF WBC: CPT | Performed by: INTERNAL MEDICINE

## 2021-04-05 PROCEDURE — 82565 ASSAY OF CREATININE: CPT | Performed by: INTERNAL MEDICINE

## 2021-05-02 ENCOUNTER — TELEPHONE (OUTPATIENT)
Dept: EMERGENCY MEDICINE | Facility: CLINIC | Age: 31
End: 2021-05-02

## 2021-05-02 ENCOUNTER — HOSPITAL ENCOUNTER (EMERGENCY)
Facility: CLINIC | Age: 31
Discharge: HOME | End: 2021-05-02
Attending: PHYSICIAN ASSISTANT | Admitting: PHYSICIAN ASSISTANT
Payer: COMMERCIAL

## 2021-05-02 VITALS
RESPIRATION RATE: 14 BRPM | WEIGHT: 200 LBS | DIASTOLIC BLOOD PRESSURE: 83 MMHG | BODY MASS INDEX: 30.49 KG/M2 | HEART RATE: 100 BPM | SYSTOLIC BLOOD PRESSURE: 115 MMHG | OXYGEN SATURATION: 98 % | TEMPERATURE: 98.9 F

## 2021-05-02 DIAGNOSIS — U07.1 INFECTION DUE TO 2019 NOVEL CORONAVIRUS: ICD-10-CM

## 2021-05-02 DIAGNOSIS — Z20.822 ENCOUNTER FOR LABORATORY TESTING FOR COVID-19 VIRUS: ICD-10-CM

## 2021-05-02 LAB
LABORATORY COMMENT REPORT: ABNORMAL
SARS-COV-2 RNA RESP QL NAA+PROBE: POSITIVE
SPECIMEN SOURCE: ABNORMAL

## 2021-05-02 PROCEDURE — 87635 SARS-COV-2 COVID-19 AMP PRB: CPT | Performed by: PHYSICIAN ASSISTANT

## 2021-05-02 PROCEDURE — 99213 OFFICE O/P EST LOW 20 MIN: CPT | Performed by: PHYSICIAN ASSISTANT

## 2021-05-02 PROCEDURE — C9803 HOPD COVID-19 SPEC COLLECT: HCPCS | Performed by: PHYSICIAN ASSISTANT

## 2021-05-02 PROCEDURE — G0463 HOSPITAL OUTPT CLINIC VISIT: HCPCS | Performed by: PHYSICIAN ASSISTANT

## 2021-05-02 ASSESSMENT — ENCOUNTER SYMPTOMS
RESPIRATORY NEGATIVE: 1
FEVER: 0
SHORTNESS OF BREATH: 0
CONSTITUTIONAL NEGATIVE: 1

## 2021-05-02 NOTE — ED NOTES
Per verbal order form Bety Dewitt PA-C patient notified of positive covid results.. Conrado Ng LPN on 5/2/2021 at 4:41 PM     no

## 2021-05-02 NOTE — ED PROVIDER NOTES
History     Chief Complaint   Patient presents with     Covid 19 Testing     rule out covid, daugther attends      HPI  Wilma Palacios is a 31 year old female who presents requesting COVID-19 testing.  Mother states she needs to test due to daughters  requirements.  Mother states that she has ongoing congestion which she attributes to seasonal allergies for which she takes antihistamines.  Denies fevers, chills, cough, rash, sore throat, sinus pressure, chest pain, or shortness of breath.  No known ill contacts with anyone with COVID-19.  Patient does have history of RA and is immunosuppressed.      Allergies:  Allergies   Allergen Reactions     Ascorbate Anaphylaxis     All Fruits.     Nuts Swelling and Nausea and Vomiting     Sulfa Drugs        Problem List:    Patient Active Problem List    Diagnosis Date Noted     Cervical cancer screening 11/25/2020     Priority: Medium     2009 NIL pap  2013 NIL pap.  2014 NIL pap.  2017 NIL pap  11/19/20 NIL pap. Plan cotest in 1 year due bef 11/19/21.  12/4/20 Pt sent result note via [employer].   12/15/20 Letter sent.       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  Does not think there are any contraindications for vaginal birth.  Follow up with them 32-34 wks.  Pneumonia vaccine in 2nd or 3rd trimester.  Also follow up 6-8 weeks after delivery, sooner as needed       Anxiety 12/12/2017     Priority: Medium     Allergic rhinitis 03/18/2014     Priority: Medium     Hypothyroidism 09/12/2013     Priority: Medium        Past Medical History:    Past Medical  History:   Diagnosis Date     Ankylosing spondylitis with multisystem involvement (H) 2012     Thyroid disease 2008       Past Surgical History:    Past Surgical History:   Procedure Laterality Date      SECTION  2019     COLONOSCOPY  2015     EXTRACTION(S) DENTAL  2009       Family History:    Family History   Problem Relation Age of Onset     Skin Cancer Maternal Grandfather      Rheumatoid Arthritis Maternal Grandfather      Heart Disease Paternal Grandmother        Social History:  Marital Status:   [2]  Social History     Tobacco Use     Smoking status: Former Smoker     Packs/day: 0.00     Quit date: 2013     Years since quittin.3     Smokeless tobacco: Never Used   Substance Use Topics     Alcohol use: Yes     Comment: rarely     Drug use: No        Medications:    albuterol (PROAIR HFA/PROVENTIL HFA/VENTOLIN HFA) 108 (90 Base) MCG/ACT inhaler  budesonide (RINOCORT AQUA) 32 MCG/ACT nasal spray  fluticasone (FLONASE) 50 MCG/ACT nasal spray  HUMIRA PEN 40 MG/0.8ML pen kit  loratadine (CLARITIN) 10 MG tablet  SYNTHROID 112 MCG tablet          Review of Systems   Constitutional: Negative.  Negative for fever.   HENT: Positive for congestion.    Respiratory: Negative.  Negative for shortness of breath.    Skin: Negative.    All other systems reviewed and are negative.      Physical Exam   BP: 115/83  Pulse: 100  Temp: 98.9  F (37.2  C)  Resp: 14  Weight: 90.7 kg (200 lb)  SpO2: 98 %      Physical Exam  Constitutional:       General: She is not in acute distress.     Appearance: Normal appearance. She is well-developed. She is not ill-appearing, toxic-appearing or diaphoretic.   HENT:      Head: Normocephalic and atraumatic.      Right Ear: Tympanic membrane, ear canal and external ear normal.      Left Ear: Tympanic membrane, ear canal and external ear normal.      Nose: Mucosal edema and congestion present. No rhinorrhea.      Mouth/Throat:      Lips: Pink.      Mouth: Mucous membranes are  moist.      Pharynx: Oropharynx is clear. Uvula midline. No pharyngeal swelling, oropharyngeal exudate, posterior oropharyngeal erythema or uvula swelling.      Tonsils: No tonsillar exudate or tonsillar abscesses.   Eyes:      Conjunctiva/sclera: Conjunctivae normal.      Pupils: Pupils are equal, round, and reactive to light.   Neck:      Musculoskeletal: Normal range of motion and neck supple. No neck rigidity.   Cardiovascular:      Rate and Rhythm: Normal rate and regular rhythm.      Heart sounds: Normal heart sounds.   Pulmonary:      Effort: Pulmonary effort is normal. No respiratory distress.      Breath sounds: Normal breath sounds. No stridor. No wheezing, rhonchi or rales.   Musculoskeletal: Normal range of motion.   Lymphadenopathy:      Cervical: No cervical adenopathy.   Skin:     General: Skin is warm and dry.      Findings: No rash.   Neurological:      Mental Status: She is alert and oriented to person, place, and time.   Psychiatric:         Behavior: Behavior is cooperative.         ED Course        Procedures    Results for orders placed or performed during the hospital encounter of 05/02/21 (from the past 24 hour(s))   Asymptomatic SARS-CoV-2 COVID-19 Virus (Coronavirus) by PCR    Specimen: Nasopharyngeal   Result Value Ref Range    SARS-CoV-2 Virus Specimen Source Nasopharyngeal     SARS-CoV-2 PCR Result POSITIVE (AA)     SARS-CoV-2 PCR Comment (Note)        Medications - No data to display    Assessments & Plan (with Medical Decision Making)     Pt is a 31 year old female who presents requesting COVID-19 testing.  Mother states she needs to test due to daughters  requirements.  Mother states that she has ongoing congestion which she attributes to seasonal allergies for which she takes antihistamines.  No known ill contacts with anyone with COVID-19.  Patient does have history of RA and is immunosuppressed.    Pt is afebrile on arrival.  Exam as above.  Patient is not hypoxic.  Lungs  are clear to auscultation bilaterally.  COVID-19 testing was positive.  Discussed results with patient.  Encouraged symptomatic treatments at home.  Get well loop referral was placed.  Return precautions were reviewed.  Hand-outs were provided.    Patient was instructed to follow-up with PCP virtually in 3-5 days for continued care and management or sooner if new or worsening symptoms.  She is to return to the ED for persistent and/or worsening symptoms.  Patient expressed understanding of the diagnosis and plan and was discharged home in good condition.    I have reviewed the nursing notes.    I have reviewed the findings, diagnosis, plan and need for follow up with the patient.    Discharge Medication List as of 5/2/2021  4:34 PM          Final diagnoses:   Encounter for laboratory testing for COVID-19 virus   Infection due to 2019 novel coronavirus       5/2/2021   [facility] EMERGENCY DEPT      Disclaimer:  This note consists of symbols derived from keyboarding, dictation and/or voice recognition software.  As a result, there may be errors in the script that have gone undetected.  Please consider this when interpreting information found in this chart.     Bety Dewitt PA-C  05/02/21 2058

## 2021-05-03 ENCOUNTER — VIRTUAL VISIT (OUTPATIENT)
Dept: FAMILY MEDICINE | Facility: CLINIC | Age: 31
End: 2021-05-03
Payer: COMMERCIAL

## 2021-05-03 ENCOUNTER — PATIENT OUTREACH (OUTPATIENT)
Dept: CARE COORDINATION | Facility: CLINIC | Age: 31
End: 2021-05-03

## 2021-05-03 DIAGNOSIS — U07.1 2019 NOVEL CORONAVIRUS DISEASE (COVID-19): Primary | ICD-10-CM

## 2021-05-03 DIAGNOSIS — M45.0 ANKYLOSING SPONDYLITIS OF MULTIPLE SITES IN SPINE (H): ICD-10-CM

## 2021-05-03 DIAGNOSIS — U07.1 CLINICAL DIAGNOSIS OF COVID-19: Primary | ICD-10-CM

## 2021-05-03 PROCEDURE — 99213 OFFICE O/P EST LOW 20 MIN: CPT | Mod: 95 | Performed by: PHYSICIAN ASSISTANT

## 2021-05-03 NOTE — PROGRESS NOTES
Clinic Care Coordination Contact  Community Health Worker Initial Outreach     Patient was in the ED 5/2/2021 for COVID Symptoms. Patient doesn't have an established provider within . Today, she followed up with a [facility] [place] virtual and discussed COVID questions regarding quarantine. No outreach needed by  [facility] at this time.

## 2021-05-03 NOTE — TELEPHONE ENCOUNTER
Coronavirus (COVID-19) Notification    Reason for call  Notify of POSITIVE  COVID-19 lab result, assess symptoms,  review [facility] recommendations    Lab Result   Lab test for 2019-nCoV rRt-PCR or SARS-COV-2 PCR  Oropharyngeal AND/OR nasopharyngeal swabs were POSITIVE for 2019-nCoV RNA [OR] SARS-COV-2 RNA (COVID-19) RNA     We have been unable to reach Patient by phone at this time to notify of their Positive COVID-19 result.  Left voicemail message requesting a call back to [phone] [facility] for results.        POSITIVE COVID-19 Letter sent.    Janey Cerda LPN

## 2021-05-03 NOTE — PROGRESS NOTES
"Wilma is a 31 year old who is being evaluated via a billable telephone visit.      What phone number would you like to be contacted at? [phone]  How would you like to obtain your AVS? [email or web address]    Assessment & Plan       (U07.1) Clinical diagnosis of COVID-19  (primary encounter diagnosis)  Comment: confirmed positive on 5/2. Reviewed quarantine policies for both her and her  as well as her daughter (who tested negative)  Plan: She is otherwise feeling okay. Discussed length of quarantine and that retesting is not done given the test will remain positive for some time. Daughter needs to quarantine (no ) and will need to restest at end of quarantine before going back to     (M45.0) Ankylosing spondylitis of multiple sites in spine (H)  Comment:   Plan: followed closely at [facility]. Stable         BMI:   Estimated body mass index is 30.49 kg/m  as calculated from the following:    Height as of 11/19/20: 1.725 m (5' 7.91\").    Weight as of 5/2/21: 90.7 kg (200 lb).       No follow-ups on file.    Lea Avalos PA-C  [facility]    Ally Dillon is a 31 year old who presents for the following health issues     HPI       Patient and her  tested positive for Covid yesterday 5/2. She has questions about how long to quarantine. Patient has been having symptoms \"for weeks\" before even getting tested. She said they were more allergy like symptoms.     She and her  have been \"sick\" on/off for a few weeks but started to really feel sick on Thursday. He developed a fever on Friday   tested positive on Saturday (5/1)  She tested positive       Review of Systems   Remainder of ROS obtained and found to be negative other than that which was documented above        Objective           Vitals:  No vitals were obtained today due to virtual visit.    Physical Exam   healthy, alert and no distress  PSYCH: Alert and oriented times 3; coherent speech, normal   rate and " volume, able to articulate logical thoughts, able   to abstract reason, no tangential thoughts, no hallucinations   or delusions  Her affect is normal  RESP: No cough, no audible wheezing, able to talk in full sentences  Remainder of exam unable to be completed due to telephone visits        Phone call duration: 25 minutes

## 2021-05-05 NOTE — TELEPHONE ENCOUNTER
"Coronavirus (COVID-19) Notification    Caller Name (Patient, parent, daughter/son, grandparent, etc)  patient    Reason for call  Notify of Positive Coronavirus (COVID-19) lab results, assess symptoms,  review [facility] recommendations    Lab Result    Lab test:  2019-nCoV rRt-PCR or SARS-CoV-2 PCR    Oropharyngeal AND/OR nasopharyngeal swabs is POSITIVE for 2019-nCoV RNA/SARS-COV-2 PCR (COVID-19 virus)    RN Recommendations/Instructions per [facility] Coronavirus COVID-19 recommendations    Brief introduction script  Introduce self then review script:  \"I am calling on behalf of [employer].  We were notified that your Coronavirus test (COVID-19) for was POSITIVE for the virus.     Patient refuses education @ this time.    A Positive COVID-19 letter will be sent via [email or web address] or the mail. (Exception, no letters sent to [facility]urgerical/Preprocedure Patients)    Savannah Schneider LPN    "

## 2021-09-09 ENCOUNTER — OFFICE VISIT (OUTPATIENT)
Dept: FAMILY MEDICINE | Facility: CLINIC | Age: 31
End: 2021-09-09
Payer: COMMERCIAL

## 2021-09-09 VITALS
OXYGEN SATURATION: 98 % | HEART RATE: 70 BPM | SYSTOLIC BLOOD PRESSURE: 106 MMHG | WEIGHT: 214.3 LBS | DIASTOLIC BLOOD PRESSURE: 74 MMHG | TEMPERATURE: 98.2 F | BODY MASS INDEX: 32.67 KG/M2

## 2021-09-09 DIAGNOSIS — N92.6 MISSED PERIOD: ICD-10-CM

## 2021-09-09 DIAGNOSIS — N76.0 BV (BACTERIAL VAGINOSIS): ICD-10-CM

## 2021-09-09 DIAGNOSIS — Z12.4 CERVICAL CANCER SCREENING: Primary | ICD-10-CM

## 2021-09-09 DIAGNOSIS — E03.9 HYPOTHYROIDISM, UNSPECIFIED TYPE: ICD-10-CM

## 2021-09-09 DIAGNOSIS — N91.2 AMENORRHEA: ICD-10-CM

## 2021-09-09 DIAGNOSIS — B96.89 BV (BACTERIAL VAGINOSIS): ICD-10-CM

## 2021-09-09 LAB
CLUE CELLS: PRESENT
HCG UR QL: NEGATIVE
T4 FREE SERPL-MCNC: 0.8 NG/DL (ref 0.76–1.46)
TRICHOMONAS, WET PREP: ABNORMAL
TSH SERPL DL<=0.005 MIU/L-ACNC: 5.37 MU/L (ref 0.4–4)
WBC'S/HIGH POWER FIELD, WET PREP: ABNORMAL
YEAST, WET PREP: ABNORMAL

## 2021-09-09 PROCEDURE — 36415 COLL VENOUS BLD VENIPUNCTURE: CPT | Performed by: NURSE PRACTITIONER

## 2021-09-09 PROCEDURE — 87210 SMEAR WET MOUNT SALINE/INK: CPT | Performed by: NURSE PRACTITIONER

## 2021-09-09 PROCEDURE — 81025 URINE PREGNANCY TEST: CPT | Performed by: NURSE PRACTITIONER

## 2021-09-09 PROCEDURE — 99214 OFFICE O/P EST MOD 30 MIN: CPT | Performed by: NURSE PRACTITIONER

## 2021-09-09 PROCEDURE — 84443 ASSAY THYROID STIM HORMONE: CPT | Performed by: NURSE PRACTITIONER

## 2021-09-09 PROCEDURE — 87624 HPV HI-RISK TYP POOLED RSLT: CPT | Performed by: NURSE PRACTITIONER

## 2021-09-09 PROCEDURE — G0145 SCR C/V CYTO,THINLAYER,RESCR: HCPCS | Performed by: NURSE PRACTITIONER

## 2021-09-09 PROCEDURE — 84439 ASSAY OF FREE THYROXINE: CPT | Performed by: NURSE PRACTITIONER

## 2021-09-09 RX ORDER — METRONIDAZOLE 500 MG/1
500 TABLET ORAL 2 TIMES DAILY
Qty: 14 TABLET | Refills: 0 | Status: SHIPPED | OUTPATIENT
Start: 2021-09-09 | End: 2021-09-16

## 2021-09-09 RX ORDER — METRONIDAZOLE 7.5 MG/G
1 GEL VAGINAL DAILY
Qty: 25 G | Refills: 0 | Status: SHIPPED | OUTPATIENT
Start: 2021-09-09 | End: 2021-09-14

## 2021-09-09 RX ORDER — ETANERCEPT 50 MG/ML
50 SOLUTION SUBCUTANEOUS WEEKLY
COMMUNITY
Start: 2021-08-31

## 2021-09-09 NOTE — PROGRESS NOTES
Assessment & Plan     Cervical cancer screening    - Pap screen reflex to HPV if ASCUS - recommend age 25 - 29    BV (bacterial vaginosis)    - Wet prep - Clinic Collect  - metroNIDAZOLE (FLAGYL) 500 MG tablet; Take 1 tablet (500 mg) by mouth 2 times daily for 7 days  - metroNIDAZOLE (METROGEL) 0.75 % vaginal gel; Place 1 applicator (5 g) vaginally daily for 5 days    Missed period  -did not have period since February after she stopped Depo, pregnancy test negative, recommended follow up with OB GYN   - HCG Qual, Urine ([id number]); Future  - HCG Qual, Urine ([id number])    Hypothyroidism, unspecified type  -clinically euthyroid, Wilma is not taking Synthroid daily as prescribed, states on average was taking 3-4 tablets per week over the last 4 weeks, recommended to take Synthroid as prescribed and recheck thyroid levels again in 4-6 weeks   - TSH with free T4 reflex; Future  - TSH with free T4 reflex  - T4 free  - TSH with free T4 reflex; Future    See Patient Instructions    Return in about 4 weeks (around 10/7/2021) for Lab Work.    SALIMA Pichardo [facility]    Ally Dillon is a 31 year old who presents for the following health issues     HPI     Vaginal Symptoms  Onset/Duration: 2 days   Description:  Vaginal Discharge: none   Itching (Pruritis): YES  Burning sensation:  no  Odor: no  Accompanying Signs & Symptoms:  Urinary symptoms: no  Abdominal pain: no  Fever: no  History:   Sexually active: YES  New Partner: no  Possibility of Pregnancy:  YES   Recent antibiotic use: no  Previous vaginitis issues: YES  Precipitating or alleviating factors: None  Therapies tried and outcome: vagisil- not helpful           Review of Systems   Constitutional, HEENT, cardiovascular, pulmonary, gi and gu systems are negative, except as otherwise noted.      Objective    /74 (BP Location: Right arm, Patient Position: Sitting, Cuff Size: Adult Large)   Pulse 70   Temp 98.2  F (36.8   C) (Tympanic)   Wt 97.2 kg (214 lb 4.8 oz)   LMP  (LMP Unknown)   SpO2 98%   BMI 32.67 kg/m    Body mass index is 32.67 kg/m .  Physical Exam   GENERAL: healthy, alert and no distress  ABDOMEN: soft, nontender   (female): normal female external genitalia, normal urethral meatus , vaginal mucosa pink, moist, well rugated, vaginal discharge - moderate and malodorous and normal cervix, adnexae, and uterus without masses.  SKIN: no suspicious lesions or rashes  NEURO: Normal strength and tone, mentation intact and speech normal  PSYCH: mentation appears normal, affect normal/bright    Results for orders placed or performed in visit on 09/09/21 (from the past 24 hour(s))   Wet prep - Clinic Collect    Specimen: Vagina; Swab   Result Value Ref Range    Trichomonas Absent Absent    Yeast Absent Absent    Clue Cells Present (A) Absent    WBCs/high power field 2+ (A) None   HCG Qual, Urine ([id number])   Result Value Ref Range    hCG Urine Qualitative Negative Negative   TSH with free T4 reflex   Result Value Ref Range    TSH 5.37 (H) 0.40 - 4.00 mU/L   T4 free   Result Value Ref Range    Free T4 0.80 0.76 - 1.46 ng/dL

## 2021-09-10 ENCOUNTER — NURSE TRIAGE (OUTPATIENT)
Dept: NURSING | Facility: CLINIC | Age: 31
End: 2021-09-10

## 2021-09-10 NOTE — TELEPHONE ENCOUNTER
"Patient believes that she is having a reaction to the medication Flagyl. Abdominal pain, facial flushing, nausea, and diarrhea. Patient states that she had alcohol with lunch and then had flagyl at 6:15 pm. Patient reports abdominal pain that is constant as in \"my stomach hurts because I'm so nauseated but not like pain but it is constant.\"  Patient panicking wondering if she is going to be okay and if she is safe to stay at home. Asked if she could take some left over Zofran ODT for the nausea. [employer] states serious interaction between Flagyl and Zofran. Patient denies already taking      Dr. Fritz Moreno returning page. Provider states that patient will most likely feel pretty terrible for the next little while but should be fine to stay home. The patient can be seen in the ED if she chooses, that is her right. Provider is advising patient to decrease the Flagyl oral to 250 mg twice a day for 7 days and to take it with food.     Call to patient. Relayed message from Dr. Moreno. Patient verbalized understanding to cut the 500 mg tablet in half for 250 mg dosing twice a day for 7 days with food and to abstain from alcohol while taking the medication and 3 days afterwards.   Valencia Gomez RN   09/10/21 12:52 AM  [facility] Nurse Advisor    Reason for Disposition    [1] Caller has URGENT medication question about med that PCP or specialist prescribed AND [2] triager unable to answer question    Additional Information    Negative: Shock suspected (e.g., cold/pale/clammy skin, too weak to stand, low BP, rapid pulse)    Negative: Sounds like a life-threatening emergency to the triager    Negative: [1] Nausea or vomiting AND [2] pregnancy < 20 weeks    Negative: Menstrual Period - Missed or Late (i.e., pregnancy suspected)    Negative: Heat exhaustion suspected (i.e., dehydration from heat exposure)    Negative: Motion sickness suspected (i.e., nausea with car, plane, boat, or train travel)    Negative: " Anxiety or stress suspected (i.e., nausea with anxiety attacks or stressful situations)    Negative: Traumatic Brain Injury (TBI) suspected    Negative: Nausea (or Vomiting) in a cancer patient who is currently (or recently) receiving chemotherapy or radiation therapy, or cancer patient who has metastatic or end-stage cancer and is receiving palliative care    Negative: Vomiting occurs    Negative: Other symptom is present, see that guideline.  (e.g., chest pain, headache, dizziness, abdominal pain, colds, sore throat, etc.).    Negative: Unable to walk, or can only walk with assistance (e.g., requires support)    Negative: Difficulty breathing    Negative: [1] Insulin-dependent diabetes (Type I) AND [2] glucose > 400 mg/dl (22 mmol/l)    Negative: [1] Drinking very little AND [2] dehydration suspected (e.g., no urine > 12 hours, very dry mouth, very lightheaded)    Negative: Patient sounds very sick or weak to the triager    Negative: Fever > 104 F (40 C)    Negative: [1] Fever > 101 F (38.3 C) AND [2] age > 60    Negative: [1] Fever > 100.0 F (37.8 C) AND [2] bedridden (e.g., nursing home patient, CVA, chronic illness, recovering from surgery)    Negative: [1] Fever > 100.0 F (37.8 C) AND [2] diabetes mellitus or weak immune system (e.g., HIV positive, cancer chemo, splenectomy, organ transplant, chronic steroids)    Negative: Taking any of the following medications: digoxin (Lanoxin), lithium, theophylline, phenytoin (Dilantin)    Negative: Drug overdose and triager unable to answer question    Negative: Caller requesting information unrelated to medicine    Negative: Caller requesting a prescription for Strep throat and has a positive culture result    Negative: Rash while taking a medication or within 3 days of stopping it    Negative: Immunization reaction suspected    Negative: [1] Asthma and [2] having symptoms of asthma (cough, wheezing, etc.)    Negative: [1] Influenza symptoms AND [2] anti-viral med  "prescription request, such as Tamiflu    Negative: [1] Symptom of illness (e.g., headache, abdominal pain, earache, vomiting) AND [2] more than mild    Negative: MORE THAN A DOUBLE DOSE of a prescription or over-the-counter (OTC) drug    Negative: [1] DOUBLE DOSE (an extra dose or lesser amount) of over-the-counter (OTC) drug AND [2] any symptoms (e.g., dizziness, nausea, pain, sleepiness)    Negative: [1] DOUBLE DOSE (an extra dose or lesser amount) of prescription drug AND [2] any symptoms (e.g., dizziness, nausea, pain, sleepiness)    Negative: Took another person's prescription drug    Negative: [1] DOUBLE DOSE (an extra dose or lesser amount) of prescription drug AND [2] NO symptoms (Exception: a double dose of antibiotics)    Negative: Diabetes drug error or overdose (e.g., took wrong type of insulin or took extra dose)    Negative: [1] Request for URGENT new prescription or refill of \"essential\" medication (i.e., likelihood of harm to patient if not taken) AND [2] triager unable to fill per unit policy    Negative: [1] Prescription not at pharmacy AND [2] was prescribed by PCP recently    Negative: [1] Pharmacy calling with prescription questions AND [2] triager unable to answer question    Protocols used: MEDICATION QUESTION CALL-A-AH, NAUSEA-A-AH      "

## 2021-09-11 ENCOUNTER — MYC MEDICAL ADVICE (OUTPATIENT)
Dept: FAMILY MEDICINE | Facility: CLINIC | Age: 31
End: 2021-09-11

## 2021-09-11 DIAGNOSIS — R30.0 DYSURIA: Primary | ICD-10-CM

## 2021-09-12 ENCOUNTER — NURSE TRIAGE (OUTPATIENT)
Dept: NURSING | Facility: CLINIC | Age: 31
End: 2021-09-12

## 2021-09-12 DIAGNOSIS — B37.31 YEAST INFECTION OF THE VAGINA: Primary | ICD-10-CM

## 2021-09-12 RX ORDER — FLUCONAZOLE 150 MG/1
150 TABLET ORAL DAILY
Qty: 3 TABLET | Refills: 0 | Status: SHIPPED | OUTPATIENT
Start: 2021-09-12 | End: 2021-09-15

## 2021-09-12 NOTE — TELEPHONE ENCOUNTER
Patient calling stating she started treatment for bacterial vaginosis 3 days ago. Reporting symptoms have increased with white vaginal discharge and severe vaginal itching.  Stating it is burning when she is using Metrogel.   Patient is requesting to discontinue gel and treat for yeast infection.  Mild abdominal cramping. Afebrile.    Paged on call Dr Victor through [facility] on call/via cell phone.    Dr Victor advised to stop metro gel. Continue Flagyl orally as prescribed.  Advised to treat for symptoms of yeast infection with Diflucan 150 mg PO take 1 tablet daily x 3 days.    Patient notified agrees with treatment plan.    Jeni Berrios RN  [facility]    COVID 19 Nurse Triage Plan/Patient Instructions    Please be aware that novel coronavirus (COVID-19) may be circulating in the community. If you develop symptoms such as fever, cough, or SOB or if you have concerns about the presence of another infection including coronavirus (COVID-19), please contact your health care provider or visit [email or web address].     Disposition/Instructions    Home care recommended. Follow home care protocol based instructions.    Thank you for taking steps to prevent the spread of this virus.  o Limit your contact with others.  o Wear a simple mask to cover your cough.  o Wash your hands well and often.    Resources    [facility]: About COVID-19: [email or web address]    CDC: What to Do If You're Sick: www.cdc.gov/coronavirus/2019-ncov/about/steps-when-sick.html    CDC: Ending Home Isolation: www.cdc.gov/coronavirus/2019-ncov/hcp/disposition-in-home-patients.html     CDC: Caring for Someone: www.cdc.gov/coronavirus/2019-ncov/if-you-are-sick/care-for-someone.html     [facility]: Interim Guidance for Hospital Discharge to Home: www.health.[facility].mn.us/diseases/coronavirus/hcp/hospdischarge.pdf    [facility] clinical trials (COVID-19 research studies):  clinicalaffairs.[facility]-clinical-trials     Below are the COVID-19 hotlines at the [place] Department of Health ([facility]). Interpreters are available.   o For health questions: Call 158-328-7212 or 1-285.943.3531 (7 a.m. to 7 p.m.)  o For questions about schools and childcare: Call 090-154-0455 or 1-345.992.9491 (7 a.m. to 7 p.m.)                         Reason for Disposition    [1] Mild lower abdominal pain comes and goes (cramps) AND [2] lasts > 24 hours    Additional Information    Negative: Followed a genital area injury    Negative: Symptoms could be from sexual assault(AFTER using this guideline to treat symptoms, go to guideline SEXUAL ASSAULT OR RAPE)    Negative: Pain or burning with passing urine (urination) is main symptom    Negative: Foreign body in vagina (e.g., tampon)    Negative: [1] Pregnant > 20 weeks  (5 months or more) AND [2] contractions    Negative: Pregnant    Negative: [1] SEVERE abdominal pain (e.g., excruciating) AND [2] present > 1 hour    Negative: Patient sounds very sick or weak to the triager    Negative: [1] Yellow or green vaginal discharge AND [2] fever    Negative: [1] Genital area looks infected (e.g., draining sore, spreading redness) AND [2] fever    Negative: [1] Constant abdominal pain AND [2] present > 2 hours    Protocols used: VAGINAL [id number]

## 2021-09-13 LAB
BKR LAB AP GYN ADEQUACY: NORMAL
BKR LAB AP GYN INTERPRETATION: NORMAL
BKR LAB AP HPV REFLEX: NORMAL
BKR LAB AP PREVIOUS ABNORMAL: NORMAL
PATH REPORT.COMMENTS IMP SPEC: NORMAL
PATH REPORT.RELEVANT HX SPEC: NORMAL

## 2021-09-16 LAB
HUMAN PAPILLOMA VIRUS 16 DNA: NEGATIVE
HUMAN PAPILLOMA VIRUS 18 DNA: NEGATIVE
HUMAN PAPILLOMA VIRUS FINAL DIAGNOSIS: NORMAL
HUMAN PAPILLOMA VIRUS OTHER HR: NEGATIVE

## 2021-09-17 ENCOUNTER — PATIENT OUTREACH (OUTPATIENT)
Dept: FAMILY MEDICINE | Facility: CLINIC | Age: 31
End: 2021-09-17

## 2021-09-17 ENCOUNTER — LAB (OUTPATIENT)
Dept: LAB | Facility: CLINIC | Age: 31
End: 2021-09-17
Payer: COMMERCIAL

## 2021-09-17 ENCOUNTER — TELEPHONE (OUTPATIENT)
Dept: NURSING | Facility: CLINIC | Age: 31
End: 2021-09-17

## 2021-09-17 ENCOUNTER — MYC MEDICAL ADVICE (OUTPATIENT)
Dept: FAMILY MEDICINE | Facility: CLINIC | Age: 31
End: 2021-09-17

## 2021-09-17 DIAGNOSIS — B96.89 BV (BACTERIAL VAGINOSIS): Primary | ICD-10-CM

## 2021-09-17 DIAGNOSIS — R31.29 MICROSCOPIC HEMATURIA: Primary | ICD-10-CM

## 2021-09-17 DIAGNOSIS — N89.8 VAGINAL ITCHING: ICD-10-CM

## 2021-09-17 DIAGNOSIS — N76.0 BV (BACTERIAL VAGINOSIS): Primary | ICD-10-CM

## 2021-09-17 DIAGNOSIS — R30.0 DYSURIA: ICD-10-CM

## 2021-09-17 LAB
ALBUMIN UR-MCNC: 30 MG/DL
APPEARANCE UR: CLEAR
BILIRUB UR QL STRIP: NEGATIVE
CLUE CELLS: PRESENT
COLOR UR AUTO: YELLOW
GLUCOSE UR STRIP-MCNC: NEGATIVE MG/DL
HGB UR QL STRIP: ABNORMAL
KETONES UR STRIP-MCNC: NEGATIVE MG/DL
LEUKOCYTE ESTERASE UR QL STRIP: NEGATIVE
NITRATE UR QL: NEGATIVE
PH UR STRIP: 6 [PH] (ref 5–7)
RBC #/AREA URNS AUTO: ABNORMAL /HPF
SP GR UR STRIP: 1.01 (ref 1–1.03)
TRICHOMONAS, WET PREP: ABNORMAL
UROBILINOGEN UR STRIP-ACNC: 0.2 E.U./DL
WBC #/AREA URNS AUTO: ABNORMAL /HPF
WBC'S/HIGH POWER FIELD, WET PREP: ABNORMAL
YEAST, WET PREP: ABNORMAL

## 2021-09-17 PROCEDURE — 81001 URINALYSIS AUTO W/SCOPE: CPT

## 2021-09-17 PROCEDURE — 87210 SMEAR WET MOUNT SALINE/INK: CPT

## 2021-09-17 RX ORDER — NYSTATIN AND TRIAMCINOLONE ACETONIDE 100000; 1 [USP'U]/G; MG/G
CREAM TOPICAL 2 TIMES DAILY PRN
Qty: 30 G | Refills: 0 | Status: SHIPPED | OUTPATIENT
Start: 2021-09-17 | End: 2021-11-28

## 2021-09-17 RX ORDER — CLINDAMYCIN PHOSPHATE 20 MG/G
1 CREAM VAGINAL AT BEDTIME
Qty: 40 G | Refills: 0 | Status: SHIPPED | OUTPATIENT
Start: 2021-09-17 | End: 2021-09-24

## 2021-09-17 NOTE — TELEPHONE ENCOUNTER
Discussed results with patient on phone, all questions have been answered.  She will  new prescriptions today.    Edwina Lott RN    
She is calling in due to the abnormal results she is seeing in her chart from the labs she had done today.    She just got done treating for BV, but the symptoms have not changed.  She is still having vaginal itching and is very uncomfortable.  She would like to speak with someone from her care team right away about her abnormal labs and additional treatment.    Patient verbalized understanding and agrees with plan.    Dinorah Kinsey Nurse Triage Advisor 1:46 PM 9/17/2021  
0

## 2021-10-02 ENCOUNTER — HEALTH MAINTENANCE LETTER (OUTPATIENT)
Age: 31
End: 2021-10-02

## 2021-11-02 ENCOUNTER — NURSE TRIAGE (OUTPATIENT)
Dept: NURSING | Facility: CLINIC | Age: 31
End: 2021-11-02

## 2021-11-02 NOTE — TELEPHONE ENCOUNTER
Asking what safe OTC meds can be used for her ear pain, as she may be pregnant.       Woke with left eye pain and left ear pain and pressure, and saw eye doctor and prescribed eye drop antibiotics.  Denies eye redness, drainage.  Ear pain is worse and is moderate to severe.    Disposition recommends seeing provider today.  Patient may use Tylenol for pain and cool/warm packs.  Patient wants to try home care advice first and will follow up with eye doctor and call back for worsening symptoms/fever.    Eugenie Estrada RN  [facility]      Reason for Disposition    Severe earache pain    Additional Information    Negative: Severe eye pain    Negative: Complete loss of vision in one or both eyes    Negative: Eyelids are very swollen (shut or almost) and fever    Negative: Eyelid (outer) is very red and fever    Negative: Foreign body sensation ('feels like something is in there') and irrigation didn't help    Negative: Sounds like a life-threatening emergency to the triager    Negative: Pink or red swelling behind the ear    Negative: Stiff neck (can't touch chin to chest)    Negative: Patient sounds very sick or weak to the triager    Protocols used: EARACHE-A-OH, EYE PAIN-A-OH

## 2021-11-03 ENCOUNTER — HOSPITAL ENCOUNTER (EMERGENCY)
Facility: CLINIC | Age: 31
Discharge: HOME | End: 2021-11-03
Attending: EMERGENCY MEDICINE | Admitting: EMERGENCY MEDICINE
Payer: COMMERCIAL

## 2021-11-03 VITALS
DIASTOLIC BLOOD PRESSURE: 83 MMHG | SYSTOLIC BLOOD PRESSURE: 119 MMHG | HEART RATE: 99 BPM | RESPIRATION RATE: 18 BRPM | OXYGEN SATURATION: 98 % | TEMPERATURE: 96.9 F

## 2021-11-03 DIAGNOSIS — H65.92 OME (OTITIS MEDIA WITH EFFUSION), LEFT: ICD-10-CM

## 2021-11-03 PROCEDURE — 99284 EMERGENCY DEPT VISIT MOD MDM: CPT | Performed by: EMERGENCY MEDICINE

## 2021-11-03 PROCEDURE — 99283 EMERGENCY DEPT VISIT LOW MDM: CPT | Performed by: EMERGENCY MEDICINE

## 2021-11-03 RX ORDER — OXYCODONE AND ACETAMINOPHEN 5; 325 MG/1; MG/1
1 TABLET ORAL EVERY 6 HOURS PRN
Qty: 6 TABLET | Refills: 0 | Status: SHIPPED | OUTPATIENT
Start: 2021-11-03 | End: 2021-11-03

## 2021-11-03 RX ORDER — OXYCODONE AND ACETAMINOPHEN 5; 325 MG/1; MG/1
1 TABLET ORAL EVERY 6 HOURS PRN
Qty: 6 TABLET | Refills: 0 | Status: SHIPPED | OUTPATIENT
Start: 2021-11-03 | End: 2021-11-28

## 2021-11-03 NOTE — ED PROVIDER NOTES
History     Chief Complaint   Patient presents with     Otalgia     HPI  Wilma Palacios is a 31 year old female with past medical history significant for ankylosing spondylitis anxiety, hypothyroidism rheumatoid arthritis who presents the emergency department complaining of left ear pain and pressure.  Patient states symptoms began as eye pain and drainage and was diagnosed with a conjunctivitis and started on eyedrops.  Over the last few days the eye is improved and now she is having severe ear pressure and pain.  She was unable to sleep secondary to the pain.  She does not think she has had a fever she does feel very congested.  She has drainage down into her throat and chest has not had a significant cough.  Denies a sore throat.  She has not had any chest pain denies shortness of breath.  She has not had any nausea vomiting or diarrhea.  She denies abdominal pain.  She has not had any focal numbness weakness in extremity and denies any bowel or bladder dysfunction.  She has not had a rash.    Allergies:  Allergies   Allergen Reactions     Ascorbate Anaphylaxis     All Fruits.     Nuts Swelling and Nausea and Vomiting     Sulfa Drugs        Problem List:    Patient Active Problem List    Diagnosis Date Noted     Clinical diagnosis of COVID-19 05/03/2021     Priority: Medium     Cervical cancer screening 11/25/2020     Priority: Medium     2009 NIL pap  2013 NIL pap.  2014 NIL pap.  2017 NIL pap  11/19/20 NIL pap. Plan cotest in 1 year due bef 11/19/21 due to immunosuppression.   9/9/21 NIL Pap, Neg HPV. Plan cotest in 1 year. If NIL Pap, Neg HPV, then every 3 years.        Rheumatoid arthritis (H) 11/19/2020     Priority: Medium     Chronic chest wall pain 11/02/2018     Priority: Medium     Overview:   Secondary to spondyloarthropathy        HLA-B27 spondyloarthropathy 11/02/2018     Priority: Medium     Overview:   2nd opinion confirmed at [place], diagnosed at [facility]        Ankylosing spondylitis of multiple  sites in spine (H) 10/04/2018     Priority: Medium     Rheumatology note from [place] (1/15/19)  scanned into Epic.  Does not think there are any contraindications for vaginal birth.  Follow up with them 32-34 wks.  Pneumonia vaccine in 2nd or 3rd trimester.  Also follow up 6-8 weeks after delivery, sooner as needed       Anxiety 2017     Priority: Medium     Allergic rhinitis 2014     Priority: Medium     Hypothyroidism 2013     Priority: Medium        Past Medical History:    Past Medical History:   Diagnosis Date     Ankylosing spondylitis with multisystem involvement (H) 2012     Thyroid disease        Past Surgical History:    Past Surgical History:   Procedure Laterality Date      SECTION       COLONOSCOPY       COLONOSCOPY       EXTRACTION(S) DENTAL  2009       Family History:    Family History   Problem Relation Age of Onset     Skin Cancer Maternal Grandfather      Rheumatoid Arthritis Maternal Grandfather      Heart Disease Paternal Grandmother        Social History:  Marital Status:   [2]  Social History     Tobacco Use     Smoking status: Former Smoker     Packs/day: 0.00     Years: 0.00     Pack years: 0.00     Quit date: 2013     Years since quittin.8     Smokeless tobacco: Never Used   Substance Use Topics     Alcohol use: Yes     Comment: rarely     Drug use: No        Medications:    amoxicillin-clavulanate (AUGMENTIN) 875-125 MG tablet  oxyCODONE-acetaminophen (PERCOCET) 5-325 MG tablet  budesonide (RINOCORT AQUA) 32 MCG/ACT nasal spray  ENBREL MINI 50 MG/ML SOCT  fluticasone (FLONASE) 50 MCG/ACT nasal spray  loratadine (CLARITIN) 10 MG tablet  nystatin-triamcinolone (MYCOLOG II) 568212-3.1 UNIT/GM-% external cream  SYNTHROID 112 MCG tablet          Review of Systems  As per HPI.  Physical Exam   BP: 119/83  Pulse: 99  Temp: 96.9  F (36.1  C)  Resp: 18  SpO2: 98 %      Physical Exam  Vitals and nursing note reviewed.   Constitutional:       General:  She is not in acute distress.     Appearance: Normal appearance. She is not ill-appearing or toxic-appearing.      Comments: Moderate distress secondary to ear pain   HENT:      Head: Normocephalic and atraumatic.      Right Ear: Tympanic membrane normal.      Ears:      Comments: Left TM is mildly reddened and bulging no external drainage or inflammation of the external canal.  No tenderness to palpation of the ears surrounding the ear with no erythema or edema present.     Nose:      Comments: Mild to moderate nasal congestion.     Mouth/Throat:      Mouth: Mucous membranes are moist.      Pharynx: Oropharynx is clear.   Eyes:      Conjunctiva/sclera: Conjunctivae normal.   Cardiovascular:      Rate and Rhythm: Normal rate and regular rhythm.      Pulses: Normal pulses.      Heart sounds: Normal heart sounds. No murmur heard.     Pulmonary:      Effort: Pulmonary effort is normal.      Breath sounds: No wheezing, rhonchi or rales.   Chest:      Chest wall: No tenderness.   Musculoskeletal:         General: Normal range of motion.      Cervical back: Normal range of motion and neck supple. No tenderness.   Skin:     General: Skin is warm and dry.      Findings: No rash.   Neurological:      General: No focal deficit present.      Mental Status: She is alert.   Psychiatric:         Mood and Affect: Mood normal.         ED Course        Procedures              Critical Care time:  none               No results found for this or any previous visit (from the past 24 hour(s)).    Medications - No data to display    Assessments & Plan (with Medical Decision Making) records were reviewed.  Patient definitely has an left otitis media with effusion.  Patient states she is possibly very early stage pregnancy and therefore does not want any medication that can harm the pregnancy.  At this point I discussed that Tylenol is probably the safest medication but for severe pain which she appears to be in at this time I am going to  give her a few Percocets.  Although there are some effects if she needs his pain medication she should sparingly take.  Patient will consider this.  She cannot take over-the-counter medications at this point and I discussed possible a nasal lavage to see if this would help.  We will treat the patient with the Augmentin and have her follow-up with primary care.  I did offer to check for pregnancy test but patient did not want this done at this time.  She is advised to return if worsening pain fevers not controlled with ibuprofen or Tylenol vomiting chest pain focal numbness weakness any extremity or other symptoms present     I have reviewed the nursing notes.    I have reviewed the findings, diagnosis, plan and need for follow up with the patient.       Discharge Medication List as of 11/3/2021  5:09 AM      START taking these medications    Details   amoxicillin-clavulanate (AUGMENTIN) 875-125 MG tablet Take 1 tablet by mouth 2 times daily, Disp-20 tablet, R-0, [facility]             Final diagnoses:   OME (otitis media with effusion), left       11/3/2021   [facility] EMERGENCY DEPT     Kennedy Birmingham MD  11/03/21 0370

## 2021-11-03 NOTE — DISCHARGE INSTRUCTIONS
Return if symptoms worsen or new symptoms develop.  Drink plenty of fluids.  Take Augmentin as directed.  Take Tylenol as needed for pain for severe pain use Percocet.  You could try a [place] pot/nasal lavage.  If increased pain fevers vomiting severe headaches or other symptoms present please return for further evaluation and care.

## 2021-11-06 ENCOUNTER — OFFICE VISIT (OUTPATIENT)
Dept: URGENT CARE | Facility: URGENT CARE | Age: 31
End: 2021-11-06
Payer: COMMERCIAL

## 2021-11-06 ENCOUNTER — MYC MEDICAL ADVICE (OUTPATIENT)
Dept: FAMILY MEDICINE | Facility: CLINIC | Age: 31
End: 2021-11-06

## 2021-11-06 VITALS
OXYGEN SATURATION: 99 % | DIASTOLIC BLOOD PRESSURE: 70 MMHG | TEMPERATURE: 98.9 F | BODY MASS INDEX: 32.29 KG/M2 | SYSTOLIC BLOOD PRESSURE: 110 MMHG | RESPIRATION RATE: 16 BRPM | WEIGHT: 211.8 LBS | HEART RATE: 77 BPM

## 2021-11-06 DIAGNOSIS — H66.002 ACUTE SUPPURATIVE OTITIS MEDIA OF LEFT EAR WITHOUT SPONTANEOUS RUPTURE OF TYMPANIC MEMBRANE, RECURRENCE NOT SPECIFIED: ICD-10-CM

## 2021-11-06 DIAGNOSIS — N76.0 BV (BACTERIAL VAGINOSIS): ICD-10-CM

## 2021-11-06 DIAGNOSIS — B37.31 CANDIDIASIS OF VAGINA: Primary | ICD-10-CM

## 2021-11-06 DIAGNOSIS — B96.89 BV (BACTERIAL VAGINOSIS): ICD-10-CM

## 2021-11-06 LAB
ALBUMIN UR-MCNC: >=300 MG/DL
AMORPH CRY #/AREA URNS HPF: ABNORMAL /HPF
APPEARANCE UR: ABNORMAL
BACTERIA #/AREA URNS HPF: ABNORMAL /HPF
BILIRUB UR QL STRIP: NEGATIVE
CLUE CELLS: PRESENT
COLOR UR AUTO: YELLOW
GLUCOSE UR STRIP-MCNC: NEGATIVE MG/DL
HCG UR QL: NEGATIVE
HGB UR QL STRIP: ABNORMAL
KETONES UR STRIP-MCNC: NEGATIVE MG/DL
LEUKOCYTE ESTERASE UR QL STRIP: NEGATIVE
NITRATE UR QL: NEGATIVE
PH UR STRIP: 5.5 [PH] (ref 5–7)
RBC #/AREA URNS AUTO: ABNORMAL /HPF
SP GR UR STRIP: >=1.03 (ref 1–1.03)
SQUAMOUS #/AREA URNS AUTO: ABNORMAL /LPF
TRICHOMONAS, WET PREP: ABNORMAL
UROBILINOGEN UR STRIP-ACNC: 0.2 E.U./DL
WBC #/AREA URNS AUTO: ABNORMAL /HPF
WBC'S/HIGH POWER FIELD, WET PREP: ABNORMAL
YEAST, WET PREP: PRESENT

## 2021-11-06 PROCEDURE — 99213 OFFICE O/P EST LOW 20 MIN: CPT | Performed by: NURSE PRACTITIONER

## 2021-11-06 PROCEDURE — 81025 URINE PREGNANCY TEST: CPT | Performed by: NURSE PRACTITIONER

## 2021-11-06 PROCEDURE — 81001 URINALYSIS AUTO W/SCOPE: CPT | Performed by: NURSE PRACTITIONER

## 2021-11-06 PROCEDURE — 87210 SMEAR WET MOUNT SALINE/INK: CPT | Performed by: NURSE PRACTITIONER

## 2021-11-06 RX ORDER — CLINDAMYCIN HCL 300 MG
300 CAPSULE ORAL 2 TIMES DAILY
Qty: 14 CAPSULE | Refills: 0 | Status: SHIPPED | OUTPATIENT
Start: 2021-11-06 | End: 2021-11-13

## 2021-11-06 RX ORDER — CLOTRIMAZOLE 1 %
1 CREAM WITH APPLICATOR VAGINAL AT BEDTIME
Qty: 1 G | Refills: 0 | Status: SHIPPED | OUTPATIENT
Start: 2021-11-06 | End: 2021-11-09

## 2021-11-06 NOTE — PATIENT INSTRUCTIONS
Candidiasis of vagina  Acute, yeast infection.  Likely secondary to antibiotics for ear infection.  Recommend topical vaginal cream for 3 days due to the possibility of pregnancy, patient to update provider if not improving.  May need to do longer therapy.  - Wet prep - Clinic Collect  - clotrimazole (LOTRIMIN) 1 % vaginal cream; Place 1 Applicatorful vaginally At Bedtime for 3 days    BV (bacterial vaginosis)  Acute, recurrent.  Urinalysis negative for infection.  Urine hCG negative.  We will treat BV with clindamycin orally.  Patient to return to primary care provider if symptoms not proving or worsening.  - Wet prep - Clinic Collect  - UA macro with reflex to Microscopic and Culture - Clinc Collect  - HCG Qual, Urine ([id number]); Future  - HCG Qual, Urine ([id number])  - Urine Microscopic  - clindamycin (CLEOCIN) 300 MG capsule; Take 1 capsule (300 mg) by mouth 2 times daily for 7 days    Acute suppurative otitis media of left ear without spontaneous rupture of tympanic membrane, recurrence not specified  Recent ear infection, started on amoxicillin.  Stopped after 3 days.  You are still infected, recommend restarting amoxicillin as soon as possible.

## 2021-11-06 NOTE — PROGRESS NOTES
"Assessment & Plan     Candidiasis of vagina  Acute, yeast infection.  Likely secondary to antibiotics for ear infection.  Recommend topical vaginal cream for 3 days due to the possibility of pregnancy, patient to update provider if not improving.  May need to do longer therapy.  - Wet prep - Clinic Collect  - clotrimazole (LOTRIMIN) 1 % vaginal cream; Place 1 Applicatorful vaginally At Bedtime for 3 days    BV (bacterial vaginosis)  Acute, recurrent.  Urinalysis negative for infection.  Urine hCG negative.  We will treat BV with clindamycin orally.  Patient to return to primary care provider if symptoms not proving or worsening.  - Wet prep - Clinic Collect  - UA macro with reflex to Microscopic and Culture - Clinc Collect  - HCG Qual, Urine ([id number]); Future  - HCG Qual, Urine ([id number])  - Urine Microscopic  - clindamycin (CLEOCIN) 300 MG capsule; Take 1 capsule (300 mg) by mouth 2 times daily for 7 days    Acute suppurative otitis media of left ear without spontaneous rupture of tympanic membrane, recurrence not specified  Recent ear infection, started on amoxicillin.  Stopped after 3 days.  You are still infected, recommend restarting amoxicillin as soon as possible.              BMI:   Estimated body mass index is 32.29 kg/m  as calculated from the following:    Height as of 11/19/20: 1.725 m (5' 7.91\").    Weight as of this encounter: 96.1 kg (211 lb 12.8 oz).   Weight management plan: Patient was referred to their PCP to discuss a diet and exercise plan.      See patient instructions    Return in about 1 week (around 11/13/2021), or if symptoms worsen or fail to improve.    Annita Holder, DNP, APRN-CNP   [facility]    Subjective     Wilma ELAN Palacios is a 31 year old female who presents today for the following health issues:      HPI    Vaginal Symptoms      Duration: last evening     Description  itching    Intensity:  mild    Accompanying signs and symptoms " (fever/dysuria/abdominal or back pain): None    History  Sexually active: yes, single partner, no contraception-currently trying to conceive  Possibility of pregnancy: Yes  Recent antibiotic use: YES-amoxicillin for ear infection, stopped after 3 days    Precipitating or alleviating factors: None    Therapies tried and outcome: one dose of clindamycin gel this morning; amoxicillin last dose 1 day ago for ear infection  Outcome: Nothing yet    Due for period on the 14th  Last week has had more acne       Review of Systems  Constitutional, HEENT, cardiovascular, pulmonary, gi and gu systems are negative, except as otherwise noted.    Objective   /70 (BP Location: Right arm, Patient Position: Sitting, Cuff Size: Adult Regular)   Pulse 77   Temp 98.9  F (37.2  C) (Tympanic)   Resp 16   Wt 96.1 kg (211 lb 12.8 oz)   LMP 10/13/2021 (Approximate)   SpO2 99%   BMI 32.29 kg/m    Body mass index is 32.29 kg/m .    Physical Exam  GENERAL APPEARANCE: healthy, alert and no distress  RESP: lungs clear to auscultation - no rales, rhonchi or wheezes  CV: regular rates and rhythm, normal S1 S2, no S3 or S4 and no murmur, click or rub  ABDOMEN: soft, nontender, without hepatosplenomegaly or masses and bowel sounds normal  MS: extremities normal- no gross deformities noted and no CVA tenderness  SKIN: no suspicious lesions or rashes  PSYCH: mentation appears normal and affect normal/bright    Diagnostic Test Results:  Results for orders placed or performed in visit on 11/06/21 (from the past 24 hour(s))   Wet prep - Clinic Collect    Specimen: Vagina; Swab   Result Value Ref Range    Trichomonas Absent Absent    Yeast Present (A) Absent    Clue Cells Present (A) Absent    WBCs/high power field 1+ (A) None   UA macro with reflex to Microscopic and Culture - Clinc Collect    Specimen: Urine, Clean Catch   Result Value Ref Range    Color Urine Yellow Colorless, Straw, Light Yellow, Yellow    Appearance Urine Turbid (A) Clear     Glucose Urine Negative Negative mg/dL    Bilirubin Urine Negative Negative    Ketones Urine Negative Negative mg/dL    Specific Gravity Urine >=1.030 1.003 - 1.035    Blood Urine Large (A) Negative    pH Urine 5.5 5.0 - 7.0    Protein Albumin Urine >=300 (A) Negative mg/dL    Urobilinogen Urine 0.2 0.2, 1.0 E.U./dL    Nitrite Urine Negative Negative    Leukocyte Esterase Urine Negative Negative   HCG Qual, Urine ([id number])   Result Value Ref Range    hCG Urine Qualitative Negative Negative   Urine Microscopic   Result Value Ref Range    Bacteria Urine Few (A) None Seen /HPF    RBC Urine 25-50 (A) 0-2 /HPF /HPF    WBC Urine 0-5 0-5 /HPF /HPF    Squamous Epithelials Urine Many (A) None Seen /LPF    Amorphous Crystals Urine Moderate (A) None Seen /HPF    Narrative    Urine Culture not indicated            Chart documentation with Dragon Voice recognition Software. Although reviewed after completion, some words and grammatical errors may remain.

## 2021-11-08 NOTE — TELEPHONE ENCOUNTER
Patient seen in [facility] over the weekend.  MyChart sent.     Niki Petty RN  [facility]

## 2021-11-09 ENCOUNTER — MYC MEDICAL ADVICE (OUTPATIENT)
Dept: FAMILY MEDICINE | Facility: CLINIC | Age: 31
End: 2021-11-09
Payer: COMMERCIAL

## 2021-11-09 DIAGNOSIS — N76.0 BV (BACTERIAL VAGINOSIS): Primary | ICD-10-CM

## 2021-11-09 DIAGNOSIS — B96.89 BV (BACTERIAL VAGINOSIS): Primary | ICD-10-CM

## 2021-11-09 RX ORDER — METRONIDAZOLE 7.5 MG/G
1 GEL VAGINAL DAILY
Qty: 25 G | Refills: 0 | Status: SHIPPED | OUTPATIENT
Start: 2021-11-09 | End: 2021-11-14

## 2021-11-26 ENCOUNTER — LAB (OUTPATIENT)
Dept: LAB | Facility: CLINIC | Age: 31
End: 2021-11-26
Payer: COMMERCIAL

## 2021-11-26 DIAGNOSIS — N76.0 BV (BACTERIAL VAGINOSIS): ICD-10-CM

## 2021-11-26 DIAGNOSIS — B96.89 BV (BACTERIAL VAGINOSIS): ICD-10-CM

## 2021-11-26 LAB
CLUE CELLS: ABNORMAL
TRICHOMONAS, WET PREP: ABNORMAL
WBC'S/HIGH POWER FIELD, WET PREP: ABNORMAL
YEAST, WET PREP: PRESENT

## 2021-11-26 PROCEDURE — 87210 SMEAR WET MOUNT SALINE/INK: CPT

## 2021-11-27 ENCOUNTER — NURSE TRIAGE (OUTPATIENT)
Dept: NURSING | Facility: CLINIC | Age: 31
End: 2021-11-27
Payer: COMMERCIAL

## 2021-11-28 ENCOUNTER — OFFICE VISIT (OUTPATIENT)
Dept: URGENT CARE | Facility: URGENT CARE | Age: 31
End: 2021-11-28
Payer: COMMERCIAL

## 2021-11-28 VITALS
SYSTOLIC BLOOD PRESSURE: 108 MMHG | RESPIRATION RATE: 16 BRPM | HEART RATE: 86 BPM | WEIGHT: 216 LBS | OXYGEN SATURATION: 98 % | BODY MASS INDEX: 32.93 KG/M2 | DIASTOLIC BLOOD PRESSURE: 68 MMHG | TEMPERATURE: 98.8 F

## 2021-11-28 DIAGNOSIS — B37.31 CANDIDIASIS OF VAGINA: Primary | ICD-10-CM

## 2021-11-28 DIAGNOSIS — N30.00 ACUTE CYSTITIS WITHOUT HEMATURIA: ICD-10-CM

## 2021-11-28 LAB
ALBUMIN UR-MCNC: >=300 MG/DL
APPEARANCE UR: ABNORMAL
BACTERIA #/AREA URNS HPF: ABNORMAL /HPF
BILIRUB UR QL STRIP: NEGATIVE
CLUE CELLS: ABNORMAL
COLOR UR AUTO: YELLOW
GLUCOSE UR STRIP-MCNC: NEGATIVE MG/DL
HGB UR QL STRIP: ABNORMAL
KETONES UR STRIP-MCNC: NEGATIVE MG/DL
LEUKOCYTE ESTERASE UR QL STRIP: ABNORMAL
NITRATE UR QL: NEGATIVE
PH UR STRIP: 5 [PH] (ref 5–7)
RBC #/AREA URNS AUTO: ABNORMAL /HPF
SP GR UR STRIP: >=1.03 (ref 1–1.03)
SQUAMOUS #/AREA URNS AUTO: ABNORMAL /LPF
TRICHOMONAS, WET PREP: ABNORMAL
UROBILINOGEN UR STRIP-ACNC: 0.2 E.U./DL
WBC #/AREA URNS AUTO: ABNORMAL /HPF
WBC'S/HIGH POWER FIELD, WET PREP: ABNORMAL
YEAST #/AREA URNS HPF: ABNORMAL /HPF
YEAST, WET PREP: PRESENT

## 2021-11-28 PROCEDURE — 81001 URINALYSIS AUTO W/SCOPE: CPT | Performed by: NURSE PRACTITIONER

## 2021-11-28 PROCEDURE — 99213 OFFICE O/P EST LOW 20 MIN: CPT | Performed by: NURSE PRACTITIONER

## 2021-11-28 PROCEDURE — 87210 SMEAR WET MOUNT SALINE/INK: CPT | Performed by: NURSE PRACTITIONER

## 2021-11-28 PROCEDURE — 87086 URINE CULTURE/COLONY COUNT: CPT | Performed by: NURSE PRACTITIONER

## 2021-11-28 RX ORDER — CLOTRIMAZOLE 1 %
1 CREAM WITH APPLICATOR VAGINAL AT BEDTIME
COMMUNITY
End: 2021-11-28

## 2021-11-28 RX ORDER — NITROFURANTOIN 25; 75 MG/1; MG/1
100 CAPSULE ORAL 2 TIMES DAILY
Qty: 10 CAPSULE | Refills: 0 | Status: SHIPPED | OUTPATIENT
Start: 2021-11-28 | End: 2021-12-03

## 2021-11-28 RX ORDER — NYSTATIN AND TRIAMCINOLONE ACETONIDE 100000; 1 [USP'U]/G; MG/G
CREAM TOPICAL 2 TIMES DAILY PRN
Qty: 30 G | Refills: 0 | Status: SHIPPED | OUTPATIENT
Start: 2021-11-28 | End: 2022-02-06

## 2021-11-28 RX ORDER — CLOTRIMAZOLE 1 %
1 CREAM WITH APPLICATOR VAGINAL AT BEDTIME
Qty: 45 G | Refills: 0 | Status: SHIPPED | OUTPATIENT
Start: 2021-11-28 | End: 2022-02-06

## 2021-11-28 RX ORDER — FLUCONAZOLE 150 MG/1
150 TABLET ORAL ONCE
Qty: 2 TABLET | Refills: 0 | Status: SHIPPED | OUTPATIENT
Start: 2021-11-28 | End: 2021-11-28
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
## 2021-11-28 NOTE — PATIENT INSTRUCTIONS
Candidiasis of vagina  Acute, recurrent yeast infection.  Currently restarted Lotrimin vaginal cream 2 days ago without much improvement.  Will have patient continue cream, start Diflucan when patient finds out if positive pregnancy or not.  Do not take with pregnancy.  Also have patient use Mycolog cream externally for vaginal labial itchiness.  Follow-up if not improving or worsening.  - Wet prep - Clinic Collect  - fluconazole (DIFLUCAN) 150 MG tablet; Take 1 tablet (150 mg) by mouth once for 1 dose May repeat in 3 days  - nystatin-triamcinolone (MYCOLOG II) 314790-2.1 UNIT/GM-% external cream; Apply topically 2 times daily as needed (vaginal and labial  itchiness)  - clotrimazole (LOTRIMIN) 1 % vaginal cream; Place 1 Applicatorful vaginally At Bedtime    Acute cystitis without hematuria  Acute, urinary tract infection, may be caused from yeast as above.  Recommend treatment with antibiotic for 5 days.  Encourage patient to push fluids and follow-up with primary when feeling better for repeat urine.  - UA macro with reflex to Microscopic and Culture - Clinc Collect  - Urine Microscopic  - Urine Culture  - nitroFURantoin macrocrystal-monohydrate (MACROBID) 100 MG capsule; Take 1 capsule (100 mg) by mouth 2 times daily for 5 days

## 2021-11-28 NOTE — PROGRESS NOTES
Assessment & Plan     Candidiasis of vagina  Acute, recurrent yeast infection.  Currently restarted Lotrimin vaginal cream 2 days ago without much improvement.  Will have patient continue cream, start Diflucan when patient finds out if positive pregnancy or not.  Do not take with pregnancy.  Also have patient use Mycolog cream externally for vaginal labial itchiness.  Follow-up if not improving or worsening.  - Wet prep - Clinic Collect  - fluconazole (DIFLUCAN) 150 MG tablet; Take 1 tablet (150 mg) by mouth once for 1 dose May repeat in 3 days  - nystatin-triamcinolone (MYCOLOG II) 459518-4.1 UNIT/GM-% external cream; Apply topically 2 times daily as needed (vaginal and labial  itchiness)  - clotrimazole (LOTRIMIN) 1 % vaginal cream; Place 1 Applicatorful vaginally At Bedtime    Acute cystitis without hematuria  Acute, urinary tract infection, may be caused from yeast as above.  Recommend treatment with antibiotic for 5 days.  Encourage patient to push fluids and follow-up with primary when feeling better for repeat urine.  - UA macro with reflex to Microscopic and Culture - Clinc Collect  - Urine Microscopic  - Urine Culture  - nitroFURantoin macrocrystal-monohydrate (MACROBID) 100 MG capsule; Take 1 capsule (100 mg) by mouth 2 times daily for 5 days             See patient instructions    Return in about 1 week (around 12/5/2021), or if symptoms worsen or fail to improve.    Annita Holder, DNP, APRN-CNP   [facility]     Wilma Palacios is a 31 year old female who presents today for the following health issues:      HPI    Vaginal Symptoms      Duration: 2 days worse    Description  itching, burning and odor    Intensity:  moderate    Accompanying signs and symptoms (fever/dysuria/abdominal or back pain): None    History  Sexually active: yes, single partner, contraception - none, currently trying to conceive   Possibility of pregnancy: Yes  Recent antibiotic use:  YES    Precipitating or alleviating factors: None    Therapies tried and outcome: Tiny-fungal cream x 2 days, not helpful yet, probiotic started 4 days ago          Review of Systems  Constitutional, HEENT, cardiovascular, pulmonary, gi and gu systems are negative, except as otherwise noted.    Objective   /68 (BP Location: Right arm, Patient Position: Sitting, Cuff Size: Adult Large)   Pulse 86   Temp 98.8  F (37.1  C) (Tympanic)   Resp 16   Wt 98 kg (216 lb)   LMP 11/08/2021 (Exact Date)   SpO2 98%   BMI 32.93 kg/m    Body mass index is 32.93 kg/m .    Physical Exam  GENERAL APPEARANCE: healthy, alert and no distress  ABDOMEN: soft, nontender, without hepatosplenomegaly or masses and bowel sounds normal  MS: extremities normal- no gross deformities noted and no CVA tenderness  SKIN: no suspicious lesions or rashes  PSYCH: mentation appears normal and affect normal/bright    Diagnostic Test Results:  Results for orders placed or performed in visit on 11/28/21 (from the past 24 hour(s))   Wet prep - Clinic Collect    Specimen: Vagina; Swab   Result Value Ref Range    Trichomonas Absent Absent    Yeast Present (A) Absent    Clue Cells Absent Absent    WBCs/high power field 4+ (A) None   UA macro with reflex to Microscopic and Culture - Clinc Collect    Specimen: Urine, Clean Catch   Result Value Ref Range    Color Urine Yellow Colorless, Straw, Light Yellow, Yellow    Appearance Urine Cloudy (A) Clear    Glucose Urine Negative Negative mg/dL    Bilirubin Urine Negative Negative    Ketones Urine Negative Negative mg/dL    Specific Gravity Urine >=1.030 1.003 - 1.035    Blood Urine Large (A) Negative    pH Urine 5.0 5.0 - 7.0    Protein Albumin Urine >=300 (A) Negative mg/dL    Urobilinogen Urine 0.2 0.2, 1.0 E.U./dL    Nitrite Urine Negative Negative    Leukocyte Esterase Urine Small (A) Negative   Urine Microscopic   Result Value Ref Range    Bacteria Urine Moderate (A) None Seen /HPF    RBC Urine 0-2  0-2 /HPF /HPF    WBC Urine  (A) 0-5 /HPF /HPF    Squamous Epithelials Urine Many (A) None Seen /LPF    Hyphal Yeast Urine Few (A) None Seen /HPF            Chart documentation with Dragon Voice recognition Software. Although reviewed after completion, some words and grammatical errors may remain.

## 2021-11-30 LAB — BACTERIA UR CULT: NORMAL

## 2021-12-03 ENCOUNTER — MYC MEDICAL ADVICE (OUTPATIENT)
Dept: FAMILY MEDICINE | Facility: CLINIC | Age: 31
End: 2021-12-03
Payer: COMMERCIAL

## 2021-12-03 ENCOUNTER — NURSE TRIAGE (OUTPATIENT)
Dept: NURSING | Facility: CLINIC | Age: 31
End: 2021-12-03
Payer: COMMERCIAL

## 2021-12-03 DIAGNOSIS — N89.8 VAGINAL ITCHING: Primary | ICD-10-CM

## 2021-12-04 NOTE — TELEPHONE ENCOUNTER
"Pt reports she might be pregnant, possibly four weeks, has been trying to get pregnant, has not taken pregnancy test yet, period due to start tomorrow. \"Spotting throughout the day today and usually period comes on strong\". Fluconazole taken on Tuesday. Pt is wondering about increased chance of miscarriage with use of Fluconazole early in pregnancy.     Advised pt to take a pregnancy test first day of missed period. No way to know if Fluconazole will cause miscarriage and nothing can be done if miscarriage occurs early on in pregnancy. Call back if new symptoms or concerns or pregnancy test positive.    Pt verbalizes understanding and agrees to plan.       Reason for Disposition    SPOTTING (single or brief episode)    Protocols used: PREGNANCY - VAGINAL BLEEDING LESS THAN 20 WEEKS EGA-A-CAYLA      "

## 2021-12-15 ENCOUNTER — TELEPHONE (OUTPATIENT)
Dept: OBGYN | Facility: CLINIC | Age: 31
End: 2021-12-15
Payer: COMMERCIAL

## 2021-12-15 NOTE — TELEPHONE ENCOUNTER
Pt is concerned that she hasn't conceived after trying for 2-3 months.  She is wondering if something is wrong with her because she conceived after only having intercourse once.  I explained to her that she has about a 20% of conceiving each month and to not get discouraged.    She has been using an ovulation predictor kit this cycle and is concerned that it isn't showing she is ovulating.  She states that her cervical mucus is favorable.  Went over ovulation kit use as well as BBT.  Discussed timed intercourse but also not waiting to have intercourse until next cycle as she had her  wait almost a month before intercourse as she thought that would increase her chances.    Encouraged pt to continue to track cervical mucus, track BBT's and time intercourse.  If she is unsuccessful of conceiving for at least a year then she can schedule an appt with our providers to discuss further.    Flor Churchill RN

## 2021-12-29 ENCOUNTER — MYC MEDICAL ADVICE (OUTPATIENT)
Dept: OBGYN | Facility: CLINIC | Age: 31
End: 2021-12-29
Payer: COMMERCIAL

## 2021-12-29 DIAGNOSIS — O09.899 SUPERVISION OF OTHER HIGH RISK PREGNANCY, ANTEPARTUM: Primary | ICD-10-CM

## 2021-12-29 NOTE — TELEPHONE ENCOUNTER
", Last seen 10/18/2019 for recurrent vaginitis, uti and hematuria.    Pt is with positive home pregnancy test. LMP 12/3/21, currently 3w 5d.    Due to hx of Ankylosing spondylitis, pt states was told she's high risk pregnancy and should be seen around 6 weeks w/next pregnancy.    Pt denies spotting. Pt is with \"super minimal cramping hardly any discomfort at all\". Mild nausea.  RN sent nausea in pregnancy tips.    Routing to provider for advisement on timing of first OB appt and ultrasound per pt request.    RAMO PatelN RN    "

## 2021-12-29 NOTE — TELEPHONE ENCOUNTER
Agree with advice. Recommend checking an early ultrasound at 6.5 to 7 weeks and a follow-up first OB visit at 7-8 weeks. Order placed. Please notify.     Bryn He MD

## 2021-12-30 ENCOUNTER — LAB (OUTPATIENT)
Dept: LAB | Facility: CLINIC | Age: 31
End: 2021-12-30
Attending: PHYSICIAN ASSISTANT
Payer: COMMERCIAL

## 2021-12-30 ENCOUNTER — E-VISIT (OUTPATIENT)
Dept: URGENT CARE | Facility: URGENT CARE | Age: 31
End: 2021-12-30
Payer: COMMERCIAL

## 2021-12-30 DIAGNOSIS — Z20.822 CLOSE EXPOSURE TO 2019 NOVEL CORONAVIRUS: ICD-10-CM

## 2021-12-30 DIAGNOSIS — Z20.822 CLOSE EXPOSURE TO 2019 NOVEL CORONAVIRUS: Primary | ICD-10-CM

## 2021-12-30 PROCEDURE — 99421 OL DIG E/M SVC 5-10 MIN: CPT | Performed by: PHYSICIAN ASSISTANT

## 2021-12-30 PROCEDURE — U0005 INFEC AGEN DETEC AMPLI PROBE: HCPCS

## 2021-12-30 PROCEDURE — U0003 INFECTIOUS AGENT DETECTION BY NUCLEIC ACID (DNA OR RNA); SEVERE ACUTE RESPIRATORY SYNDROME CORONAVIRUS 2 (SARS-COV-2) (CORONAVIRUS DISEASE [COVID-19]), AMPLIFIED PROBE TECHNIQUE, MAKING USE OF HIGH THROUGHPUT TECHNOLOGIES AS DESCRIBED BY CMS-2020-01-R: HCPCS

## 2021-12-30 NOTE — PATIENT INSTRUCTIONS
Dear Wilma,    Based on your exposure to COVID-19 (coronavirus), we would like to test you for this virus. I have placed an order for this test. The best time for testing is 5-7 days after the exposure.    How to schedule:  Go to your [facility] home page and scroll down to the section that says  You have an appointment that needs to be scheduled  and click the large green button that says  Schedule Now  and follow the steps to find the next available opening.     If you are unable to complete these [facility] scheduling steps, please call [phone] to schedule your testing.     Monoclonal antibody treatment after exposure:  Because you have been exposed to COVID-19, you may be able to receive a treatment with monoclonal antibodies. This treatment can lower your risk of severe illness and going to the hospital. It is given through an IV or under your skin (subcutaneous) and must be given at an infusion center.   To be eligible, you must be 12 years or older, at least 88 pounds and:    Are not fully vaccinated against COVID-19, OR    Are immunocompromised     If you would like to sign up to be considered to receive the monoclonal antibody medicine, please complete a participation form through the [facility] here:  MNRAP ([email or web address]). You may also call the [facility] COVID-19 Public Hotline at 1-479.165.6869 (open Mon-Fri: 9am-7pm and Sat: 10am-6pm).     Not all people who are eligible will receive the medicine since supply is limited. You will be contacted in the next 1 to 2 business days only if you are selected. If you do not receive a call, you have not been selected to receive the medicine. If you have any questions about this medication, please contact your primary care provider. For more information, see [email or web address]    Return to work/school/ guidance:   Please let your workplace manager and  staffing office know when your quarantine ends. We cannot give you an exact date as it depends on the information below. You can calculate this on your own or work with your manager/staffing office to calculate this.    Quarantine Guidelines:  You are considered exposed if you have been within 6 feet of an infected person(s) for 15 minutes or more over a 24-hour period. Quarantine will start after the LAST time you had contact with the infected person while they were contagious (for example, if you saw someone on Monday and Wednesday, your quarantine would start after Wednesday).     If you have NO symptoms (asymptomatic):    Stay home for 14 days (quarantine) after your LAST contact with a person who has COVID-19 (this remains the CDC recommendation for greatest protection against spread of COVID-19), OR    10-day quarantine with no test, OR    Minimum 7-day quarantine with negative RT-PCR test collected on day 5 or later    Quarantine Guideline exceptions:    People who have been fully vaccinated do not need to quarantine unless they have symptoms. You are considered fully vaccinated 2 weeks after your 2nd dose in a 2-dose series or 2 weeks after a single-dose series. This includes vaccinated health care workers.  o Fully vaccinated people should still get tested 5-7 days after exposure, even if they don t have symptoms.   Note: If you have ongoing exposure to the COVID-positive person, this quarantine period may be more than 14 days. For example, if you continue to be exposed to your child who tested positive and cannot isolate from them, then the quarantine of 7-14 days can't start until your child is no longer contagious. This is typically 10 days from onset of the child's symptoms. So, the total duration may be 17-24 days in this case.   Please contact your doctor if you have questions or call the [facility] Public Hotline: [phone] (Mon-Fri: 9am-7pm and Sat: 10am-6pm).     How to Quarantine:     Monitor your  symptoms until 14 days after your last exposure. If you develop signs or symptoms, isolate and get tested (even if you have been tested already).    Stay home and away from others. Don't go to school or anywhere else. Generally, quarantine means staying home from work but there are some exceptions to this. Please contact your workplace. Cover your mouth and nose with a face covering if you must be around other people.     Wash your hands and face often. Use soap and water.    What are the symptoms of COVID-19?  The most common symptoms are cough, fever and trouble breathing. Less common symptoms include headache, body aches, fatigue (feeling very tired), chills, sore throat, stuffy or runny nose, diarrhea (loose poop), loss of taste or smell, belly pain, and nausea or vomiting (feeling sick to your stomach or throwing up).  If you develop symptoms, follow these guidelines:    If you're normally healthy: Please start another eVisit.    If you have a serious health problem (like cancer, heart failure, an organ transplant or kidney disease): Call your specialty clinic. Let them know that you might have COVID-19.    Where can I get more information?    [facility] - About COVID-19: [email or web address]     CDC - What to Do If You're Sick:     www.cdc.gov/coronavirus/2019-ncov/about/steps-when-sick.html    CDC - Ending Home Isolation:  https://www.cdc.gov/coronavirus/2019-ncov/your-health/quarantine-isolation.html    CDC - Caring for Someone:  www.cdc.gov/coronavirus/2019-ncov/if-you-are-sick/care-for-someone.html    [facility] clinical trials (COVID-19 research studies): [email or web address]/umn-clinical-trials    Below are the COVID-19 hotlines at the [facility] ([facility]). Interpreters are available.  o For health questions: Call 287-786-4287 or 1-868.823.1903 (7 am to 7 pm)  o For questions about schools and childcare: Call 866-854-8825 or 1-871.236.3294 (7 a.m. to 7  "p.m.)    December 30, 2021  RE:  Wilma Palacios                                                                                                                   [id number] [address] [place] [id number]      To whom it may concern:    I evaluated Wilma Palacios on December 30, 2021. Wilma Palacios should be excused from work/school.    They should let their workplace manager and staffing office know when their quarantine ends.    We can not give an exact date as it depends on the information below. They can calculate this on their own or work with their manager/staffing office to calculate this. (For example if they were exposed on 10/04, they would have to quarantine for 14 full days. That would be through 10/18. They could return on 10/19.)    Quarantine Guidelines:    Patients (\"contacts\") who have been in close prolonged contact of an infected person(s) (within six feet for at least 15 minutes within a 24 hour period) and remain asymptomatic should enter quarantine based on the following options:      14-day quarantine period (this remains the CDC recommendation for the greatest protection against spread of COVID-19) OR    Minimum 7-day quarantine with negative RT-PCR test collected on day 5 or later OR    10-day quarantine with no test   Quarantine Guideline exceptions are as follows:    People who have been fully vaccinated do not need to quarantine if the exposure was at least 2 weeks after the last vaccination. This includes vaccinated health care workers.    Not fully vaccinated and unvaccinated Individuals who work in health care, congregate care, or congregate living should be off work for 14 days from their last date of exposure. Community activities for this group can be resumed based on options above. Fully vaccinated individuals in this group do not need to quarantine from work after exposure.    Not fully vaccinated and unvaccinated people whose high-risk exposure was a household member should always " quarantine for 14 days from their last date of exposure. Fully vaccinated people in this category do not need to quarantine.    Not fully vaccinated or unvaccinated residents of congregate care and congregate living settings should always quarantine for 14 days from their last date of exposure. Fully vaccinated residents do not need to quarantine.    Note: If there is ongoing exposure to the covid positive person, this quarantine period may be longer than 14 days. (For example, if they are continually exposed to their child, who tested positive and cannot isolate from them, then the quarantine of 7-14 days can't start until their child is no longer contagious. This is typically 10 days from onset to the child's symptoms. So the total duration may be 17-24 days in this case.)    Wilma Palacios should continue symptom monitoring until day 14 post-exposure. If they develop signs or symptoms of COVID-19, they should isolate and get tested (even if they have been tested already).    Sincerely,  Aaron Perla PA-C

## 2021-12-31 ENCOUNTER — TELEPHONE (OUTPATIENT)
Dept: FAMILY MEDICINE | Facility: CLINIC | Age: 31
End: 2021-12-31
Payer: COMMERCIAL

## 2021-12-31 LAB — SARS-COV-2 RNA RESP QL NAA+PROBE: NEGATIVE

## 2022-01-04 DIAGNOSIS — M45.9 ANKYLOSING SPONDYLITIS (H): Primary | ICD-10-CM

## 2022-01-04 NOTE — TELEPHONE ENCOUNTER
[facility] Call Center    Phone Message    May a detailed message be left on voicemail: yes     Reason for Call: Other: Wilma returning a message from Camilla to schedule her New Prenatal appt with Dr. He.  Dr. He first available at  wasn't until April.  She is scheduled with him at  for 1/26.  She does not want to see another provider due to her wanting him to be her surgeon.  She is hoping to have her appointments at [place].  Wilma is wondering if this is possible and is hoping to clarify her plan of care.  Please call her back to discuss    Action Taken: Message routed to:  [id number]    Travel Screening: Not Applicable

## 2022-01-04 NOTE — TELEPHONE ENCOUNTER
RN called and spoke with pt and advised pt that Dr. He has openings in [place] more frequently than at  and she may need to seem him there or can see other providers in our group in  if she prefers that location.    RN relayed OK to have labs and US done at [facility] if this is closer for pt.    Patient verbalized understanding and agreed to plan.     Patient was given the opportunity to ask additional questions and have them answered. Patient had no further questions.     Camilla Brown RN on 1/4/2022 at 1:26 PM

## 2022-01-09 ENCOUNTER — NURSE TRIAGE (OUTPATIENT)
Dept: NURSING | Facility: CLINIC | Age: 32
End: 2022-01-09
Payer: COMMERCIAL

## 2022-01-09 NOTE — TELEPHONE ENCOUNTER
6 weeks pregnant. Ever since pregnant, has really bad rhinitis, post nasal drainage and it's worse in the morning and she's gagging because of it. Is it okay to use  Nasal sprays? I read to her the following from our Specialties reference on pregnancy meds, explaining that it can help dry her up a bit. She has no further questions. I explained there are non-drowsy formulas of antihistamines.  Celena Knight RN  [facility]            Reason for Disposition    Caller has medication question, adult has minor symptoms, caller declines triage, AND triager answers question    Additional Information    Negative: Drug overdose and triager unable to answer question    Negative: Caller requesting information unrelated to medicine    Negative: Caller requesting a prescription for Strep throat and has a positive culture result    Negative: Rash while taking a medication or within 3 days of stopping it    Negative: Immunization reaction suspected    Negative: [1] Asthma and [2] having symptoms of asthma (cough, wheezing, etc.)    Negative: [1] Influenza symptoms AND [2] anti-viral med prescription request, such as Tamiflu    Negative: [1] Symptom of illness (e.g., headache, abdominal pain, earache, vomiting) AND [2] more than mild    Negative: MORE THAN A DOUBLE DOSE of a prescription or over-the-counter (OTC) drug    Negative: [1] DOUBLE DOSE (an extra dose or lesser amount) of over-the-counter (OTC) drug AND [2] any symptoms (e.g., dizziness, nausea, pain, sleepiness)    Negative: [1] DOUBLE DOSE (an extra dose or lesser amount) of prescription drug AND [2] any symptoms (e.g., dizziness, nausea, pain, sleepiness)    Negative: Took another person's prescription drug    Negative: [1] DOUBLE DOSE (an extra dose or lesser amount) of prescription drug AND [2] NO symptoms (Exception: a double dose of antibiotics)    Negative: Diabetes drug error or overdose (e.g., took wrong type of insulin or took extra dose)     "Negative: [1] Request for URGENT new prescription or refill of \"essential\" medication (i.e., likelihood of harm to patient if not taken) AND [2] triager unable to fill per unit policy    Negative: [1] Prescription not at pharmacy AND [2] was prescribed by PCP recently    Negative: [1] Pharmacy calling with prescription questions AND [2] triager unable to answer question    Negative: [1] Caller has URGENT medication question about med that PCP or specialist prescribed AND [2] triager unable to answer question    Negative: [1] Caller has NON-URGENT medication question about med that PCP prescribed AND [2] triager unable to answer question    Negative: [1] Caller requesting a NON-URGENT new prescription or refill AND [2] triager unable to refill per unit policy    Negative: [1] Caller has medication question about med not prescribed by PCP AND [2] triager unable to answer question (e.g., compatibility with other med, storage)    Negative: Caller requesting a CONTROLLED substance prescription refill (e.g., narcotics, ADHD medicines)    Negative: Caller wants to use a complementary or alternative medicine    Negative: [1] Prescription prescribed recently is not at pharmacy AND [2] triager has access to patient's EMR AND [3] prescription is recorded in the EMR    Negative: [1] DOUBLE DOSE (an extra dose or lesser amount) of over-the-counter (OTC) drug AND [2] NO symptoms    Negative: [1] DOUBLE DOSE (an extra dose or lesser amount) of antibiotic drug AND [2] NO symptoms    Negative: Caller has medication question only, adult not sick, and triager answers question    Protocols used: MEDICATION QUESTION CALL-A-AH      "

## 2022-01-10 ENCOUNTER — MYC MEDICAL ADVICE (OUTPATIENT)
Dept: FAMILY MEDICINE | Facility: CLINIC | Age: 32
End: 2022-01-10
Payer: COMMERCIAL

## 2022-01-12 DIAGNOSIS — E03.9 HYPOTHYROIDISM, UNSPECIFIED TYPE: ICD-10-CM

## 2022-01-14 RX ORDER — LEVOTHYROXINE SODIUM 112 MCG
TABLET ORAL
Qty: 30 TABLET | Refills: 0 | Status: SHIPPED | OUTPATIENT
Start: 2022-01-14 | End: 2022-01-27

## 2022-01-14 NOTE — TELEPHONE ENCOUNTER
Refilled x1 month. Patient was advised back in May (when TSH was last checked and was out of range) to repeat in 4 weeks. Needs labs for further refills  Lea Avalos PA-C

## 2022-01-14 NOTE — TELEPHONE ENCOUNTER
Routing refill request to provider for review/approval because:  Labs out of range:  TSH: 5.37 on 9/2021    Inder Pardo RN

## 2022-01-17 ENCOUNTER — MYC MEDICAL ADVICE (OUTPATIENT)
Dept: FAMILY MEDICINE | Facility: CLINIC | Age: 32
End: 2022-01-17
Payer: COMMERCIAL

## 2022-01-17 DIAGNOSIS — E03.9 HYPOTHYROIDISM: Primary | ICD-10-CM

## 2022-01-17 DIAGNOSIS — E03.9 HYPOTHYROIDISM, UNSPECIFIED TYPE: ICD-10-CM

## 2022-01-17 NOTE — TELEPHONE ENCOUNTER
See 1/17/22 [facility] where patient confirms she understood instructions.  Poncho Harris RN

## 2022-01-20 ENCOUNTER — LAB (OUTPATIENT)
Dept: LAB | Facility: CLINIC | Age: 32
End: 2022-01-20
Payer: COMMERCIAL

## 2022-01-20 ENCOUNTER — TELEPHONE (OUTPATIENT)
Dept: FAMILY MEDICINE | Facility: CLINIC | Age: 32
End: 2022-01-20

## 2022-01-20 ENCOUNTER — E-VISIT (OUTPATIENT)
Dept: URGENT CARE | Facility: CLINIC | Age: 32
End: 2022-01-20

## 2022-01-20 DIAGNOSIS — R31.29 MICROSCOPIC HEMATURIA: ICD-10-CM

## 2022-01-20 DIAGNOSIS — E03.9 HYPOTHYROIDISM: ICD-10-CM

## 2022-01-20 DIAGNOSIS — M45.9 ANKYLOSING SPONDYLITIS (H): ICD-10-CM

## 2022-01-20 DIAGNOSIS — J01.90 ACUTE SINUSITIS WITH SYMPTOMS > 10 DAYS: Primary | ICD-10-CM

## 2022-01-20 LAB
ALBUMIN SERPL-MCNC: 3.3 G/DL (ref 3.4–5)
ALP SERPL-CCNC: 59 U/L (ref 40–150)
ALT SERPL W P-5'-P-CCNC: 16 U/L (ref 0–50)
ANION GAP SERPL CALCULATED.3IONS-SCNC: 4 MMOL/L (ref 3–14)
AST SERPL W P-5'-P-CCNC: 8 U/L (ref 0–45)
BASOPHILS # BLD AUTO: 0 10E3/UL (ref 0–0.2)
BASOPHILS NFR BLD AUTO: 1 %
BILIRUB SERPL-MCNC: 0.3 MG/DL (ref 0.2–1.3)
BUN SERPL-MCNC: 11 MG/DL (ref 7–30)
CALCIUM SERPL-MCNC: 8.9 MG/DL (ref 8.5–10.1)
CHLORIDE BLD-SCNC: 105 MMOL/L (ref 94–109)
CO2 SERPL-SCNC: 30 MMOL/L (ref 20–32)
CREAT SERPL-MCNC: 0.79 MG/DL (ref 0.52–1.04)
CRP SERPL-MCNC: 5.9 MG/L (ref 0–8)
EOSINOPHIL # BLD AUTO: 0.3 10E3/UL (ref 0–0.7)
EOSINOPHIL NFR BLD AUTO: 5 %
ERYTHROCYTE [DISTWIDTH] IN BLOOD BY AUTOMATED COUNT: 12.7 % (ref 10–15)
GFR SERPL CREATININE-BSD FRML MDRD: >90 ML/MIN/1.73M2
GLUCOSE BLD-MCNC: 97 MG/DL (ref 70–99)
HBV CORE AB SERPL QL IA: NONREACTIVE
HBV SURFACE AB SERPL IA-ACNC: 2.11 M[IU]/ML
HBV SURFACE AG SERPL QL IA: NONREACTIVE
HCT VFR BLD AUTO: 38.1 % (ref 35–47)
HCV AB SERPL QL IA: NONREACTIVE
HGB BLD-MCNC: 12.6 G/DL (ref 11.7–15.7)
LYMPHOCYTES # BLD AUTO: 1.3 10E3/UL (ref 0.8–5.3)
LYMPHOCYTES NFR BLD AUTO: 19 %
MCH RBC QN AUTO: 30.8 PG (ref 26.5–33)
MCHC RBC AUTO-ENTMCNC: 33.1 G/DL (ref 31.5–36.5)
MCV RBC AUTO: 93 FL (ref 78–100)
MONOCYTES # BLD AUTO: 0.4 10E3/UL (ref 0–1.3)
MONOCYTES NFR BLD AUTO: 6 %
NEUTROPHILS # BLD AUTO: 4.5 10E3/UL (ref 1.6–8.3)
NEUTROPHILS NFR BLD AUTO: 70 %
PLATELET # BLD AUTO: 236 10E3/UL (ref 150–450)
POTASSIUM BLD-SCNC: 3.4 MMOL/L (ref 3.4–5.3)
PROT SERPL-MCNC: 6.7 G/DL (ref 6.8–8.8)
RBC # BLD AUTO: 4.09 10E6/UL (ref 3.8–5.2)
SODIUM SERPL-SCNC: 139 MMOL/L (ref 133–144)
TSH SERPL DL<=0.005 MIU/L-ACNC: 3.01 MU/L (ref 0.4–4)
WBC # BLD AUTO: 6.5 10E3/UL (ref 4–11)

## 2022-01-20 PROCEDURE — 86704 HEP B CORE ANTIBODY TOTAL: CPT

## 2022-01-20 PROCEDURE — 87340 HEPATITIS B SURFACE AG IA: CPT

## 2022-01-20 PROCEDURE — 80050 GENERAL HEALTH PANEL: CPT

## 2022-01-20 PROCEDURE — 36415 COLL VENOUS BLD VENIPUNCTURE: CPT

## 2022-01-20 PROCEDURE — 86803 HEPATITIS C AB TEST: CPT

## 2022-01-20 PROCEDURE — 86706 HEP B SURFACE ANTIBODY: CPT

## 2022-01-20 PROCEDURE — 86140 C-REACTIVE PROTEIN: CPT

## 2022-01-20 RX ORDER — AMOXICILLIN 875 MG
875 TABLET ORAL 2 TIMES DAILY
Qty: 20 TABLET | Refills: 0 | Status: SHIPPED | OUTPATIENT
Start: 2022-01-20 | End: 2022-01-30

## 2022-01-20 NOTE — TELEPHONE ENCOUNTER
Reason for Call:  Other call back    Detailed comments: Patient called and says she did a evisit today and does not feel her concerns were listened to and before she could respond the visit was closed.  Patient asking for a call back from the RN and passed to Lea.    Phone Number Patient can be reached at: Cell number on file:    Telephone Information:   Mobile [phone]       Best Time:     Can we leave a detailed message on this number? YES    Call taken on 1/20/2022 at 12:48 PM by Mikayla Almanza       Taking sips of fluids with good toleration.

## 2022-01-20 NOTE — TELEPHONE ENCOUNTER
Call placed to patient.  Left voicemail message with call back number clinic RN.  Kenya Blas RN

## 2022-01-20 NOTE — TELEPHONE ENCOUNTER
"Call placed to patient     Patient reports she had an E-visit completed today and felt like her concerns were not addressed    Patient reports she was prescribed an antibiotic for a sinus infection through E-Visit  Patient concerned as when she takes antibiotics she develops \"major vaginal issues\" and would prefer not to take an antibiotic if possible    Patient reports symptoms started on December 29th a few days after finding out she was pregnant   Patient reports nasal symptoms have persisted / slightly worsened from onset  Reports thick / yellow nasal drainage  States she has a lot of post nasal drip which is causing her to feel \"gaggy\"  Patient had not been experiencing nasal congestion up until this morning - states she showered and then congestion resolved  Has been using OTC Benadryl and Flonase - reports some relief    Denies fevers  Denies recent Covid-19 exposure  Denies recent ill contacts    Encouraged patient to use a netti pot or nasal irrigation     Patient wondering if Ariana had any other recommendations other than antibiotic therapy for symptoms    Inder Pardo RN    "

## 2022-01-20 NOTE — PATIENT INSTRUCTIONS
Dear Wilma Palacios    After reviewing your responses, I've been able to diagnose you with?a sinus infection caused by bacteria.?     Based on your responses and diagnosis, I have prescribed amox to treat your symptoms. I have sent this to your pharmacy.?     It is also important to stay well hydrated, get lots of rest and take over-the-counter decongestants,?tylenol?or ibuprofen if you?are able to?take those medications per your primary care provider to help relieve discomfort.?     It is important that you take?all of?your prescribed medication even if your symptoms are improving after a few doses.? Taking?all of?your medicine helps prevent the symptoms from returning.?     If your symptoms worsen, you develop severe headache, vomiting, high fever (>102), or are not improving in 7 days, please contact your primary care provider for an appointment or visit any of our convenient Walk-in Care or Urgent Care Centers to be seen which can be found on our website?here.?     Thanks again for choosing?us?as your health care partner,?   ?  Aaron Prela PA-C?

## 2022-01-21 ENCOUNTER — HOSPITAL ENCOUNTER (OUTPATIENT)
Dept: ULTRASOUND IMAGING | Facility: CLINIC | Age: 32
Discharge: HOME | End: 2022-01-21
Attending: OBSTETRICS & GYNECOLOGY | Admitting: OBSTETRICS & GYNECOLOGY
Payer: COMMERCIAL

## 2022-01-21 DIAGNOSIS — O09.899 SUPERVISION OF OTHER HIGH RISK PREGNANCY, ANTEPARTUM: ICD-10-CM

## 2022-01-21 PROCEDURE — 76801 OB US < 14 WKS SINGLE FETUS: CPT

## 2022-01-21 NOTE — TELEPHONE ENCOUNTER
Call placed to patient  Relayed Ariana's message    Patient verbalized understanding  States nasal drainage has been ongoing for 4 weeks or so   Patient is wanting clarification on how long she should let symptoms continue / when she should be worried symptoms are not resolving     Patient also wondering if Claritin is safe to take in pregnancy     Will forward to Ariana to review    Inder Pardo RN

## 2022-01-21 NOTE — TELEPHONE ENCOUNTER
Unfortunately as she likely recalls from her first pregnancy, there is not a lot that can be taken that is safe in pregnancy  Pushing fluids, tylenol, neti pot or saline rinses, humidified air, nasal sprays and lots of rest.     IN majority of cases, antibiotics are not necessary unless symptoms persist, progressively worsen and are accompanied by fevers, chills or other new/differents symptoms.     Lea Avalos PA-C

## 2022-01-21 NOTE — TELEPHONE ENCOUNTER
Claritin is fine to use.   Nasal drainage alone (no other major symptoms) can persist for weeks and as long as that is the only symptom, then I wouldn't try to treat more aggressively with antibiotics.   Lea

## 2022-01-21 NOTE — TELEPHONE ENCOUNTER
Call placed to patient  Relayed Ariana's message    Patient verbalized understanding  No further questions/concerns    Inder Pardo RN

## 2022-01-22 ENCOUNTER — HEALTH MAINTENANCE LETTER (OUTPATIENT)
Age: 32
End: 2022-01-22

## 2022-01-22 ENCOUNTER — NURSE TRIAGE (OUTPATIENT)
Dept: NURSING | Facility: CLINIC | Age: 32
End: 2022-01-22
Payer: COMMERCIAL

## 2022-01-22 PROBLEM — O20.8 SUBCHORIONIC HEMORRHAGE IN FIRST TRIMESTER: Status: ACTIVE | Noted: 2022-01-22

## 2022-01-22 NOTE — TELEPHONE ENCOUNTER
Triage Call:     Pt ate seafood leftovers and is now having diarrhea today  Diarrhea 5x today  Pt is pregnant    No abdominal pain  No fever  No vomiting  No signs of dehydration yet    Pt reports that she takes immunosuppressive medication.     Disposition: See physician within 24 hours. Pt was given care advice for her sx and she will go to the nearest [facility] [place] [place] tomorrow if needed.    Frieda Diaz RN  [facility] Nurse Advisor 2:03 PM 1/22/2022    Reason for Disposition    Weak immune system (e.g., HIV positive, cancer chemo, splenectomy, organ transplant, chronic steroids)    Additional Information    Negative: Shock suspected (e.g., cold/pale/clammy skin, too weak to stand, low BP, rapid pulse)    Negative: Difficult to awaken or acting confused (e.g., disoriented, slurred speech)    Negative: Sounds like a life-threatening emergency to the triager    Negative: Vomiting also present and worse than the diarrhea    Negative: [1] Blood in stool AND [2] without diarrhea    Negative: Diarrhea in a cancer patient who is currently (or recently) receiving chemotherapy or radiation therapy, or cancer patient who has metastatic or end-stage cancer and is receiving palliative care    Negative: [1] SEVERE abdominal pain (e.g., excruciating) AND [2] present > 1 hour    Negative: [1] SEVERE abdominal pain AND [2] age > 60    Negative: [1] Blood in the stool AND [2] moderate or large amount of blood    Negative: Black or tarry bowel movements  (Exception: chronic-unchanged  black-grey bowel movements AND is taking iron pills or Pepto-bismol)    Negative: [1] Drinking very little AND [2] dehydration suspected (e.g., no urine > 12 hours, very dry mouth, very lightheaded)    Negative: Patient sounds very sick or weak to the triager    Negative: [1] SEVERE diarrhea (e.g., 7 or more times / day more than normal) AND [2] age > 60 years    Negative: [1] Constant abdominal pain AND [2] present > 2 hours    Negative: [1] Fever > 103 F  (39.4 C) AND [2] not able to get the fever down using Fever Care Advice    Negative: [1] MODERATE diarrhea (e.g., 4-6 times / day more than normal) AND [2] present > 48 hours (2 days)    Negative: [1] SEVERE diarrhea (e.g., 7 or more times / day more than normal) AND [2] present > 24 hours (1 day)    Negative: [1] MODERATE diarrhea (e.g., 4-6 times / day more than normal) AND [2] age > 70 years    Negative: Fever > 101 F (38.3 C)    Negative: Fever present > 3 days (72 hours)    Negative: Abdominal pain  (Exception: Pain clears with each passage of diarrhea stool)    Negative: [1] Blood in the stool AND [2] small amount of blood   (Exception: only on toilet paper. Reason: diarrhea can cause rectal irritation with blood on wiping)    Negative: [1] Mucus or pus in stool AND [2] present > 2 days AND [3] diarrhea is more than mild    Negative: [1] Recent antibiotic therapy (i.e., within last 2 months) AND [2] diarrhea present > 3 days since antibiotic was stopped    Negative: [1] Recent hospitalization AND [2] diarrhea present > 3 days    Protocols used: DIARRHEA-A-AH    COVID 19 Nurse Triage Plan/Patient Instructions    Please be aware that novel coronavirus (COVID-19) may be circulating in the community. If you develop symptoms such as fever, cough, or SOB or if you have concerns about the presence of another infection including coronavirus (COVID-19), please contact your health care provider or visit [email or web address].     Disposition/Instructions    In-Person Visit with provider recommended. Reference Visit Selection Guide.    Thank you for taking steps to prevent the spread of this virus.  o Limit your contact with others.  o Wear a simple mask to cover your cough.  o Wash your hands well and often.    Resources    [facility]: About COVID-19: [email or web address]    CDC: What to Do If You're Sick: www.cdc.gov/coronavirus/2019-ncov/about/steps-when-sick.html    CDC: Ending Home  Isolation: www.cdc.gov/coronavirus/2019-ncov/hcp/disposition-in-home-patients.html     CDC: Caring for Someone: www.cdc.gov/coronavirus/2019-ncov/if-you-are-sick/care-for-someone.html     [facility]: Interim Guidance for Hospital Discharge to Home: [email or web address]/coronavirus/hcp/hospdischarge.pdf    [facility] clinical trials (COVID-19 research studies): clinicalaffairs.[facility]-clinical-trials     Below are the COVID-19 hotlines at the [place] [facility] of [facility] ([facility]). Interpreters are available.   o For health questions: Call 854-493-3910 or 1-535.876.5914 (7 a.m. to 7 p.m.)  o For questions about schools and childcare: Call 813-992-5862 or 1-318.348.9689 (7 a.m. to 7 p.m.)

## 2022-01-26 ENCOUNTER — PRENATAL OFFICE VISIT (OUTPATIENT)
Dept: OBGYN | Facility: CLINIC | Age: 32
End: 2022-01-26
Payer: COMMERCIAL

## 2022-01-26 VITALS
SYSTOLIC BLOOD PRESSURE: 101 MMHG | DIASTOLIC BLOOD PRESSURE: 71 MMHG | HEART RATE: 73 BPM | WEIGHT: 210.3 LBS | BODY MASS INDEX: 32.06 KG/M2 | OXYGEN SATURATION: 98 %

## 2022-01-26 DIAGNOSIS — O21.0 HYPEREMESIS GRAVIDARUM: ICD-10-CM

## 2022-01-26 DIAGNOSIS — O09.91 SUPERVISION OF HIGH RISK PREGNANCY IN FIRST TRIMESTER: Primary | ICD-10-CM

## 2022-01-26 PROCEDURE — 87591 N.GONORRHOEAE DNA AMP PROB: CPT | Performed by: OBSTETRICS & GYNECOLOGY

## 2022-01-26 PROCEDURE — 99203 OFFICE O/P NEW LOW 30 MIN: CPT | Performed by: OBSTETRICS & GYNECOLOGY

## 2022-01-26 PROCEDURE — 87491 CHLMYD TRACH DNA AMP PROBE: CPT | Performed by: OBSTETRICS & GYNECOLOGY

## 2022-01-26 RX ORDER — ONDANSETRON 4 MG/1
4 TABLET, FILM COATED ORAL EVERY 6 HOURS PRN
Qty: 30 TABLET | Refills: 1 | Status: SHIPPED | OUTPATIENT
Start: 2022-01-26 | End: 2023-09-18

## 2022-01-26 ASSESSMENT — PATIENT HEALTH QUESTIONNAIRE - PHQ9: SUM OF ALL RESPONSES TO PHQ QUESTIONS 1-9: 7

## 2022-01-26 NOTE — PROGRESS NOTES
Wilma is a 31 year old female, , EDC 2022 per early OB US, who is here today for her first OB visit at 7 6/7 weeks gestation.  She has had a positive home pregnancy test.  She has not felt fetal movement yet and denies any vaginal spotting or uterine cramping.  The results of her recent OB US from 2022 were reviewed with the pt and her concerns regarding the moderate subchorionic hemorrhage were addressed.  She is to continue pelvic rest, including no intercourse, until this resolves.  She admits to nausea each morning so would like a prescription for an anti-emetic sent to her pharmacy.  She denies any emesis episodes.  She requests a blood test to determine the baby's gender so will be referred to the genetic counselor to discuss her testing options.  The pt has a hx of Ankylosing spondylitis but denies any issues during her last pregnancy.  She c/o ongoing sinus symptoms so was referred to FP for this issue.  She is taking a PNV daily po and her social hx is negative for nicotine, etoh, and illicit drug abuse.       ROS: Ten point review of systems was reviewed and negative except the above.    Gyn Hx:      Past Medical History:   Diagnosis Date     Ankylosing spondylitis with multisystem involvement (H) 2012     Thyroid disease      Past Surgical History:   Procedure Laterality Date      SECTION  2019     COLONOSCOPY  2015     COLONOSCOPY       EXTRACTION(S) DENTAL  2009     Patient Active Problem List   Diagnosis     Ankylosing spondylitis of multiple sites in spine (H)     Allergic rhinitis     Anxiety     Chronic chest wall pain     HLA-B27 spondyloarthropathy     Hypothyroidism     Rheumatoid arthritis (H)     Cervical cancer screening     Clinical diagnosis of COVID-19     Subchorionic hemorrhage in first trimester       ALL/Meds: Her medication and allergy histories were reviewed and are documented in their appropriate chart areas.    SH: Reviewed and documented in the  "appropriate area of the chart.    FH: Her family history was reviewed and documented in its appropriate chart area.    PE: /71 (BP Location: Right arm, Patient Position: Sitting, Cuff Size: Adult Large)   Pulse 73   Wt 95.4 kg (210 lb 4.8 oz)   LMP 12/03/2021   SpO2 98%   BMI 32.06 kg/m    Body mass index is 32.06 kg/m .    Urine HCG was +  Hrt - RRR without murmur  Lungs - CTAB  Neck - supple without palpable mass  Breasts - no palpable mass or nipple discharge noted  Abd - soft, nontender, gravid, unable to hear fhts with the doppler   Pelvic - normal external female genitalia, normal vaginal mucosa, closed cervix without motion tenderness, gravid nontender uterus with size consistent with dates, no adnexal mass or tenderness  Ext - negative    A/P:   - I discussed with her nutrition and medication concerns related to pregnancy.  We discussed folic acid supplimentation.  We reviewed prenatal care.  She is given the opportunity to ask questions and have them answered.  Refer to [facility] for genetic counseling to discuss her genetic testing options.  She requests the \"gender test.\"  Also, she has a hx of ankylosing spondylitis.  A script for Zofran was sent to her pharmacy due to her daily nausea issue.  She declines a flu vaccine.  Refer to FP for ongoing sinus symptoms.  Sent to lab for prenatal labwork today.  She was advised to continue taking a PNV daily po.    30 minutes were spent face to face with the patient today discussing her history, diagnosis, and follow-up plan as noted above.  Over 50% of the visit was spent in counseling and coordination of care.    Total Visit Time: 30 minutes.    Orders Placed This Encounter   Procedures     CBC with platelets     Treponema Abs w Reflex to RPR and Titer     HIV Antigen Antibody Combo     Hepatitis B surface antigen     Hepatitis C antibody     Rubella Antibody IgG     Perinatology Referral     ABO/Rh type and screen     Evelina Astudillo, DO  FACOG, FACS  "

## 2022-01-26 NOTE — PATIENT INSTRUCTIONS
If you have any questions regarding your visit, Please contact your care team.    [facility]: [phone]      [facility] CLINIC HOURS TELEPHONE NUMBER   Evelina Astudillo DO.    [facility] [place] -  [place] -       Flor RN  Margaret, RN  Camilla, RN     Monday, Wednesday, Thursday and Friday, [place]  8:30a.m-5:00 p.m   [facility]  [address] [place] [address]  [phone] ask for [facility]    Imaging [phone]       Urgent Care locations:    [facility] Saturday and Sunday   9 am - 5 pm    Monday-Friday   12 pm - 8 pm  Saturday and Sunday   9 am - 5 pm   [phone]     [facility] Labor and Delivery:  [phone]    If you need a medication refill, please contact your pharmacy. Please allow 3 business days for your refill to be completed.  As always, Thank you for trusting us with your healthcare needs!

## 2022-01-27 ENCOUNTER — LAB (OUTPATIENT)
Dept: LAB | Facility: CLINIC | Age: 32
End: 2022-01-27
Payer: COMMERCIAL

## 2022-01-27 DIAGNOSIS — O09.91 SUPERVISION OF HIGH RISK PREGNANCY IN FIRST TRIMESTER: ICD-10-CM

## 2022-01-27 LAB
ABO/RH(D): NORMAL
ANTIBODY SCREEN: NEGATIVE
C TRACH DNA SPEC QL NAA+PROBE: NEGATIVE
ERYTHROCYTE [DISTWIDTH] IN BLOOD BY AUTOMATED COUNT: 12.6 % (ref 10–15)
HCT VFR BLD AUTO: 39.1 % (ref 35–47)
HGB BLD-MCNC: 13.1 G/DL (ref 11.7–15.7)
MCH RBC QN AUTO: 31.1 PG (ref 26.5–33)
MCHC RBC AUTO-ENTMCNC: 33.5 G/DL (ref 31.5–36.5)
MCV RBC AUTO: 93 FL (ref 78–100)
N GONORRHOEA DNA SPEC QL NAA+PROBE: NEGATIVE
PLATELET # BLD AUTO: 269 10E3/UL (ref 150–450)
RBC # BLD AUTO: 4.21 10E6/UL (ref 3.8–5.2)
SPECIMEN EXPIRATION DATE: NORMAL
WBC # BLD AUTO: 7 10E3/UL (ref 4–11)

## 2022-01-27 PROCEDURE — 87389 HIV-1 AG W/HIV-1&-2 AB AG IA: CPT

## 2022-01-27 PROCEDURE — 86901 BLOOD TYPING SEROLOGIC RH(D): CPT

## 2022-01-27 PROCEDURE — 86850 RBC ANTIBODY SCREEN: CPT

## 2022-01-27 PROCEDURE — 86780 TREPONEMA PALLIDUM: CPT

## 2022-01-27 PROCEDURE — 86900 BLOOD TYPING SEROLOGIC ABO: CPT

## 2022-01-27 PROCEDURE — 87340 HEPATITIS B SURFACE AG IA: CPT

## 2022-01-27 PROCEDURE — 86762 RUBELLA ANTIBODY: CPT

## 2022-01-27 PROCEDURE — 36415 COLL VENOUS BLD VENIPUNCTURE: CPT

## 2022-01-27 PROCEDURE — 86803 HEPATITIS C AB TEST: CPT

## 2022-01-27 PROCEDURE — 85027 COMPLETE CBC AUTOMATED: CPT

## 2022-01-27 PROCEDURE — 87086 URINE CULTURE/COLONY COUNT: CPT

## 2022-01-27 RX ORDER — LEVOTHYROXINE SODIUM 112 MCG
TABLET ORAL
Qty: 90 TABLET | Refills: 1 | Status: SHIPPED | OUTPATIENT
Start: 2022-01-27 | End: 2024-07-30

## 2022-01-28 LAB
BACTERIA UR CULT: NORMAL
HBV SURFACE AG SERPL QL IA: NONREACTIVE
HCV AB SERPL QL IA: NONREACTIVE
HIV 1+2 AB+HIV1 P24 AG SERPL QL IA: NONREACTIVE
RUBV IGG SERPL QL IA: 1.41 INDEX
RUBV IGG SERPL QL IA: POSITIVE
T PALLIDUM AB SER QL: NONREACTIVE

## 2022-02-02 ENCOUNTER — NURSE TRIAGE (OUTPATIENT)
Dept: NURSING | Facility: CLINIC | Age: 32
End: 2022-02-02
Payer: COMMERCIAL

## 2022-02-03 NOTE — TELEPHONE ENCOUNTER
"Pregnant nine weeks and having bad abdominal pain.      \"I don't need a diagnosis or ER, I know this is constipation;  I just want to know if I can use an enema.\"    Abdominal pain \"9/10,\" but intermittent every several minutes and is relieved when \"gas moves.\"  Patient denies this has anything to do with her pregnancy.     Has tried the Miralax and symptoms got worse.  Per medication for pregnancy, can use glycerine suppository.      Disposition for symptoms is to see provider within 24 hours.  Patient verbalizes understanding and will see provider if symptom continues.    Eugenie Estrada RN  [facility]    COVID 19 Nurse Triage Plan/Patient Instructions    Please be aware that novel coronavirus (COVID-19) may be circulating in the community. If you develop symptoms such as fever, cough, or SOB or if you have concerns about the presence of another infection including coronavirus (COVID-19), please contact your health care provider or visit [email or web address].     Disposition/Instructions    In-Person Visit with provider recommended. Reference Visit Selection Guide.    Thank you for taking steps to prevent the spread of this virus.  o Limit your contact with others.  o Wear a simple mask to cover your cough.  o Wash your hands well and often.    Resources    [facility]: About COVID-19: [email or web address]    CDC: What to Do If You're Sick: www.cdc.gov/coronavirus/2019-ncov/about/steps-when-sick.html    CDC: Ending Home Isolation: www.cdc.gov/coronavirus/2019-ncov/hcp/disposition-in-home-patients.html     CDC: Caring for Someone: www.cdc.gov/coronavirus/2019-ncov/if-you-are-sick/care-for-someone.html     [facility]: Interim Guidance for Hospital Discharge to Home: www.[email or web address].[email or web address]/diseases/coronavirus/hcp/hospdischarge.pdf    [facility] clinical trials (COVID-19 research studies): [email or web address]     Below are the COVID-19 hotlines at " "the [facility] ([facility]). Interpreters are available.   o For health questions: Call 362-739-0182 or 1-985.146.3424 (7 a.m. to 7 p.m.)  o For questions about schools and childcare: Call 902-784-4927 or 1-661.345.6168 (7 a.m. to 7 p.m.)     Reason for Disposition    [1] Intermittent lower abdominal pain (e.g., cramping) AND [2] present > 24 hours    Additional Information    Negative: Leakage of fluid from vagina    Negative: [1] Pregnant 23 or more weeks AND [2] baby is moving less today (e.g., kick count < 5 in 1 hour or < 10 in 2 hours)    Negative: Severe rectal pain    Negative: Patient sounds very sick or weak to the triager    Negative: [1] Vomiting AND [2] abdomen looks much more swollen than usual    Negative: [1] Vomiting AND [2] contains bile (green color)    Negative: Last bowel movement (BM) > 4 days ago    Negative: Leaking stool    Negative: Passed out (i.e., lost consciousness, collapsed and was not responding)    Negative: Shock suspected (e.g., cold/pale/clammy skin, too weak to stand, low BP, rapid pulse)    Negative: Difficult to awaken or acting confused (e.g., disoriented, slurred speech)    Negative: Sounds like a life-threatening emergency to the triager    Negative: MODERATE-SEVERE abdominal pain (e.g., interferes with normal activities, awakens from sleep)    Negative: [1] SEVERE vaginal bleeding (e.g., soaking 2 pads per hour, large blood clots) AND [2] present 2 or more hours    Negative: [1] MODERATE vaginal bleeding (i.e., soaking 1 pad / hour; clots) AND [2] present > 6 hours    Negative: [1] MODERATE vaginal bleeding (i.e., soaking 1 pad / hour; clots) AND [2] pregnant > 12 weeks    Negative: Passed tissue (e.g., gray-white)    Negative: [1] Vomiting AND [2] contains red blood or black (\"coffee ground\") material  (Exception: few red streaks in vomit that only happened once)    Negative: Shoulder pain    Negative: Lightheadedness or dizziness (e.g., feels like passing " out)    Negative: Patient sounds very sick or weak to the triager    Negative: [1] Constant abdominal pain AND [2] present > 2 hours    Negative: Blood in urine (red, pink, or tea-colored)    Negative: Fever > 100.4 F (38.0 C)    Negative: White of the eyes have turned yellow (i.e., jaundice)    Protocols used: PREGNANCY - ABDOMINAL PAIN LESS THAN 20 WEEKS EGA-A-AH, PREGNANCY - CONSTIPATION-A-AH

## 2022-02-06 ENCOUNTER — HOSPITAL ENCOUNTER (EMERGENCY)
Facility: CLINIC | Age: 32
Discharge: HOME | End: 2022-02-06
Attending: EMERGENCY MEDICINE | Admitting: EMERGENCY MEDICINE
Payer: COMMERCIAL

## 2022-02-06 ENCOUNTER — NURSE TRIAGE (OUTPATIENT)
Dept: NURSING | Facility: CLINIC | Age: 32
End: 2022-02-06
Payer: COMMERCIAL

## 2022-02-06 VITALS
HEART RATE: 78 BPM | OXYGEN SATURATION: 98 % | BODY MASS INDEX: 32.02 KG/M2 | RESPIRATION RATE: 18 BRPM | TEMPERATURE: 98.4 F | WEIGHT: 210 LBS | DIASTOLIC BLOOD PRESSURE: 62 MMHG | SYSTOLIC BLOOD PRESSURE: 122 MMHG

## 2022-02-06 DIAGNOSIS — R11.2 NON-INTRACTABLE VOMITING WITH NAUSEA, UNSPECIFIED VOMITING TYPE: ICD-10-CM

## 2022-02-06 DIAGNOSIS — Z3A.10 10 WEEKS GESTATION OF PREGNANCY: ICD-10-CM

## 2022-02-06 LAB
ALBUMIN SERPL-MCNC: 3.4 G/DL (ref 3.4–5)
ALBUMIN UR-MCNC: 100 MG/DL
ALP SERPL-CCNC: 57 U/L (ref 40–150)
ALT SERPL W P-5'-P-CCNC: 20 U/L (ref 0–50)
ANION GAP SERPL CALCULATED.3IONS-SCNC: 5 MMOL/L (ref 3–14)
APPEARANCE UR: ABNORMAL
AST SERPL W P-5'-P-CCNC: 9 U/L (ref 0–45)
BACTERIA #/AREA URNS HPF: ABNORMAL /HPF
BASOPHILS # BLD AUTO: 0 10E3/UL (ref 0–0.2)
BASOPHILS NFR BLD AUTO: 0 %
BILIRUB SERPL-MCNC: 0.6 MG/DL (ref 0.2–1.3)
BILIRUB UR QL STRIP: NEGATIVE
BUN SERPL-MCNC: 15 MG/DL (ref 7–30)
CALCIUM SERPL-MCNC: 9 MG/DL (ref 8.5–10.1)
CHLORIDE BLD-SCNC: 107 MMOL/L (ref 94–109)
CO2 SERPL-SCNC: 25 MMOL/L (ref 20–32)
COLOR UR AUTO: YELLOW
CREAT SERPL-MCNC: 0.8 MG/DL (ref 0.52–1.04)
EOSINOPHIL # BLD AUTO: 0 10E3/UL (ref 0–0.7)
EOSINOPHIL NFR BLD AUTO: 0 %
ERYTHROCYTE [DISTWIDTH] IN BLOOD BY AUTOMATED COUNT: 12.6 % (ref 10–15)
FLUAV RNA SPEC QL NAA+PROBE: NEGATIVE
FLUBV RNA RESP QL NAA+PROBE: NEGATIVE
GFR SERPL CREATININE-BSD FRML MDRD: >90 ML/MIN/1.73M2
GLUCOSE BLD-MCNC: 119 MG/DL (ref 70–99)
GLUCOSE UR STRIP-MCNC: NEGATIVE MG/DL
HCT VFR BLD AUTO: 38 % (ref 35–47)
HGB BLD-MCNC: 13.1 G/DL (ref 11.7–15.7)
HGB UR QL STRIP: ABNORMAL
HYALINE CASTS: 6 /LPF
IMM GRANULOCYTES # BLD: 0 10E3/UL
IMM GRANULOCYTES NFR BLD: 0 %
KETONES UR STRIP-MCNC: 40 MG/DL
LEUKOCYTE ESTERASE UR QL STRIP: NEGATIVE
LIPASE SERPL-CCNC: 115 U/L (ref 73–393)
LYMPHOCYTES # BLD AUTO: 0.4 10E3/UL (ref 0.8–5.3)
LYMPHOCYTES NFR BLD AUTO: 4 %
MCH RBC QN AUTO: 31.6 PG (ref 26.5–33)
MCHC RBC AUTO-ENTMCNC: 34.5 G/DL (ref 31.5–36.5)
MCV RBC AUTO: 92 FL (ref 78–100)
MONOCYTES # BLD AUTO: 0.3 10E3/UL (ref 0–1.3)
MONOCYTES NFR BLD AUTO: 3 %
MUCOUS THREADS #/AREA URNS LPF: PRESENT /LPF
NEUTROPHILS # BLD AUTO: 8.5 10E3/UL (ref 1.6–8.3)
NEUTROPHILS NFR BLD AUTO: 93 %
NITRATE UR QL: NEGATIVE
NRBC # BLD AUTO: 0 10E3/UL
NRBC BLD AUTO-RTO: 0 /100
PH UR STRIP: 7 [PH] (ref 5–7)
PLATELET # BLD AUTO: 231 10E3/UL (ref 150–450)
POTASSIUM BLD-SCNC: 4 MMOL/L (ref 3.4–5.3)
PROT SERPL-MCNC: 7 G/DL (ref 6.8–8.8)
RBC # BLD AUTO: 4.14 10E6/UL (ref 3.8–5.2)
RBC URINE: 158 /HPF
SARS-COV-2 RNA RESP QL NAA+PROBE: NEGATIVE
SODIUM SERPL-SCNC: 137 MMOL/L (ref 133–144)
SP GR UR STRIP: 1.02 (ref 1–1.03)
SQUAMOUS EPITHELIAL: 13 /HPF
UROBILINOGEN UR STRIP-MCNC: NORMAL MG/DL
WBC # BLD AUTO: 9.3 10E3/UL (ref 4–11)
WBC URINE: 9 /HPF
YEAST #/AREA URNS HPF: ABNORMAL /HPF

## 2022-02-06 PROCEDURE — 99284 EMERGENCY DEPT VISIT MOD MDM: CPT | Performed by: EMERGENCY MEDICINE

## 2022-02-06 PROCEDURE — 81001 URINALYSIS AUTO W/SCOPE: CPT | Performed by: EMERGENCY MEDICINE

## 2022-02-06 PROCEDURE — C9803 HOPD COVID-19 SPEC COLLECT: HCPCS | Performed by: EMERGENCY MEDICINE

## 2022-02-06 PROCEDURE — 87636 SARSCOV2 & INF A&B AMP PRB: CPT | Performed by: EMERGENCY MEDICINE

## 2022-02-06 PROCEDURE — 250N000011 HC RX IP 250 OP 636: Performed by: EMERGENCY MEDICINE

## 2022-02-06 PROCEDURE — 85025 COMPLETE CBC W/AUTO DIFF WBC: CPT | Performed by: EMERGENCY MEDICINE

## 2022-02-06 PROCEDURE — 96375 TX/PRO/DX INJ NEW DRUG ADDON: CPT | Performed by: EMERGENCY MEDICINE

## 2022-02-06 PROCEDURE — 82040 ASSAY OF SERUM ALBUMIN: CPT | Performed by: EMERGENCY MEDICINE

## 2022-02-06 PROCEDURE — 87086 URINE CULTURE/COLONY COUNT: CPT | Performed by: EMERGENCY MEDICINE

## 2022-02-06 PROCEDURE — 258N000003 HC RX IP 258 OP 636: Performed by: EMERGENCY MEDICINE

## 2022-02-06 PROCEDURE — 99284 EMERGENCY DEPT VISIT MOD MDM: CPT | Mod: 25 | Performed by: EMERGENCY MEDICINE

## 2022-02-06 PROCEDURE — 83690 ASSAY OF LIPASE: CPT | Performed by: EMERGENCY MEDICINE

## 2022-02-06 PROCEDURE — 96374 THER/PROPH/DIAG INJ IV PUSH: CPT | Performed by: EMERGENCY MEDICINE

## 2022-02-06 PROCEDURE — 250N000013 HC RX MED GY IP 250 OP 250 PS 637: Performed by: EMERGENCY MEDICINE

## 2022-02-06 PROCEDURE — 80053 COMPREHEN METABOLIC PANEL: CPT | Performed by: EMERGENCY MEDICINE

## 2022-02-06 PROCEDURE — 96361 HYDRATE IV INFUSION ADD-ON: CPT | Performed by: EMERGENCY MEDICINE

## 2022-02-06 PROCEDURE — 36415 COLL VENOUS BLD VENIPUNCTURE: CPT | Performed by: EMERGENCY MEDICINE

## 2022-02-06 RX ORDER — METOCLOPRAMIDE HYDROCHLORIDE 5 MG/ML
10 INJECTION INTRAMUSCULAR; INTRAVENOUS ONCE
Status: COMPLETED | OUTPATIENT
Start: 2022-02-06 | End: 2022-02-06

## 2022-02-06 RX ORDER — ACETAMINOPHEN 500 MG
1000 TABLET ORAL ONCE
Status: COMPLETED | OUTPATIENT
Start: 2022-02-06 | End: 2022-02-06

## 2022-02-06 RX ORDER — ONDANSETRON 2 MG/ML
4 INJECTION INTRAMUSCULAR; INTRAVENOUS ONCE
Status: COMPLETED | OUTPATIENT
Start: 2022-02-06 | End: 2022-02-06

## 2022-02-06 RX ORDER — SODIUM CHLORIDE 9 MG/ML
1000 INJECTION, SOLUTION INTRAVENOUS CONTINUOUS
Status: DISCONTINUED | OUTPATIENT
Start: 2022-02-06 | End: 2022-02-06 | Stop reason: HOSPADM

## 2022-02-06 RX ADMIN — ACETAMINOPHEN 1000 MG: 500 TABLET, FILM COATED ORAL at 13:25

## 2022-02-06 RX ADMIN — SODIUM CHLORIDE 500 ML: 9 INJECTION, SOLUTION INTRAVENOUS at 11:46

## 2022-02-06 RX ADMIN — SODIUM CHLORIDE 1000 ML: 9 INJECTION, SOLUTION INTRAVENOUS at 10:23

## 2022-02-06 RX ADMIN — ONDANSETRON 4 MG: 2 INJECTION INTRAMUSCULAR; INTRAVENOUS at 08:27

## 2022-02-06 RX ADMIN — SODIUM CHLORIDE 1000 ML: 9 INJECTION, SOLUTION INTRAVENOUS at 08:27

## 2022-02-06 RX ADMIN — METOCLOPRAMIDE HYDROCHLORIDE 10 MG: 5 INJECTION INTRAMUSCULAR; INTRAVENOUS at 10:24

## 2022-02-06 NOTE — ED TRIAGE NOTES
9 wks pregnant here with nausea and vomiting for 9 hrs. Lower abdominal cramping started when pt was driving here. Denies leaking of fluid and or bleeding. Pt took zofran with no relief @ 0330.

## 2022-02-06 NOTE — ED PROVIDER NOTES
History     Chief Complaint   Patient presents with     Nausea & Vomiting     9 wks pregnant vomiting for 9 hrs. Daughter sick two days ago with stomach flu     Abdominal Pain     cramps started when pt was driving here     HPI  Wilma Palacios is a 31 year old female with past medical history significant for ankylosing spondylitis hypothyroidism and 10-week pregnancy who presents emergency department complaining of nausea and vomiting.  Patient states this occurred since about 10 to or 11:00 yesterday evening.  She has had numerous episodes of vomiting bilious material.  She states she has not noticed any blood in the vomit.  She has not had any diarrhea.  She denies any fevers chills headache visual changes neck pain chest pain shortness of breath.  She has had some mild lower abdominal cramping which is not present at the time no back pain she has not had bowel or bladder dysfunction and denies any focal numbness weakness any extremity is not any calf pain.  Patient's daughter has been sick lately with similar symptoms.  No vaginal bleeding or discharge.  No rash present.    Allergies:  Allergies   Allergen Reactions     Acetic Acid Anaphylaxis, Itching and Rash     Ascorbate Anaphylaxis     All Fruits.     Nuts Swelling and Nausea and Vomiting     Sulfa Drugs      Other Drug Allergy (See Comments) Hives and Rash     BLEACH       Problem List:    Patient Active Problem List    Diagnosis Date Noted     Subchorionic hemorrhage in first trimester 01/22/2022     Priority: Medium     Clinical diagnosis of COVID-19 05/03/2021     Priority: Medium     Cervical cancer screening 11/25/2020     Priority: Medium     2009 NIL pap  2013 NIL pap.  2014 NIL pap.  2017 NIL pap  11/19/20 NIL pap. Plan cotest in 1 year due bef 11/19/21 due to immunosuppression.   9/9/21 NIL Pap, Neg HPV. Plan cotest in 1 year. If NIL Pap, Neg HPV, then every 3 years.        Rheumatoid arthritis (H) 11/19/2020     Priority: Medium     Chronic  chest wall pain 2018     Priority: Medium     Overview:   Secondary to spondyloarthropathy        HLA-B27 spondyloarthropathy 2018     Priority: Medium     Overview:   2nd opinion confirmed at [place], diagnosed at [facility]        Ankylosing spondylitis of multiple sites in spine (H) 10/04/2018     Priority: Medium     Rheumatology note from [place] (1/15/19)  scanned into Epic.  Does not think there are any contraindications for vaginal birth.  Follow up with them 32-34 wks.  Pneumonia vaccine in 2nd or 3rd trimester.  Also follow up 6-8 weeks after delivery, sooner as needed       Anxiety 2017     Priority: Medium     Allergic rhinitis 2014     Priority: Medium     Hypothyroidism 2013     Priority: Medium        Past Medical History:    Past Medical History:   Diagnosis Date     Ankylosing spondylitis with multisystem involvement (H)      Thyroid disease        Past Surgical History:    Past Surgical History:   Procedure Laterality Date      SECTION  2019     COLONOSCOPY  2015     COLONOSCOPY       EXTRACTION(S) DENTAL  2009       Family History:    Family History   Problem Relation Age of Onset     Skin Cancer Maternal Grandfather      Rheumatoid Arthritis Maternal Grandfather      Heart Disease Paternal Grandmother        Social History:  Marital Status:   [2]  Social History     Tobacco Use     Smoking status: Former Smoker     Packs/day: 0.00     Years: 0.00     Pack years: 0.00     Quit date: 2013     Years since quittin.1     Smokeless tobacco: Never Used   Substance Use Topics     Alcohol use: Not Currently     Comment: rarely     Drug use: No        Medications:    budesonide (RINOCORT AQUA) 32 MCG/ACT nasal spray  clotrimazole (LOTRIMIN) 1 % vaginal cream  ENBREL MINI 50 MG/ML SOCT  fluticasone (FLONASE) 50 MCG/ACT nasal spray  loratadine (CLARITIN) 10 MG tablet  nystatin-triamcinolone (MYCOLOG II) 371498-8.1 UNIT/GM-% external cream  ondansetron  (ZOFRAN) 4 MG tablet  Prenatal MV & Min w/FA-DHA (PRENATAL GUMMIES PO)  SYNTHROID 112 MCG tablet          Review of Systems  All systems reviewed and other than pertinent positives and negatives in HPI all other systems are negative.  Physical Exam   BP: 106/78  Pulse: 93  Temp: 98.4  F (36.9  C)  Resp: 18  Weight: 95.3 kg (210 lb)  SpO2: 98 %      Physical Exam  Vitals and nursing note reviewed.   Constitutional:       General: She is not in acute distress.     Appearance: She is well-developed. She is not ill-appearing, toxic-appearing or diaphoretic.   HENT:      Head: Normocephalic and atraumatic.      Nose: Nose normal.   Eyes:      Conjunctiva/sclera: Conjunctivae normal.   Neck:      Vascular: No carotid bruit.   Cardiovascular:      Rate and Rhythm: Normal rate and regular rhythm.      Pulses: Normal pulses.      Heart sounds: Normal heart sounds. No murmur heard.      Pulmonary:      Effort: Pulmonary effort is normal.      Breath sounds: Normal breath sounds.   Abdominal:      General: Abdomen is flat. There is no distension.      Palpations: Abdomen is soft.      Tenderness: There is no abdominal tenderness. There is no right CVA tenderness or left CVA tenderness.      Comments: Bowel sounds slightly hyperactive at this time.   Musculoskeletal:         General: No swelling or tenderness. Normal range of motion.      Cervical back: Normal range of motion and neck supple. No rigidity or tenderness.      Right lower leg: No edema.      Left lower leg: No edema.   Skin:     General: Skin is warm and dry.      Capillary Refill: Capillary refill takes less than 2 seconds.      Findings: No rash.   Neurological:      General: No focal deficit present.      Mental Status: She is alert and oriented to person, place, and time.      Motor: No weakness.      Coordination: Coordination normal.   Psychiatric:         Mood and Affect: Mood normal.         ED Course                 Procedures              Critical Care  time:  none               Results for orders placed or performed during the hospital encounter of 02/06/22 (from the past 24 hour(s))   Symptomatic; Yes; 2/5/2022 Influenza A/B & SARS-CoV2 (COVID-19) Virus PCR Multiplex Nasopharyngeal    Specimen: Nasopharyngeal; Swab   Result Value Ref Range    Influenza A PCR Negative Negative    Influenza B PCR Negative Negative    SARS CoV2 PCR Negative Negative    Narrative    Testing was performed using the roni SARS-CoV-2 & Influenza A/B Assay on the roni Yara System. This test should be ordered for the detection of SARS-CoV-2 and influenza viruses in individuals who meet clinical and/or epidemiological criteria. Test performance is unknown in asymptomatic patients. This test is for in vitro diagnostic use under the FDA EUA for laboratories certified under CLIA to perform moderate and/or high complexity testing. This test has not been FDA cleared or approved. A negative result does not rule out the presence of PCR inhibitors in the specimen or target RNA in concentration below the limit of detection for the assay. If only one viral target is positive but coinfection with multiple targets is suspected, the sample should be re-tested with another FDA cleared, approved or authorized test, if coinfection would change clinical management. [facility] are certified under the Clinical Laboratory Improvement Amendments of 1988 (CLIA-88) as  qualified to perform moderate and/or high complexity laboratory testing.       Medications   0.9% sodium chloride BOLUS (has no administration in time range)   sodium chloride 0.9% infusion (has no administration in time range)   ondansetron (ZOFRAN) injection 4 mg (has no administration in time range)       Assessments & Plan (with Medical Decision Making) records were reviewed.  Patient was given IV fluids Zofran for her nausea and vomiting.  Labs were obtained.  Patient's white count was 9.3 hemoglobin 13.1.  Platelet count  was 231.  There was a mild left shift.  Comprehensive metabolic panel without significant abnormality.  Lipase within normal limits.  Urine analysis with large blood few bacteria and 58 RBCs 9 WBCs 13 squamous cells.  40 ketones were present.  IV fluids were continued.  I did send off a urine culture.  Patient with continued nausea and was given a dose of Reglan.  Patient developed a mild headache and was given Tylenol.  She requested having a Covid test done and this was obtained.  It returned negative.  I discussed possible imaging studies with the patient including ultrasound but she states she is not having abdominal pain or any cramping or signs of contractions right now does not think this is necessary.  She states she has not had a decent bowel movement several days and thinks this might be contributing.  I discussed use of fiber agents initially and she will try this or try Dulcolax.  She feels comfortable going home right now and was requesting to go home.  She will return if symptoms worsen or new symptoms develop.  She will follow up with her OB this week for recheck.  If any abdominal pain fevers return of vomiting decreased urine output bleeding or vaginal discharge she will return for further evaluation and care.  Patient feels comfortable with this plan.     I have reviewed the nursing notes.    I have reviewed the findings, diagnosis, plan and need for follow up with the patient.       New Prescriptions    No medications on file       Final diagnoses:   Non-intractable vomiting with nausea, unspecified vomiting type   10 weeks gestation of pregnancy       2/6/2022   [facility] EMERGENCY DEPT     Kennedy Birmingham MD  02/07/22 [id number]

## 2022-02-06 NOTE — DISCHARGE INSTRUCTIONS
Return if symptoms worsen or new symptoms develop.  Follow-up with primary care physician next available.  Drink plenty of fluids.  Take Zofran as needed for nausea and gradually advance diet.  If worsening abdominal pain fever chills weakness altered mental status any abdominal pain or other symptoms present please return for further evaluation and care.

## 2022-02-06 NOTE — TELEPHONE ENCOUNTER
"Triage Call    Pt calling to report she has vomited 3-4 times in the past 4 hours. Says she is 9 weeks pregnant and has had some nausea and vomiting related to that, but also her daughter has had GI illness and doesn't know if she caught that.    Denies fever, abdominal pain, diarrhea, HA, eye pain, or lack of urine output.     Triaged to disposition of Home Care and Care Advice given per Pregnancy- Morning Sickness Guideline.    Shelia Méndez RN        Reason for Disposition    MILD-MODERATE vomiting  (e.g., 1-7 times / day)or nausea    Additional Information    Negative: Sounds like a life-threatening emergency to the triager    Negative: [1] Vaginal bleeding AND [2] pregnant < 20 weeks    Negative: [1] Abdominal pain AND [2] pregnant < 20 weeks    Negative: Headache is main symptom    Negative: [1] Vomiting AND [2] contains red blood or black (\"coffee ground\") material  (Exception: few red streaks in vomit that only happened once)    Negative: [1] Insulin-dependent diabetes (Type I) AND [2] glucose > 400 mg/dl (22 mmol/l)    Negative: Recent head injury (within last 3 days)    Negative: Recent abdominal injury (within last 3 days)    Negative: Severe pain in one eye    Negative: [1] SEVERE vomiting (e.g., 8 or more times / day) AND [2] present > 8 hours    Negative: [1] Drinking very little AND [2] dehydration suspected (e.g., no urine > 12 hours, very dry mouth, very lightheaded)    Negative: Patient sounds very sick or weak to the triager    Negative: [1] Unable to keep ANY liquids down (without vomiting) AND [2] present > 24 hours    Negative: Weight loss > 5 pounds (2.5 kg) in last 2 weeks    Negative: Vomiting an essential/prescribed medication  (Exception: prenatal vitamins)    Negative: Recently started on a new medication    Negative: [1] MODERATE vomiting (e.g., 2-7 times / day) AND [2] present > 3 days    Negative: Pain or burning with passing urine (urination)    Negative: Alcohol or drug abuse, " known or suspected    Negative: High-risk adult (e.g., diabetes, AIDS/HIV)    Negative: [1] MILD vomiting (e.g., 1-2 times / day) AND [2] present > 1 week AND [3] no improvement after using Morning Sickness Care Advice    Protocols used: PREGNANCY - MORNING SICKNESS (NAUSEA AND VOMITING OF PREGNANCY)-A-

## 2022-02-07 ENCOUNTER — TELEPHONE (OUTPATIENT)
Dept: FAMILY MEDICINE | Facility: CLINIC | Age: 32
End: 2022-02-07

## 2022-02-07 ENCOUNTER — HOSPITAL ENCOUNTER (EMERGENCY)
Facility: CLINIC | Age: 32
Discharge: LEFT BEFORE DISCHARGE | End: 2022-02-07
Payer: COMMERCIAL

## 2022-02-07 DIAGNOSIS — R31.9 HEMATURIA, UNSPECIFIED TYPE: ICD-10-CM

## 2022-02-07 LAB — BACTERIA UR CULT: NORMAL

## 2022-02-07 NOTE — TELEPHONE ENCOUNTER
"Patient was transferred from call center to speak with a nurse.    Yesterday was seen at ER for stomach flu symptoms. Please refer tot he report.  \"Today I feel great, no symptoms of stomach flue.  I stay hydrated.\"    Today she noted dark brown urine with red tint, denies pain.    Patient states that it is not a vaginal bleeding.    Advised patient to go back to the ER as she will need imaging that is not available at the urgent care.    Patient agreed with a plan    Greta Larson RN    "

## 2022-02-16 DIAGNOSIS — R31.9 HEMATURIA: Primary | ICD-10-CM

## 2022-02-18 ENCOUNTER — VIRTUAL VISIT (OUTPATIENT)
Dept: FAMILY MEDICINE | Facility: CLINIC | Age: 32
End: 2022-02-18
Payer: COMMERCIAL

## 2022-02-18 DIAGNOSIS — R09.82 PND (POST-NASAL DRIP): Primary | ICD-10-CM

## 2022-02-18 DIAGNOSIS — J45.20 MILD INTERMITTENT ASTHMA WITHOUT COMPLICATION: ICD-10-CM

## 2022-02-18 DIAGNOSIS — J06.9 VIRAL URI WITH COUGH: ICD-10-CM

## 2022-02-18 PROCEDURE — 99213 OFFICE O/P EST LOW 20 MIN: CPT | Mod: 95 | Performed by: FAMILY MEDICINE

## 2022-02-18 RX ORDER — ALBUTEROL SULFATE 90 UG/1
2 AEROSOL, METERED RESPIRATORY (INHALATION) EVERY 6 HOURS
Qty: 18 G | Refills: 1 | Status: SHIPPED | OUTPATIENT
Start: 2022-02-18 | End: 2022-08-05

## 2022-02-18 RX ORDER — VITAMIN A ACETATE, .BETA.-CAROTENE, ASCORBIC ACID, CHOLECALCIFEROL, .ALPHA.-TOCOPHEROL ACETATE, DL-, THIAMINE MONONITRATE, RIBOFLAVIN, NIACINAMIDE, PYRIDOXINE HYDROCHLORIDE, FOLIC ACID, CYANOCOBALAMIN, CALCIUM CARBONATE, FERROUS FUMARATE, ZINC OXIDE, AND CUPRIC OXIDE 2000; 2000; 120; 400; 22; 1.84; 3; 20; 10; 1; 12; 200; 27; 25; 2 [IU]/1; [IU]/1; MG/1; [IU]/1; MG/1; MG/1; MG/1; MG/1; MG/1; MG/1; UG/1; MG/1; MG/1; MG/1; MG/1
1 TABLET ORAL DAILY
COMMUNITY
End: 2024-04-15

## 2022-02-18 RX ORDER — FLUTICASONE PROPIONATE 110 UG/1
1-2 AEROSOL, METERED RESPIRATORY (INHALATION) 2 TIMES DAILY
Qty: 12 G | Refills: 1 | Status: SHIPPED | OUTPATIENT
Start: 2022-02-18 | End: 2022-02-18

## 2022-02-18 RX ORDER — FLUTICASONE PROPIONATE 110 UG/1
1-2 AEROSOL, METERED RESPIRATORY (INHALATION) 2 TIMES DAILY
Qty: 12 G | Refills: 1 | Status: SHIPPED | OUTPATIENT
Start: 2022-02-18

## 2022-02-18 NOTE — PATIENT INSTRUCTIONS
For your congestion, I recommend the followin. Use afrin nasal spray twice a day for up to 3 days. This helps open up the sinuses.  2. Use flonase nasal spray 1-2x a day during seasons when you are more prone to allergies.  3. Take zyrtec every day during allergy seasons. It works best when you take it regularly. You can take extra (such as 2-3 tablets per day) if needed, when your allergies are worse.  4. Cut back on your neti pot use    Use the albuterol inhaler up to 6 times a day - if you are needing it more than 6 times a day, you need to have an appointment to be evaluated by a provider    When you have colds, you should use a flovent inhaler just during the colds, to help prevent the severe coughs that happen with your asthma

## 2022-02-18 NOTE — PROGRESS NOTES
Wilma is a 31 year old who is being evaluated via a billable phone visit.      How would you like to obtain your AVS? Devonhart  If the video visit is dropped, the invitation should be resent by: Text to cell phone: [phone]  Will anyone else be joining your video visit? No    Switched to Phone Start Time: 1:36 PM  Unable to see nor hear pt   Switched to phone encounter    Assessment & Plan     PND (post-nasal drip)  See below    Mild intermittent asthma without complication  Viral URI with cough  - albuterol (PROAIR HFA/PROVENTIL HFA/VENTOLIN HFA) 108 (90 Base) MCG/ACT inhaler  Dispense: 18 g; Refill: 1  - fluticasone (FLOVENT HFA) 110 MCG/ACT inhaler  Dispense: 12 g; Refill: 1      Patient Instructions   For your congestion, I recommend the followin. Use afrin nasal spray twice a day for up to 3 days. This helps open up the sinuses.  2. Use flonase nasal spray 1-2x a day during seasons when you are more prone to allergies.  3. Take zyrtec every day during allergy seasons. It works best when you take it regularly. You can take extra (such as 2-3 tablets per day) if needed, when your allergies are worse.  4. Cut back on your neti pot use    Use the albuterol inhaler up to 6 times a day - if you are needing it more than 6 times a day, you need to have an appointment to be evaluated by a provider    When you have colds, you should use a flovent inhaler just during the colds, to help prevent the severe coughs that happen with your asthma        Return if symptoms worsen or fail to improve.    Eloise Castle MD  [facility]                Ally Dillon is a 31 year old who presents for the following health issues     HPI     Acute Illness  Acute illness concerns: Sinus problems x preethi - reports that the sinus part is improving , cough 4-5 days   Onset/Duration: see above  Symptoms:  Fever: unknown  Chills/Sweats: no  Headache (location?): YES  Sinus Pressure:  "YES  Conjunctivitis:  YES  Ear Pain: no  Rhinorrhea: YES  Congestion: YES  Sore Throat: no  Cough: YES - non-productive, barking  Wheeze: no - she says maybe some wheezing  Decreased Appetite: no  Nausea: no  Vomiting: no  Diarrhea: no  Dysuria/Freq.: no  Dysuria or Hematuria: no  Fatigue/Achiness: YES  Sick/Strep Exposure: unknown  Therapies tried and outcome: None  Neti pot 2-3x/day  Was using robitussin dm but ran out, not sure how much help  Tylenol BID  Flonase - used it daily x2 weeks  Afrin - used it for 3 days and it helped definitely  Has a cat, has carpet  Was using claritin a little but not really  Tried some benadryl but it didn't seem to help much  Had asthma as a kid  She tried using her daughter's albuterol but it was a tiny dose  They run humidifiers but no air purifiers but supposedly has them  In their home forced air    Feels \"exhaustion\" in her upper chest    Review of Systems   Constitutional, HEENT, cardiovascular, pulmonary, gi and gu systems are negative, except as otherwise noted.      Objective       Vitals:  No vitals were obtained today due to virtual visit.    Physical Exam   GENERAL: Sounds healthy, alert and no distress  RESP: No audible wheeze, sounds congested though, had a couple of dry-sounding coughs during conversation  NEURO: Mentation and speech appropriate for age.  PSYCH: Mentation seems normal, judgement and insight intact    Remainder of exam unable to be done due to phone          PHONE-Visit Details    Type of service:  PHONE Visit    Video End Time:2:00 PM    Originating Location (pt. Location): Home    Distant Location (provider location):  [facility]     24 minutes phone visit  "

## 2022-02-22 ENCOUNTER — TRANSFERRED RECORDS (OUTPATIENT)
Dept: HEALTH INFORMATION MANAGEMENT | Facility: CLINIC | Age: 32
End: 2022-02-22
Payer: COMMERCIAL

## 2022-02-24 ENCOUNTER — OFFICE VISIT (OUTPATIENT)
Dept: FAMILY MEDICINE | Facility: CLINIC | Age: 32
End: 2022-02-24
Payer: COMMERCIAL

## 2022-02-24 VITALS
HEART RATE: 63 BPM | RESPIRATION RATE: 16 BRPM | BODY MASS INDEX: 32.27 KG/M2 | TEMPERATURE: 98.8 F | WEIGHT: 205.6 LBS | DIASTOLIC BLOOD PRESSURE: 62 MMHG | OXYGEN SATURATION: 99 % | SYSTOLIC BLOOD PRESSURE: 98 MMHG | HEIGHT: 67 IN

## 2022-02-24 DIAGNOSIS — R31.9 HEMATURIA: ICD-10-CM

## 2022-02-24 DIAGNOSIS — M45.9 ANKYLOSING SPONDYLITIS, UNSPECIFIED SITE OF SPINE (H): ICD-10-CM

## 2022-02-24 DIAGNOSIS — Z79.899 DRUG-INDUCED IMMUNODEFICIENCY (H): ICD-10-CM

## 2022-02-24 DIAGNOSIS — J98.01 BRONCHOSPASM: ICD-10-CM

## 2022-02-24 DIAGNOSIS — D84.821 DRUG-INDUCED IMMUNODEFICIENCY (H): ICD-10-CM

## 2022-02-24 DIAGNOSIS — R31.9 HEMATURIA, UNSPECIFIED TYPE: ICD-10-CM

## 2022-02-24 DIAGNOSIS — K21.00 GASTROESOPHAGEAL REFLUX DISEASE WITH ESOPHAGITIS WITHOUT HEMORRHAGE: Primary | ICD-10-CM

## 2022-02-24 LAB
ALBUMIN SERPL-MCNC: 3 G/DL (ref 3.4–5)
ALP SERPL-CCNC: 67 U/L (ref 40–150)
ALT SERPL W P-5'-P-CCNC: 21 U/L (ref 0–50)
ANION GAP SERPL CALCULATED.3IONS-SCNC: 10 MMOL/L (ref 3–14)
AST SERPL W P-5'-P-CCNC: 9 U/L (ref 0–45)
BASOPHILS # BLD AUTO: 0 10E3/UL (ref 0–0.2)
BASOPHILS NFR BLD AUTO: 0 %
BILIRUB SERPL-MCNC: 0.2 MG/DL (ref 0.2–1.3)
BUN SERPL-MCNC: 10 MG/DL (ref 7–30)
CALCIUM SERPL-MCNC: 9.2 MG/DL (ref 8.5–10.1)
CHLORIDE BLD-SCNC: 103 MMOL/L (ref 94–109)
CO2 SERPL-SCNC: 26 MMOL/L (ref 20–32)
COLLECT DURATION TIME UR: 24 H
CREAT 24H UR-MRATE: 1.18 G/SPEC (ref 0.8–1.8)
CREAT SERPL-MCNC: 0.81 MG/DL (ref 0.52–1.04)
CREAT UR-MCNC: 131 MG/DL
CRP SERPL-MCNC: 11.7 MG/L (ref 0–8)
EOSINOPHIL # BLD AUTO: 0.1 10E3/UL (ref 0–0.7)
EOSINOPHIL NFR BLD AUTO: 1 %
ERYTHROCYTE [DISTWIDTH] IN BLOOD BY AUTOMATED COUNT: 12.3 % (ref 10–15)
GFR SERPL CREATININE-BSD FRML MDRD: >90 ML/MIN/1.73M2
GLUCOSE BLD-MCNC: 80 MG/DL (ref 70–99)
HCT VFR BLD AUTO: 33.6 % (ref 35–47)
HGB BLD-MCNC: 11.2 G/DL (ref 11.7–15.7)
LYMPHOCYTES # BLD AUTO: 1.9 10E3/UL (ref 0.8–5.3)
LYMPHOCYTES NFR BLD AUTO: 22 %
MCH RBC QN AUTO: 30.8 PG (ref 26.5–33)
MCHC RBC AUTO-ENTMCNC: 33.3 G/DL (ref 31.5–36.5)
MCV RBC AUTO: 92 FL (ref 78–100)
MONOCYTES # BLD AUTO: 0.5 10E3/UL (ref 0–1.3)
MONOCYTES NFR BLD AUTO: 5 %
NEUTROPHILS # BLD AUTO: 6.2 10E3/UL (ref 1.6–8.3)
NEUTROPHILS NFR BLD AUTO: 72 %
PLATELET # BLD AUTO: 349 10E3/UL (ref 150–450)
POTASSIUM BLD-SCNC: 3.6 MMOL/L (ref 3.4–5.3)
PROT 24H UR-MRATE: 0.88 G/SPEC (ref 0.04–0.23)
PROT SERPL-MCNC: 6.8 G/DL (ref 6.8–8.8)
PROT UR-MCNC: 0.98 G/L
PROT/CREAT 24H UR: 0.75 G/G CR (ref 0–0.2)
RBC # BLD AUTO: 3.64 10E6/UL (ref 3.8–5.2)
SODIUM SERPL-SCNC: 139 MMOL/L (ref 133–144)
SPECIMEN VOL UR: 900 ML
WBC # BLD AUTO: 8.7 10E3/UL (ref 4–11)

## 2022-02-24 PROCEDURE — 81050 URINALYSIS VOLUME MEASURE: CPT | Performed by: NURSE PRACTITIONER

## 2022-02-24 PROCEDURE — 86140 C-REACTIVE PROTEIN: CPT | Performed by: NURSE PRACTITIONER

## 2022-02-24 PROCEDURE — 99213 OFFICE O/P EST LOW 20 MIN: CPT | Performed by: NURSE PRACTITIONER

## 2022-02-24 PROCEDURE — 82784 ASSAY IGA/IGD/IGG/IGM EACH: CPT | Performed by: NURSE PRACTITIONER

## 2022-02-24 PROCEDURE — 84156 ASSAY OF PROTEIN URINE: CPT | Performed by: NURSE PRACTITIONER

## 2022-02-24 PROCEDURE — 36415 COLL VENOUS BLD VENIPUNCTURE: CPT | Performed by: NURSE PRACTITIONER

## 2022-02-24 PROCEDURE — 80053 COMPREHEN METABOLIC PANEL: CPT | Performed by: NURSE PRACTITIONER

## 2022-02-24 PROCEDURE — 85025 COMPLETE CBC W/AUTO DIFF WBC: CPT | Performed by: NURSE PRACTITIONER

## 2022-02-24 RX ORDER — SUCRALFATE ORAL 1 G/10ML
1 SUSPENSION ORAL 4 TIMES DAILY PRN
Qty: 420 ML | Refills: 1 | Status: SHIPPED | OUTPATIENT
Start: 2022-02-24 | End: 2022-04-05

## 2022-02-24 NOTE — PROGRESS NOTES
Assessment & Plan     Gastroesophageal reflux disease with esophagitis without hemorrhage  Trial adding:  - sucralfate (CARAFATE) 1 GM/10ML suspension; Take 10 mLs (1 g) by mouth 4 times daily as needed (gerd, cough)    Bronchospasm  Continue inhalers    Hematuria, unspecified type  Due for [facility] labs  - IgA  - CRP inflammation  - Comprehensive metabolic panel (BMP + Alb, Alk Phos, ALT, AST, Total. Bili, TP)  - CBC with Platelets & Differential    Drug-induced immunodeficiency (H)  Followed by [facility]- due for lab monitoring today    Ankylosing spondylitis, unspecified site of spine (H)  Followed by [facility]- on immunosuppression           FUTURE APPOINTMENTS:       - Follow-up visit in 2-3 weeks if not improving, sooner for new or worsening symptoms.     See Patient Instructions    No follow-ups on file.    SALIMA Perales [facility]    Ally Dillon is a 31 year old who presents for the following health issues     Sinus Problem     History of Present Illness     Reason for visit:  Sinuses  Symptom onset:  3-4 weeks ago  Symptoms include:  Will discuss  Symptom intensity:  Moderate  Symptom progression:  Improving  Had these symptoms before:  Yes  Has tried/received treatment for these symptoms:  Yes  Previous treatment was successful:  Yes  Prior treatment description:  Na  What makes it worse:  Na  What makes it better:  Na    She eats 2-3 servings of fruits and vegetables daily.She consumes 1 sweetened beverage(s) daily.She exercises with enough effort to increase her heart rate 9 or less minutes per day.  She exercises with enough effort to increase her heart rate 3 or less days per week. She is missing 1 dose(s) of medications per week.       Acute Illness  Acute illness concerns: sinus  Onset/Duration: 3-4 weeks  Symptoms:  Fever: no  Chills/Sweats: no  Headache (location?): YES  Sinus Pressure: YES- has subsided   Conjunctivitis:  YES- treated with steroid eye  "drops   Ear Pain: no  Rhinorrhea: YES- PND  Congestion: no  Sore Throat: no  Cough: YES-non-productive, intermittent - also clearing throat. Having GERD with pregnancy. Cough is not worse lying down  Wheeze: no  Decreased Appetite: YES- off and on  Nausea: no  Vomiting: no  Diarrhea: YES  Dysuria/Freq.: no  Dysuria or Hematuria: no  Fatigue/Achiness: no  Sick/Strep Exposure: no  Therapies tried and outcome: tylenol, Flonase, Robitussin, Zyrtec, Flovent, Albuterol- inhalers are helping      Review of Systems   Constitutional, HEENT, cardiovascular, pulmonary, gi and gu systems are negative, except as otherwise noted.      Objective    BP 98/62   Pulse 63   Temp 98.8  F (37.1  C) (Tympanic)   Resp 16   Ht 1.702 m (5' 7\")   Wt 93.3 kg (205 lb 9.6 oz)   LMP 12/03/2021   SpO2 99%   BMI 32.20 kg/m    Body mass index is 32.2 kg/m .  Physical Exam   GENERAL: healthy, alert and no distress  EYES: Eyes grossly normal to inspection and conjunctivae and sclerae normal  HENT: ear canals and TM's normal, nose and mouth without ulcers or lesions  NECK: no adenopathy, no asymmetry, masses, or scars and thyroid normal to palpation  RESP: lungs clear to auscultation - no rales, rhonchi or wheezes  CV: regular rates and rhythm, normal S1 S2, no S3 or S4 and no murmur, click or rub  MS: no gross musculoskeletal defects noted, no edema  SKIN: no suspicious lesions or rashes  NEURO: Normal strength and tone, mentation intact and speech normal                "

## 2022-02-24 NOTE — PATIENT INSTRUCTIONS
Patient Education     How Acid Reflux Affects Your Throat      Do you have to clear your throat or cough often? Are you hoarse? Do you have trouble swallowing or heartburn or pain in your throat? If you have these or other throat symptoms, you may have acid reflux. This occurs when stomach acid flows back up and irritates your throat.  Why you have throat symptoms  There are muscles (esophageal sphincters) at both ends of the tube that carries food to your stomach (the esophagus). These muscles relax to let food pass. Then they tighten to keep stomach acid down. When the lower esophageal sphincter (LES) doesn t tighten enough, acid can flow back (reflux) from your stomach into your esophagus. This may cause heartburn. In some cases the upper esophageal sphincter (UES) also doesn t work well. Then acid can travel higher and enter your throat (pharynx). In many cases, this causes throat symptoms.  Common throat symptoms    Need to clear your throat often    Feeling like you re choking    Long-term (chronic) cough    Hoarseness    Trouble swallowing    Feel like you have a lump in your throat    Sour or acid taste    Sore throat that keeps coming back    Triptrotting last reviewed this educational content on 6/1/2019 2000-2021 [employer]. All rights reserved. This information is not intended as a substitute for professional medical care. Always follow your healthcare professional's instructions.           Patient Education     Bronchospasm (Adult)    Bronchospasm occurs when the airways (bronchial tubes) go into spasm and contract. This makes it hard to breathe and causes wheezing (a high-pitched whistling sound). Bronchospasm can also cause frequent coughing without wheezing.  Bronchospasm is due to irritation, inflammation, or allergic reaction of the airways. People with asthma get bronchospasm. However, not everyone with bronchospasm has asthma.  Being exposed to harmful fumes, a recent case of  bronchitis, exercise, or a flare-up of chronic obstructive pulmonary disease (COPD) may cause the airways to spasm. An episode of bronchospasm may last 7 to 14 days. Medicine may be prescribed to relax the airways and prevent wheezing. Antibiotics will be prescribed only if your healthcare provider thinks there is a bacterial infection. Antibiotics do not help a viral infection.  Home care    Drink lots of water or other fluids (at least 10 glasses a day) during an attack. This will loosen lung secretions and make it easier to breathe. If you have heart or kidney disease, check with your doctor before you drink extra fluids.    Take prescribed medicine exactly at the times advised. If you take an inhaled medicine to help with breathing, don't use it more than once every 4 hours, unless told to do so. If prescribed an antibiotic or prednisone, take all of the medicine, even if you are feeling better after a few days.    Don't smoke. Also avoid being exposed to secondhand smoke.    If you were given an inhaler, use it exactly as directed. If you need to use it more often than prescribed, your condition may be getting worse. Contact your healthcare provider.  Follow-up care  Follow up with your healthcare provider, or as advised.  If you are age 65 or older, have a chronic lung disease or condition that affects your immune system, or you smoke, ask your healthcare provider about getting a pneumococcal vaccine, as well as a yearly flu shot (influenza vaccine).  When to seek medical advice  Call your healthcare provider right away if any of these occur:    You need to use your inhalers more often than usual    Fever of 100.4 F (38 C) or higher, or as directed by your healthcare provider    Cough that brings up lots of dark-colored sputum (mucus)    You don't get better within 24 hours  Call 911  Call 911 if any of these occur:    Coughing up bloody sputum (mucus)    Chest pain with each breath    Increased wheezing or  shortness of breath  Malik last reviewed this educational content on 6/1/2018 2000-2021 [employer]. All rights reserved. This information is not intended as a substitute for professional medical care. Always follow your healthcare professional's instructions.

## 2022-02-25 LAB — IGA SERPL-MCNC: 359 MG/DL (ref 84–499)

## 2022-03-12 ENCOUNTER — HOSPITAL ENCOUNTER (EMERGENCY)
Facility: CLINIC | Age: 32
Discharge: HOME | End: 2022-03-12
Attending: EMERGENCY MEDICINE | Admitting: EMERGENCY MEDICINE
Payer: COMMERCIAL

## 2022-03-12 ENCOUNTER — NURSE TRIAGE (OUTPATIENT)
Dept: NURSING | Facility: CLINIC | Age: 32
End: 2022-03-12
Payer: COMMERCIAL

## 2022-03-12 ENCOUNTER — APPOINTMENT (OUTPATIENT)
Dept: GENERAL RADIOLOGY | Facility: CLINIC | Age: 32
End: 2022-03-12
Attending: EMERGENCY MEDICINE
Payer: COMMERCIAL

## 2022-03-12 VITALS
WEIGHT: 198 LBS | BODY MASS INDEX: 31.08 KG/M2 | DIASTOLIC BLOOD PRESSURE: 60 MMHG | HEIGHT: 67 IN | HEART RATE: 76 BPM | OXYGEN SATURATION: 98 % | SYSTOLIC BLOOD PRESSURE: 134 MMHG | TEMPERATURE: 97.8 F

## 2022-03-12 DIAGNOSIS — J20.9 ACUTE BRONCHITIS, UNSPECIFIED ORGANISM: ICD-10-CM

## 2022-03-12 DIAGNOSIS — Z3A.14 14 WEEKS GESTATION OF PREGNANCY: ICD-10-CM

## 2022-03-12 LAB
FLUAV RNA SPEC QL NAA+PROBE: NEGATIVE
FLUBV RNA RESP QL NAA+PROBE: NEGATIVE
SARS-COV-2 RNA RESP QL NAA+PROBE: NEGATIVE

## 2022-03-12 PROCEDURE — 87636 SARSCOV2 & INF A&B AMP PRB: CPT | Performed by: EMERGENCY MEDICINE

## 2022-03-12 PROCEDURE — 99284 EMERGENCY DEPT VISIT MOD MDM: CPT | Performed by: EMERGENCY MEDICINE

## 2022-03-12 PROCEDURE — C9803 HOPD COVID-19 SPEC COLLECT: HCPCS | Performed by: EMERGENCY MEDICINE

## 2022-03-12 PROCEDURE — 99284 EMERGENCY DEPT VISIT MOD MDM: CPT | Mod: 25 | Performed by: EMERGENCY MEDICINE

## 2022-03-12 PROCEDURE — 71046 X-RAY EXAM CHEST 2 VIEWS: CPT

## 2022-03-12 RX ORDER — PREDNISONE 20 MG/1
40 TABLET ORAL DAILY
Qty: 10 TABLET | Refills: 0 | Status: SHIPPED | OUTPATIENT
Start: 2022-03-12 | End: 2022-04-05

## 2022-03-12 NOTE — CONFIDENTIAL NOTE
S-(situation): Call from patient who reports cold symptoms x 1.5 weeks; coughing alot for the past 48 hrs; says lungs feel tired from coughing (not short of breath).    Tx: using albuterol and Flovent, robitussin, drinking warm water    Denies fever      B-(background): 16 weeks pregnant      A-(assessment): needs evaluation      R-(recommendations): be seen within 24 hrs    Reviewed care advice with caller per RN triage protocol guideline.  Advised to call back with worsening symptoms, concerns or questions.   Caller verbalized understanding and says she will go to urgent care today; patient declined virtual urgent care.        Marilia Lambert RN/[facility]

## 2022-03-12 NOTE — ED PROVIDER NOTES
History     Chief Complaint   Patient presents with     Cough     HPI  Wilma Palacios is a 32 year old female who is 14 weeks pregnant with history of rheumatoid arthritis and ankylosing spondylitis who presents for concern about 1.5 weeks of persistent URI symptoms and need for steroid therapy and other therapy for persistent URI symptoms despite using Robitussin-DM, albuterol and Flovent inhalers, Zyrtec and Flonase.  URI illness began approximately 1.5 weeks ago with with onset of sore throat, sneezing, nasal congestion, and then most recently a cough productive which has become productive of yellow sputum, with very small, scant, amounts of blood-streaked sputum in the last 2 days.  Her 2 and half-year-old toddler has a similar URI illness which she feels that 'he brought home from '. She is concerned about having bronchitis would like to get a chest x-ray.  She has no chest pain, shortness of breath, leg pain or leg swelling. She has no history of DVT or PE and no other risk factors for DVT/PE.  She is on Enbrel weekly injections for ankylosing spondylitis but has not taken this in the past 2 weeks as she has been instructed to do when she develops an infectious illness.  She would like to be prescribed prednisone which she sometime takes when she is holding Enbrel to decrease inflammation when she holds Enbrel.  No other pertinent history or acute complaints or concerns.      Allergies:  Allergies   Allergen Reactions     Acetic Acid Anaphylaxis, Itching and Rash     Ascorbate Anaphylaxis     All Fruits.     Nuts Swelling and Nausea and Vomiting     Pistachio Nut Extract Skin Test Anaphylaxis     Sulfa Drugs      Other Drug Allergy (See Comments) Hives and Rash     BLEACH       Problem List:    Patient Active Problem List    Diagnosis Date Noted     Drug-induced immunodeficiency (H) 02/24/2022     Priority: Medium     Subchorionic hemorrhage in first trimester 01/22/2022     Priority: Medium      Clinical diagnosis of COVID-19 2021     Priority: Medium     Cervical cancer screening 2020     Priority: Medium      NIL pap   NIL pap.   NIL pap.   NIL pap  20 NIL pap. Plan cotest in 1 year due bef 21 due to immunosuppression.   21 NIL Pap, Neg HPV. Plan cotest in 1 year. If NIL Pap, Neg HPV, then every 3 years.        Rheumatoid arthritis (H) 2020     Priority: Medium     Chronic chest wall pain 2018     Priority: Medium     Overview:   Secondary to spondyloarthropathy        HLA-B27 spondyloarthropathy 2018     Priority: Medium     Overview:   2nd opinion confirmed at [facility], diagnosed at [facility]        Ankylosing spondylitis of multiple sites in spine (H) 10/04/2018     Priority: Medium     Rheumatology note from [facility] (1/15/19)  scanned into Epic.  Does not think there are any contraindications for vaginal birth.  Follow up with them 32-34 wks.  Pneumonia vaccine in 2nd or 3rd trimester.  Also follow up 6-8 weeks after delivery, sooner as needed       Anxiety 2017     Priority: Medium     Allergic rhinitis 2014     Priority: Medium     Hypothyroidism 2013     Priority: Medium        Past Medical History:    Past Medical History:   Diagnosis Date     Ankylosing spondylitis with multisystem involvement (H) 2012     Thyroid disease        Past Surgical History:    Past Surgical History:   Procedure Laterality Date      SECTION  2019     COLONOSCOPY  2015     COLONOSCOPY       EXTRACTION(S) DENTAL  2009       Family History:    Family History   Problem Relation Age of Onset     Skin Cancer Maternal Grandfather      Rheumatoid Arthritis Maternal Grandfather      Heart Disease Paternal Grandmother        Social History:  Marital Status:   [2]  Social History     Tobacco Use     Smoking status: Former Smoker     Packs/day: 0.00     Years: 0.00     Pack years: 0.00     Quit date: 2013     Years since quittin.1      "Smokeless tobacco: Never Used   Substance Use Topics     Alcohol use: Not Currently     Comment: rarely     Drug use: No        Medications:    amoxicillin-clavulanate (AUGMENTIN) 875-125 MG tablet  predniSONE (DELTASONE) 20 MG tablet  albuterol (PROAIR HFA/PROVENTIL HFA/VENTOLIN HFA) 108 (90 Base) MCG/ACT inhaler  budesonide (RINOCORT AQUA) 32 MCG/ACT nasal spray  ENBREL MINI 50 MG/ML SOCT  fluticasone (FLONASE) 50 MCG/ACT nasal spray  fluticasone (FLOVENT HFA) 110 MCG/ACT inhaler  ondansetron (ZOFRAN) 4 MG tablet  Prenatal MV & Min w/FA-DHA (PRENATAL GUMMIES PO)  Prenatal Vit-Fe Fumarate-FA (PNV PRENATAL PLUS MULTIVITAMIN) 27-1 MG TABS per tablet  sucralfate (CARAFATE) 1 GM/10ML suspension  SYNTHROID 112 MCG tablet        Review of Systems  As mentioned above in the history present illness.  All other systems were reviewed and are negative.    Physical Exam   BP: 134/60  Pulse: 76  Temp: 97.8  F (36.6  C)  Height: 170.2 cm (5' 7\")  Weight: 89.8 kg (198 lb)  SpO2: 95 %  RR 14    Physical Exam  Vitals and nursing note reviewed.   Constitutional:       General: She is not in acute distress.     Appearance: Normal appearance. She is well-developed. She is not ill-appearing or diaphoretic.   HENT:      Head: Normocephalic and atraumatic.      Right Ear: External ear normal.      Left Ear: External ear normal.   Eyes:      General: No scleral icterus.     Extraocular Movements: Extraocular movements intact.      Conjunctiva/sclera: Conjunctivae normal.   Neck:      Trachea: No tracheal deviation.   Cardiovascular:      Rate and Rhythm: Normal rate and regular rhythm.      Heart sounds: Normal heart sounds. No murmur heard.    No friction rub. No gallop.   Pulmonary:      Effort: Pulmonary effort is normal. No respiratory distress.      Breath sounds: Normal breath sounds. No wheezing, rhonchi or rales.      Comments: O2 saturation 100% on room air at time of my exam.  Abdominal:      General: There is no distension. "   Musculoskeletal:         General: No swelling or tenderness. Normal range of motion.      Cervical back: Normal range of motion and neck supple.      Right lower leg: No edema.      Left lower leg: No edema.      Comments: She has no lower extremity swelling, cords or tenderness.   Skin:     General: Skin is warm and dry.      Coloration: Skin is not pale.      Findings: No erythema or rash.   Neurological:      General: No focal deficit present.      Mental Status: She is alert and oriented to person, place, and time.      Coordination: Coordination normal.   Psychiatric:         Mood and Affect: Mood normal.         Behavior: Behavior normal.         ED Course                 Procedures              Results for orders placed or performed during the hospital encounter of 03/12/22 (from the past 24 hour(s))   Symptomatic; Yes; 3/1/2022 Influenza A/B & SARS-CoV2 (COVID-19) Virus PCR Multiplex Nose    Specimen: Nose; Swab   Result Value Ref Range    Influenza A PCR Negative Negative    Influenza B PCR Negative Negative    SARS CoV2 PCR Negative Negative    Narrative    Testing was performed using the roni SARS-CoV-2 & Influenza A/B Assay on the roni Yara System. This test should be ordered for the detection of SARS-CoV-2 and influenza viruses in individuals who meet clinical and/or epidemiological criteria. Test performance is unknown in asymptomatic patients. This test is for in vitro diagnostic use under the FDA EUA for laboratories certified under CLIA to perform moderate and/or high complexity testing. This test has not been FDA cleared or approved. A negative result does not rule out the presence of PCR inhibitors in the specimen or target RNA in concentration below the limit of detection for the assay. If only one viral target is positive but coinfection with multiple targets is suspected, the sample should be re-tested with another FDA cleared, approved or authorized test, if coinfection would change  clinical management. [facility] are certified under the Clinical Laboratory Improvement Amendments of 1988 (CLIA-88) as  qualified to perform moderate and/or high complexity laboratory testing.   XR Chest 2 Views    Narrative    EXAM: XR CHEST 2 VW  LOCATION: [facility]  DATE/TIME: 3/12/2022 7:06 PM    INDICATION: cough URI symptoms in pregnancy  COMPARISON: 12/17/2019      Impression    IMPRESSION: Negative chest with no change.     I independently reviewed the X-rays: Agree with the Radiologist's interpretation.      Medications - No data to display    We reviewed the differential diagnosis including PE/DVT and she confirms that other than her pregnancy she has no risk factors and no other signs or symptoms of PE/DVT. She does not want to pursue laboratory evaluation or D-dimer and through shared decision-making we feel that PE/DVT is unlikely and will defer further evaluation for this pending trial of antibiotic therapy for bronchitis.  Her O2 saturation 100% on room air.  We discussed signs and symptoms of PE/DVT and signs and symptoms that indicate need for emergent reevaluation.  She expressed understanding of these issues and is comfortable with today's evaluation disposition plan.    Assessments & Plan (with Medical Decision Making)   4 weeks pregnant with history of rheumatoid arthritis and ankylosing spondylitis who presents for concern about 1.5 weeks of persistent URI symptoms and need for steroid therapy and other therapy for persistent URI symptoms despite using Robitussin-DM, albuterol and Flovent inhalers, Zyrtec and Flonase.  URI illness began approximately 1.5 weeks ago with with onset of sore throat, sneezing, nasal congestion, and then most recently a cough productive which has become productive of yellow sputum, with very small, scant, amounts of blood-streaked sputum in the last 2 days.  Her 2 and half-year-old toddler has a similar URI illness.  She has no chest pain, shortness of breath, hypoxia, tachypnea, tachycardia or other signs or symptoms of PE/DVT.  O2 saturation was % on room air when I spoke to her and evaluated her on several occasions during her ED evaluation.  COVID-19 and influenza testing were negative and chest x-ray was normal.  She appears to have a benign viral URI with possible secondary bacterial bronchitis. We discussed the differential diagnosis of her symptoms, including PE/DVT, and she declined further evaluation for this and my clinical gestalt is very low for PE/DVT and and I do not believe she has PE or DVT, in the setting of minimal hemoptysis with a productive cough and URI symptoms in the past 1.5 weeks. Will Rx Augmentin and Prednisone, which she requested because of its anti-inflammatory effect in light of the fact that she has been holding Enbrel for her connective tissue disease for the past 2-week due to as instructed when she develops an infectious illness.  I will have her discontinue or minimize OTC medications if possible.  I recommended clinic recheck in 2 days, Monday, 3/14/22,  and made a primary care referral to facilitate her care for follow-up.  We discussed signs and symptoms of PE/DVT and signs and symptoms that would that would indicate need for emergent reevaluation.  She is comfortable with today's evaluation disposition plan and understands need to return immediately if worsening in any way.  She was provided instructions for supportive care and will return as needed for worsened condition or worsening symptoms, or new problems or concerns.      I have reviewed the nursing notes.    I have reviewed the findings, diagnosis, plan and need for follow up with the patient.    New Prescriptions    AMOXICILLIN-CLAVULANATE (AUGMENTIN) 875-125 MG TABLET    Take 1 tablet by mouth 2 times daily for 7 days    PREDNISONE (DELTASONE) 20 MG TABLET    Take 2 tablets (40 mg) by mouth daily       Final diagnoses:    Acute bronchitis, unspecified organism - with a small amount of blood-streaked sputum/hemoptysis   14 weeks gestation of pregnancy       3/12/2022   [facility] EMERGENCY DEPT     Gonzalo Oleary MD  03/13/22 0918

## 2022-03-12 NOTE — ED NOTES
Pt states ill with URI x 1.5 weeks, cough worse last 2 days has had trouble sleeping, pt using inhalers and OTC meds with little relief. Pt states last 24 hours has noticed small amts of blood in sputum, believes its from coughing so frequently. Pt denies SOB, denies fevers. Pt 14 weeks pregnant. Denies abd pain, no cramping or bleeding.

## 2022-03-21 ENCOUNTER — VIRTUAL VISIT (OUTPATIENT)
Dept: FAMILY MEDICINE | Facility: CLINIC | Age: 32
End: 2022-03-21
Attending: EMERGENCY MEDICINE
Payer: COMMERCIAL

## 2022-03-21 DIAGNOSIS — M45.0 ANKYLOSING SPONDYLITIS OF MULTIPLE SITES IN SPINE (H): ICD-10-CM

## 2022-03-21 DIAGNOSIS — J06.9 UPPER RESPIRATORY TRACT INFECTION, UNSPECIFIED TYPE: Primary | ICD-10-CM

## 2022-03-21 DIAGNOSIS — K21.9 GASTROESOPHAGEAL REFLUX DISEASE WITHOUT ESOPHAGITIS: ICD-10-CM

## 2022-03-21 PROCEDURE — 99214 OFFICE O/P EST MOD 30 MIN: CPT | Mod: 95 | Performed by: PHYSICIAN ASSISTANT

## 2022-03-21 NOTE — PROGRESS NOTES
Wilma is a 32 year old who is being evaluated via a billable telephone visit.      What phone number would you like to be contacted at? [phone]  How would you like to obtain your AVS? MyChart    Assessment & Plan     (J06.9) Upper respiratory tract infection, unspecified type  (primary encounter diagnosis)  Comment: stable, not worsening but persisting cough. Whole body irritated given she is off enbrel.   Cough not improved with antibiotic which is not surprising given likelihood it is viral. Felt better on prednisone  Plan: I think at this point it may be more beneficial for her to restart enbrel and at least control joint pain and inflammation. Suspect cough needs more time. Would conitnue the inhalers     (M45.0) Ankylosing spondylitis of multiple sites in spine (H)  Comment:   Plan: follow up with rheumatology - consider restarting the enbrel    (K21.9) Gastroesophageal reflux disease without esophagitis  Comment:   Plan: using carafate prn        Return in about 1 week (around 3/28/2022) for If not improving or worsening.    Lea Avalos PA-C  [facility]   Wilma is a 32 year old who presents for the following health issues    HPI     ED/UC Followup:    Facility:  [facility] ER  Date of visit: 3/12/2022  Reason for visit: Bronchitis   Current Status: She is still coughing a lot. Pretty much feeling the same.      Biggest concern is that she can't be on the enbrel when she is sick but that leaves her body being so inflamed   Did end up taking the antibiotic which she feels didn't help because she suspects it is viral   Still using her inhalers     Following up on anxiety  Mentioned it at her last OB visit          Review of Systems   Remainder of ROS obtained and found to be negative other than that which was documented above        Objective           Vitals:  No vitals were obtained today due to virtual visit.    Physical Exam   healthy, alert and no  distress  PSYCH: Alert and oriented times 3; coherent speech, normal   rate and volume, able to articulate logical thoughts, able   to abstract reason, no tangential thoughts, no hallucinations   or delusions  Her affect is normal  RESP: No cough, no audible wheezing, able to talk in full sentences  Remainder of exam unable to be completed due to telephone visits      Phone call duration: 21 minutes

## 2022-03-29 ENCOUNTER — TELEPHONE (OUTPATIENT)
Dept: FAMILY MEDICINE | Facility: CLINIC | Age: 32
End: 2022-03-29

## 2022-03-29 ENCOUNTER — LAB (OUTPATIENT)
Dept: LAB | Facility: CLINIC | Age: 32
End: 2022-03-29
Payer: COMMERCIAL

## 2022-03-29 DIAGNOSIS — N89.8 VAGINAL DISCHARGE: ICD-10-CM

## 2022-03-29 PROCEDURE — 87210 SMEAR WET MOUNT SALINE/INK: CPT

## 2022-03-29 NOTE — TELEPHONE ENCOUNTER
Wet prep shows yeast - NO bacterial infection.   With pregnancy the recommended treatment for yeast infections is monistat (over the counter)  Lea Avalos PA-C

## 2022-03-29 NOTE — TELEPHONE ENCOUNTER
Patient called asking for results of her wet prep from today.  Result present,await result note.  Patient is asking for a call back after Lea reviews results.  Kenya Blas RN

## 2022-04-05 ENCOUNTER — VIRTUAL VISIT (OUTPATIENT)
Dept: FAMILY MEDICINE | Facility: CLINIC | Age: 32
End: 2022-04-05
Payer: COMMERCIAL

## 2022-04-05 DIAGNOSIS — H10.33 ACUTE BACTERIAL CONJUNCTIVITIS OF BOTH EYES: Primary | ICD-10-CM

## 2022-04-05 PROCEDURE — 99213 OFFICE O/P EST LOW 20 MIN: CPT | Mod: 95 | Performed by: PHYSICIAN ASSISTANT

## 2022-04-05 RX ORDER — CIPROFLOXACIN HYDROCHLORIDE 3.5 MG/ML
SOLUTION/ DROPS TOPICAL
Qty: 10 ML | Refills: 0 | Status: SHIPPED | OUTPATIENT
Start: 2022-04-05 | End: 2022-08-02

## 2022-04-05 NOTE — PATIENT INSTRUCTIONS
Use cipro eye drops every 2 hours while awake for 2 days then every 4 hours while awake for 5 days.  Do not wear contacts  Follow up with eye doctor if symptoms not improving over the next 2-3 days  Return urgently if any change in symptoms.

## 2022-04-05 NOTE — PROGRESS NOTES
Wilma is a 32 year old who is being evaluated via a billable telephone visit.      What phone number would you like to be contacted at? [phone]  How would you like to obtain your AVS? MyChart    Assessment & Plan     Acute bacterial conjunctivitis of both eyes  I cannot fully assess since no video, however, symptoms consistent with bacterial conjunctivitis- advised not to wear contacts and follow up with eye doctor if symptoms not improving over the next 2-3 days  - ciprofloxacin (CILOXAN) 0.3 % ophthalmic solution  Dispense: 10 mL; Refill: 0      Prescription drug management         Patient Instructions   Use cipro eye drops every 2 hours while awake for 2 days then every 4 hours while awake for 5 days.  Do not wear contacts  Follow up with eye doctor if symptoms not improving over the next 2-3 days  Return urgently if any change in symptoms.         Return in about 3 days (around 4/8/2022), or if symptoms worsen or fail to improve, for in person.    COSTA Diaz [facility]   Wilma is a 32 year old who presents for the following health issues     HPI pt states she has steroidal eye drop prescription for pink eye symptoms. She has questions/concerns regarding use of these drops during pregnancy.    Eye(s) Problem  Onset/Duration: ongoing, pt reports swelling as symptom in left eye occurs often.  Description:   Location: left eye  Pain: no  Redness: YES  Accompanying Signs & Symptoms:  Discharge/mattering: no  Swelling: YES  Visual changes: no  Fever: no  Nasal Congestion: no  Bothered by bright lights: no  History:  Trauma: no  Foreign body exposure: no  Wearing contacts: no  Precipitating or alleviating factors: pre-pregnancy, steroid eye drops alleviate syptoms  Therapies tried and outcome: None      Issues started December - has had recurrent symptoms at least 3 times since then  Seen by eye doctor in past and prescribed a steroid and antibiotic eye drop-  not a visible pink eye  Feels a little sore and goobers and crusty and itchy left eye   Wears contacts- admits does wear contacts for too long   18 weeks second pregnancy  Concerned because pharmacist informed her and did her own research and drops are considered a category C in pregnancy  Has moxifloxacin eye drops from a previous infection and  just treated with polytrim eye drops ( he is not a contact lens wearer)   Review of Systems   Constitutional, HEENT, cardiovascular, pulmonary, gi and gu systems are negative, except as otherwise noted.      Objective    Vitals - Patient Reported  Pain Score: No Pain (0)        Physical Exam   healthy, alert and no distress  PSYCH: Alert and oriented times 3; coherent speech, normal   rate and volume, able to articulate logical thoughts, able   to abstract reason, no tangential thoughts, no hallucinations   or delusions  Her affect is normal and pleasant  RESP: No cough, no audible wheezing, able to talk in full sentences  Remainder of exam unable to be completed due to telephone visits                Phone call duration: 10 minutes

## 2022-04-25 ENCOUNTER — HOSPITAL ENCOUNTER (EMERGENCY)
Facility: CLINIC | Age: 32
Discharge: HOME | End: 2022-04-25
Attending: EMERGENCY MEDICINE | Admitting: EMERGENCY MEDICINE
Payer: COMMERCIAL

## 2022-04-25 VITALS
SYSTOLIC BLOOD PRESSURE: 140 MMHG | OXYGEN SATURATION: 98 % | RESPIRATION RATE: 18 BRPM | BODY MASS INDEX: 32.96 KG/M2 | DIASTOLIC BLOOD PRESSURE: 83 MMHG | TEMPERATURE: 98.3 F | HEIGHT: 67 IN | WEIGHT: 210 LBS | HEART RATE: 101 BPM

## 2022-04-25 DIAGNOSIS — R10.13 EPIGASTRIC BURNING SENSATION: ICD-10-CM

## 2022-04-25 PROCEDURE — 76815 OB US LIMITED FETUS(S): CPT | Mod: 26 | Performed by: EMERGENCY MEDICINE

## 2022-04-25 PROCEDURE — 250N000013 HC RX MED GY IP 250 OP 250 PS 637: Performed by: EMERGENCY MEDICINE

## 2022-04-25 PROCEDURE — 99284 EMERGENCY DEPT VISIT MOD MDM: CPT | Mod: 25 | Performed by: EMERGENCY MEDICINE

## 2022-04-25 PROCEDURE — 76705 ECHO EXAM OF ABDOMEN: CPT | Performed by: EMERGENCY MEDICINE

## 2022-04-25 PROCEDURE — 250N000011 HC RX IP 250 OP 636: Performed by: EMERGENCY MEDICINE

## 2022-04-25 PROCEDURE — 76815 OB US LIMITED FETUS(S): CPT | Performed by: EMERGENCY MEDICINE

## 2022-04-25 PROCEDURE — 76705 ECHO EXAM OF ABDOMEN: CPT | Mod: 26 | Performed by: EMERGENCY MEDICINE

## 2022-04-25 RX ORDER — MAGNESIUM HYDROXIDE/ALUMINUM HYDROXICE/SIMETHICONE 120; 1200; 1200 MG/30ML; MG/30ML; MG/30ML
30 SUSPENSION ORAL ONCE
Status: COMPLETED | OUTPATIENT
Start: 2022-04-25 | End: 2022-04-25

## 2022-04-25 RX ORDER — FAMOTIDINE 20 MG/1
40 TABLET, FILM COATED ORAL ONCE
Status: COMPLETED | OUTPATIENT
Start: 2022-04-25 | End: 2022-04-25

## 2022-04-25 RX ORDER — ONDANSETRON 4 MG/1
4 TABLET, ORALLY DISINTEGRATING ORAL ONCE
Status: COMPLETED | OUTPATIENT
Start: 2022-04-25 | End: 2022-04-25

## 2022-04-25 RX ADMIN — FAMOTIDINE 40 MG: 20 TABLET, FILM COATED ORAL at 02:36

## 2022-04-25 RX ADMIN — ONDANSETRON 4 MG: 4 TABLET, ORALLY DISINTEGRATING ORAL at 03:33

## 2022-04-25 RX ADMIN — ALUMINUM HYDROXIDE, MAGNESIUM HYDROXIDE, AND SIMETHICONE 30 ML: 200; 200; 20 SUSPENSION ORAL at 02:36

## 2022-04-25 NOTE — ED TRIAGE NOTES
Pt presents with sudden onset of sharp burning upper abdominal pain that began 1 hr PTA. Pt is 21 weeks pregnant with planned  upon labor due to ankylosing spondylosis and kidney disease.     2nd pregnancy no other current concerns regarding pregnancy. Denies vaginal discharge. Pain is constant.

## 2022-04-25 NOTE — ED PROVIDER NOTES
History     Chief Complaint   Patient presents with     Abdominal Pain     HPI  Wilma Palacios is a 32 year old  female at 20weeks who presents for abdominal pain.  Symptoms woke her from sleep shortly prior to arrival.  Pain is described as sharp and burning in the epigastric region.  She has vomited once, nonbloody nonbilious.  Pain is severe.  She has not taking thing for this.  No fever, chills, chest pain, cough, dysuria, urinary frequency, vaginal bleeding, or rash.  She has had a prior  section, no other abdominal surgeries.    Allergies:  Allergies   Allergen Reactions     Acetic Acid Anaphylaxis, Itching and Rash     Ascorbate Anaphylaxis     All Fruits.     Nuts Swelling and Nausea and Vomiting     Pistachio Nut Extract Skin Test Anaphylaxis     Sulfa Drugs      Other Drug Allergy (See Comments) Hives and Rash     BLEACH       Problem List:    Patient Active Problem List    Diagnosis Date Noted     Drug-induced immunodeficiency (H) 2022     Priority: Medium     Subchorionic hemorrhage in first trimester 2022     Priority: Medium     Clinical diagnosis of COVID-19 2021     Priority: Medium     Cervical cancer screening 2020     Priority: Medium     2009 NIL pap  2013 NIL pap.  2014 NIL pap.  2017 NIL pap  20 NIL pap. Plan cotest in 1 year due bef 21 due to immunosuppression.   21 NIL Pap, Neg HPV. Plan cotest in 1 year. If NIL Pap, Neg HPV, then every 3 years.        Rheumatoid arthritis (H) 2020     Priority: Medium     Chronic chest wall pain 2018     Priority: Medium     Overview:   Secondary to spondyloarthropathy        HLA-B27 spondyloarthropathy 2018     Priority: Medium     Overview:   2nd opinion confirmed at [facility], diagnosed at [facility]        Ankylosing spondylitis of multiple sites in spine (H) 10/04/2018     Priority: Medium     Rheumatology note from [facility] (1/15/19)  scanned into Epic.  Does not think there are any  contraindications for vaginal birth.  Follow up with them 32-34 wks.  Pneumonia vaccine in 2nd or 3rd trimester.  Also follow up 6-8 weeks after delivery, sooner as needed       Anxiety 2017     Priority: Medium     Allergic rhinitis 2014     Priority: Medium     Hypothyroidism 2013     Priority: Medium        Past Medical History:    Past Medical History:   Diagnosis Date     Ankylosing spondylitis with multisystem involvement (H) 2012     Anxiety      Thyroid disease        Past Surgical History:    Past Surgical History:   Procedure Laterality Date      SECTION  2019     COLONOSCOPY  2015     COLONOSCOPY       EXTRACTION(S) DENTAL  2009       Family History:    Family History   Problem Relation Age of Onset     Skin Cancer Maternal Grandfather      Rheumatoid Arthritis Maternal Grandfather      Heart Disease Paternal Grandmother        Social History:  Marital Status:   [2]  Social History     Tobacco Use     Smoking status: Former Smoker     Packs/day: 0.00     Years: 0.00     Pack years: 0.00     Types: Cigarettes     Quit date: 2013     Years since quittin.3     Smokeless tobacco: Never Used   Vaping Use     Vaping Use: Never used   Substance Use Topics     Alcohol use: Not Currently     Comment: rarely     Drug use: No        Medications:    albuterol (PROAIR HFA/PROVENTIL HFA/VENTOLIN HFA) 108 (90 Base) MCG/ACT inhaler  budesonide (RINOCORT AQUA) 32 MCG/ACT nasal spray  ciprofloxacin (CILOXAN) 0.3 % ophthalmic solution  ENBREL MINI 50 MG/ML SOCT  fluticasone (FLONASE) 50 MCG/ACT nasal spray  fluticasone (FLOVENT HFA) 110 MCG/ACT inhaler  ondansetron (ZOFRAN) 4 MG tablet  Prenatal MV & Min w/FA-DHA (PRENATAL GUMMIES PO)  Prenatal Vit-Fe Fumarate-FA (PNV PRENATAL PLUS MULTIVITAMIN) 27-1 MG TABS per tablet  SYNTHROID 112 MCG tablet          Review of Systems  Pertinent positives and negatives listed in the HPI, all other systems reviewed and are negative.    Physical  "Exam   BP: (!) 140/83  Pulse: 101  Temp: 98.3  F (36.8  C)  Resp: 18  Height: 170.2 cm (5' 7\")  Weight: 95.3 kg (210 lb)  SpO2: 98 %      Physical Exam  Vitals and nursing note reviewed.   Constitutional:       General: She is in acute distress.      Appearance: She is well-developed. She is not diaphoretic.   HENT:      Head: Normocephalic and atraumatic.      Right Ear: External ear normal.      Left Ear: External ear normal.      Nose: Nose normal.   Eyes:      General: No scleral icterus.     Conjunctiva/sclera: Conjunctivae normal.   Cardiovascular:      Rate and Rhythm: Normal rate and regular rhythm.   Pulmonary:      Effort: Pulmonary effort is normal. No respiratory distress.      Breath sounds: No stridor.   Abdominal:      General: There is no distension.      Palpations: Abdomen is soft.      Tenderness: There is abdominal tenderness in the epigastric area. There is no guarding or rebound.   Musculoskeletal:      Cervical back: Normal range of motion.   Skin:     General: Skin is warm and dry.   Neurological:      Mental Status: She is alert and oriented to person, place, and time.   Psychiatric:         Behavior: Behavior normal.         ED Course                 Procedures    Results for orders placed during the hospital encounter of 04/25/22    POC US ABDOMEN LIMITED    [facility] Procedure Note    Limited Bedside ED Gallbladder  Ultrasound:    PROCEDURE: PERFORMED BY: Dr. Colin Vizcarra MD  INDICATIONS:  RUQ/Epigastric Pain  PROBE:  Low frequency convex probe  BODY LOCATION: Abdomen  FINDINGS:   An ultrasound of the gallbladder was performed using longitudinal and transverse views.  Gallstone(s):  Absent  Gallbladder sludge:  Absent  Sonographic Arriaga's sign:  Absent  Gallbladder wall thickening (greater than 4 mm):  Absent  Pericholecystic fluid: Absent  Common bile duct (dilated if internal diameter greater than 6 mm): 4 mm  INTERPRETATION:  The gallbladder evaluation is " normal with no gallstones/sludge, no sonographic Arriaga's sign, no GB wall thickening, no pericholecystic fluid, and without evidence of cholelithiasis or cholecystitis.  IMAGE DOCUMENTATION: Images were archived to PACs system.    [facility] Procedure Note    Limited Bedside ED Pelvic Ultrasound (PREGNANT PATIENT):    PROCEDURE: PERFORMED BY: Dr. Colin Vizcarra MD  INDICATIONS/SYMPTOM: Abdominal Pain  PROBE: Low frequency convex probe  Type of US Study: Transabdominal Exam  BODY LOCATION: Pelvis  FINDINGS: Fetal heart rate: Present and counted at 138 bpm.  INTERPRETATION: Normal pelvic ultrasound with intrauterine pregnancy present. FHR was 138 with noted fetal activity.  No pelvic free fluid was present. No adnexal abnormality noted.  IMAGE DOCUMENTATION: Images were archived to PACs system.            Critical Care time:  none               Results for orders placed or performed during the hospital encounter of 04/25/22 (from the past 24 hour(s))   POC US ABDOMEN LIMITED    Impression    [facility] Procedure Note      Limited Bedside ED Gallbladder  Ultrasound:    PROCEDURE: PERFORMED BY: Dr. Colin Vizcarra MD  INDICATIONS:  RUQ/Epigastric Pain  PROBE:  Low frequency convex probe  BODY LOCATION: Abdomen  FINDINGS:   An ultrasound of the gallbladder was performed using longitudinal and transverse views.  Gallstone(s):  Absent  Gallbladder sludge:  Absent  Sonographic Arriaga's sign:  Absent  Gallbladder wall thickening (greater than 4 mm):  Absent  Pericholecystic fluid: Absent  Common bile duct (dilated if internal diameter greater than 6 mm): 4 mm   INTERPRETATION:  The gallbladder evaluation is normal with no gallstones/sludge, no sonographic Arriaga's sign, no GB wall thickening, no pericholecystic fluid, and without evidence of cholelithiasis or cholecystitis.  IMAGE DOCUMENTATION: Images were archived to PACs system.    [facility] Procedure Note     Limited Bedside ED Pelvic  Ultrasound (PREGNANT PATIENT):    PROCEDURE: PERFORMED BY: Dr. Colin Vizcarra MD  INDICATIONS/SYMPTOM: Abdominal Pain  PROBE: Low frequency convex probe  Type of US Study: Transabdominal Exam  BODY LOCATION: Pelvis  FINDINGS: Fetal heart rate: Present and counted at 138 bpm.  INTERPRETATION: Normal pelvic ultrasound with intrauterine pregnancy present. FHR was 138 with noted fetal activity.  No pelvic free fluid was present. No adnexal abnormality noted.  IMAGE DOCUMENTATION: Images were archived to PACs system.        Medications   alum & mag hydroxide-simethicone (MAALOX) suspension 30 mL (30 mLs Oral Given 4/25/22 0236)   famotidine (PEPCID) tablet 40 mg (40 mg Oral Given 4/25/22 0236)   ondansetron (ZOFRAN-ODT) ODT tab 4 mg (4 mg Oral Given 4/25/22 6664)       Assessments & Plan (with Medical Decision Making)   32-year-old female who is G2, P1 at 20 weeks who presents for epigastric abdominal pain.  Blood pressure is 140/80, temperature is 36.8  C, heart rate 101, SPO2 is 98% on room air.  Bedside ultrasound shows intrauterine pregnancy with fetal heart of 138.  Ultrasound of the gallbladder is negative for signs of cholelithiasis or cholecystitis.  She is given Maalox and famotidine for symptoms.  On recheck she is feeling much better.  Still some mild nausea and stomach discomfort but states she is much better.  Recheck of her abdomen is again benign and not concerning for an acute surgical process such as appendicitis.  She feels comfortable going home and I think this is a reasonable plan.  She is discharged with instructions to return if she has worsening of her symptoms.  Otherwise try taking Tums or Maalox as needed, start taking famotidine daily, get rechecked if not feeling better over the next day or 2.  The patient is in agreement with this plan.    I have reviewed the nursing notes.    I have reviewed the findings, diagnosis, plan and need for follow up with the patient.       Discharge  Medication List as of 4/25/2022  3:26 AM          Final diagnoses:   Epigastric burning sensation       4/25/2022   [facility] EMERGENCY DEPT     Colin Vizcarra MD  04/25/22 0344

## 2022-04-26 ENCOUNTER — PATIENT OUTREACH (OUTPATIENT)
Dept: CARE COORDINATION | Facility: CLINIC | Age: 32
End: 2022-04-26
Payer: COMMERCIAL

## 2022-04-26 DIAGNOSIS — Z71.89 OTHER SPECIFIED COUNSELING: ICD-10-CM

## 2022-04-26 NOTE — PROGRESS NOTES
Clinic Care Coordination Contact  [facility]/Voicemail       Clinical Data: Care Coordinator Outreach  Outreach attempted x 1.  Left message on patient's voicemail with call back information and requested return call.  Plan: Care Coordinator will try to reach patient again in 1-2 business days.    .Darya LEO Community Health Worker  Clinic Care Coordination  [facility]  Phone: [phone]

## 2022-04-27 NOTE — PROGRESS NOTES
Clinic Care Coordination Contact  [facility]: Post-Discharge Note  SITUATION                                                      Admission:    Admission Date: 22   Reason for Admission: abdominal pain  Discharge:   Discharge Date: 22  Discharge Diagnosis: Epigastric burning sensation    BACKGROUND                                                      Per hospital discharge summary and inpatient provider notes:  Wilma Palacios is a 32 year old  female at 20weeks who presents for abdominal pain.  Symptoms woke her from sleep shortly prior to arrival.  Pain is described as sharp and burning in the epigastric region.  She has vomited once, nonbloody nonbilious.  Pain is severe.  She has not taking thing for this.  No fever, chills, chest pain, cough, dysuria, urinary frequency, vaginal bleeding, or rash.  She has had a prior  section, no other abdominal surgeries.    ASSESSMENT      Enrollment  Primary Care Care Coordination Status: Declined    Discharge Assessment  How are you doing now that you are home?: good  How are your symptoms? (Red Flag symptoms escalate to triage hotline per guidelines): Improved  Does the patient have questions regarding their discharge instructions? : No  Were you started on any new medications or were there changes to any of your previous medications? : No  Does the patient have all of their medications?: Yes  Do you have questions regarding any of your medications? : No  Do you have all of your needed medical supplies or equipment (DME)?  (i.e. oxygen tank, CPAP, cane, etc.): Yes  Discharge follow-up appointment scheduled within 14 calendar days? : No  Is patient agreeable to assistance with scheduling? : No       PLAN                                                      Outpatient Plan:  Get rechecked if not feeling better over the next day or 2    No future appointments.      For any urgent concerns, please contact our 24 hour nurse triage line:  [phone] ([phone])         Darya Figueroa MA

## 2022-08-01 ENCOUNTER — VIRTUAL VISIT (OUTPATIENT)
Dept: FAMILY MEDICINE | Facility: CLINIC | Age: 32
End: 2022-08-01
Payer: COMMERCIAL

## 2022-08-01 DIAGNOSIS — J20.9 ACUTE BRONCHITIS, UNSPECIFIED ORGANISM: Primary | ICD-10-CM

## 2022-08-01 DIAGNOSIS — M45.0 ANKYLOSING SPONDYLITIS OF MULTIPLE SITES IN SPINE (H): ICD-10-CM

## 2022-08-01 PROCEDURE — 99213 OFFICE O/P EST LOW 20 MIN: CPT | Mod: 95 | Performed by: INTERNAL MEDICINE

## 2022-08-01 NOTE — PROGRESS NOTES
"Wilma is a 32 year old who is being evaluated via a billable video visit.      How would you like to obtain your AVS? MyChart  If the video visit is dropped, the invitation should be resent by: Text to cell phone: [phone]  Will anyone else be joining your video visit? No          Assessment & Plan     Acute bronchitis, unspecified organism  Started having sore throat last week  Also having congestion symptoms  Tested negative for COVID  She now is having cough  Cough is worse in the mornings and as the day goes by it gets slightly better  She is having yellow sputum in the morning and clear sputum in the evening  Cough is okay at night  No fevers  No lethargy  No weakness  We discussed about the antibiotics  At this point I would not use any antibiotics as this could be just viral bronchitis  She is in her third trimester pregnancy  This is another reason we need to be careful  I discussed about azithromycin as an option if need be  Would not recommend Augmentin as it can cause necrotizing enterocolitis and baby  Again antibiotics will not be used at this juncture as they can cross the placenta and affect the gut briana of the baby  Since she is stable and seems to be getting better we will hold off on antibiotics for now    Ankylosing spondylitis of multiple sites in spine (H)  She used to be on Enabrel  Currently she is on prednisone        30 minutes spent on the date of the encounter doing chart review, history and exam, documentation and further activities per the note       BMI:   Estimated body mass index is 32.89 kg/m  as calculated from the following:    Height as of 4/25/22: 1.702 m (5' 7\").    Weight as of 4/25/22: 95.3 kg (210 lb).           No follow-ups on file.    Mike Fontaine MD  [facility]   Wilma is a 32 year old, presenting for the following health issues:  No chief complaint on file.      History of Present Illness       Reason for visit:  " Cough  Symptom onset:  1-2 weeks ago  Symptoms include:  Cough  Symptom intensity:  Moderate  Symptom progression:  Staying the same  Had these symptoms before:  Yes  Has tried/received treatment for these symptoms:  No  What makes it worse:  No  What makes it better:  Going outside    She eats 0-1 servings of fruits and vegetables daily.She consumes 1 sweetened beverage(s) daily.She exercises with enough effort to increase her heart rate 9 or less minutes per day.  She exercises with enough effort to increase her heart rate 3 or less days per week. She is missing 1 dose(s) of medications per week.  She is not taking prescribed medications regularly due to remembering to take.             Review of Systems   Constitutional, HEENT, cardiovascular, pulmonary, gi and gu systems are negative, except as otherwise noted.      Objective           Vitals:  No vitals were obtained today due to virtual visit.    Physical Exam   GENERAL: Healthy, alert and no distress  EYES: Eyes grossly normal to inspection.  No discharge or erythema, or obvious scleral/conjunctival abnormalities.  RESP: No audible wheeze, cough, or visible cyanosis.  No visible retractions or increased work of breathing.    SKIN: Visible skin clear. No significant rash, abnormal pigmentation or lesions.  NEURO: Cranial nerves grossly intact.  Mentation and speech appropriate for age.  PSYCH: Mentation appears normal, affect normal/bright, judgement and insight intact, normal speech and appearance well-groomed.                Video-Visit Details    Video Start Time: 11:00 AM    Type of service:  Video Visit    Video End Time:11:17 AM    Originating Location (pt. Location): Home    Distant Location (provider location):  [facility]     Platform used for Video Visit: [employer]  Jammie

## 2022-08-02 ENCOUNTER — VIRTUAL VISIT (OUTPATIENT)
Dept: FAMILY MEDICINE | Facility: CLINIC | Age: 32
End: 2022-08-02
Payer: COMMERCIAL

## 2022-08-02 DIAGNOSIS — J06.9 UPPER RESPIRATORY TRACT INFECTION, UNSPECIFIED TYPE: Primary | ICD-10-CM

## 2022-08-02 DIAGNOSIS — R05.9 COUGH: ICD-10-CM

## 2022-08-02 PROCEDURE — 99213 OFFICE O/P EST LOW 20 MIN: CPT | Mod: 95 | Performed by: PHYSICIAN ASSISTANT

## 2022-08-02 NOTE — PROGRESS NOTES
"Wilma is a 32 year old who is being evaluated via a billable telephone visit.      What phone number would you like to be contacted at? [phone]  How would you like to obtain your AVS? MyChart    Assessment & Plan       ICD-10-CM    1. Upper respiratory tract infection, unspecified type  J06.9    2. Cough  R05.9      Not any worse - no fevers. Managing for the most part with over the counter medications that are okay with pregnancy  Will continue current course for now but asked that she check in with  Me later this week if not getting better or sooner if worse. I'm hopeful that a few more days will result in at least some improvement in symptoms to know we are headed the right directon        BMI:   Estimated body mass index is 32.89 kg/m  as calculated from the following:    Height as of 4/25/22: 1.702 m (5' 7\").    Weight as of 4/25/22: 95.3 kg (210 lb).           No follow-ups on file.    Lea Avalos PA-C  [facility]   Wilma is a 32 year old, presenting for the following health issues:  Cough      HPI     Answers for HPI/ROS submitted by the patient on 8/1/2022  How many servings of fruits and vegetables do you eat daily?: 0-1  On average, how many sweetened beverages do you drink each day (Examples: soda, juice, sweet tea, etc.  Do NOT count diet or artificially sweetened beverages)?: 1  How many minutes a day do you exercise enough to make your heart beat faster?: 9 or less  How many days a week do you exercise enough to make your heart beat faster?: 3 or less  How many days per week do you miss taking your medication?: 1  What makes it hard for you to take your medication every day?: remembering to take  What is the reason for your visit today?: Cough  When did your symptoms begin?: 1-2 weeks ago  What are your symptoms?: Cough  How would you describe these symptoms?: Moderate  Are your symptoms:: Staying the same  Have you had these symptoms before?: Yes  Have " you tried or received treatment for these symptoms before?: No  Is there anything that makes you feel worse?: No  Is there anything that makes you feel better?: Going outside    -She did take a covid test and it was negative.     Thinks she had a sinus infection or a bad cold - not sure which one  It rans its course pretty quickly  Last Mon/Tue - sore throat, Wed - congestion, Friday night cough started, still congested  Productive cough of white phlegm   No SOB  No fevers  Started out using Robitussin DM but then wasn't sure she should be taking that  Has been using her albuterol and flovent  Did just start the low dose prednisone (10mg) a couple days ago  Sleeping okay at night - cough doesn't seem to be as bad          Review of Systems   Remainder of ROS obtained and found to be negative other than that which was documented above        Objective           Vitals:  No vitals were obtained today due to virtual visit.    Physical Exam   healthy, alert and no distress  PSYCH: Alert and oriented times 3; coherent speech, normal   rate and volume, able to articulate logical thoughts, able   to abstract reason, no tangential thoughts, no hallucinations   or delusions  Her affect is normal  RESP: No cough, no audible wheezing, able to talk in full sentences  Remainder of exam unable to be completed due to telephone visits        Phone call duration: 19 minutes    .  ..

## 2022-08-03 ENCOUNTER — MYC MEDICAL ADVICE (OUTPATIENT)
Dept: FAMILY MEDICINE | Facility: CLINIC | Age: 32
End: 2022-08-03

## 2022-08-03 DIAGNOSIS — J20.9 ACUTE BRONCHITIS WITH SYMPTOMS > 10 DAYS: Primary | ICD-10-CM

## 2022-08-03 RX ORDER — AZITHROMYCIN 250 MG/1
TABLET, FILM COATED ORAL
Qty: 6 TABLET | Refills: 0 | Status: SHIPPED | OUTPATIENT
Start: 2022-08-03 | End: 2022-08-08

## 2022-08-05 ENCOUNTER — MYC MEDICAL ADVICE (OUTPATIENT)
Dept: FAMILY MEDICINE | Facility: CLINIC | Age: 32
End: 2022-08-05

## 2022-08-05 DIAGNOSIS — J45.20 MILD INTERMITTENT ASTHMA WITHOUT COMPLICATION: ICD-10-CM

## 2022-08-05 RX ORDER — ALBUTEROL SULFATE 90 UG/1
2 AEROSOL, METERED RESPIRATORY (INHALATION) EVERY 6 HOURS
Qty: 18 G | Refills: 1 | Status: SHIPPED | OUTPATIENT
Start: 2022-08-05 | End: 2023-09-18

## 2022-08-05 NOTE — TELEPHONE ENCOUNTER
Routing refill request to provider for review/approval because:  No ACT on file     Inder Pardo RN

## 2022-08-19 PROBLEM — Z12.4 CERVICAL CANCER SCREENING: Status: ACTIVE | Noted: 2020-11-25

## 2022-09-02 ENCOUNTER — E-VISIT (OUTPATIENT)
Dept: FAMILY MEDICINE | Facility: CLINIC | Age: 32
End: 2022-09-02
Payer: COMMERCIAL

## 2022-09-02 DIAGNOSIS — N89.8 VAGINAL DISCHARGE: Primary | ICD-10-CM

## 2022-09-02 PROCEDURE — 99207 PR NON-BILLABLE SERV PER CHARTING: CPT | Performed by: FAMILY MEDICINE

## 2022-09-03 ENCOUNTER — HEALTH MAINTENANCE LETTER (OUTPATIENT)
Age: 32
End: 2022-09-03

## 2022-09-06 ENCOUNTER — PATIENT OUTREACH (OUTPATIENT)
Dept: FAMILY MEDICINE | Facility: CLINIC | Age: 32
End: 2022-09-06

## 2022-11-07 ENCOUNTER — E-VISIT (OUTPATIENT)
Dept: FAMILY MEDICINE | Facility: CLINIC | Age: 32
End: 2022-11-07
Payer: COMMERCIAL

## 2022-11-07 DIAGNOSIS — N76.0 BACTERIAL VAGINOSIS: ICD-10-CM

## 2022-11-07 DIAGNOSIS — B96.89 BACTERIAL VAGINOSIS: ICD-10-CM

## 2022-11-07 DIAGNOSIS — B37.31 CANDIDAL VULVOVAGINITIS: ICD-10-CM

## 2022-11-07 PROCEDURE — 99421 OL DIG E/M SVC 5-10 MIN: CPT | Performed by: FAMILY MEDICINE

## 2022-11-07 RX ORDER — FLUCONAZOLE 150 MG/1
150 TABLET ORAL ONCE
Qty: 1 TABLET | Refills: 0 | Status: SHIPPED | OUTPATIENT
Start: 2022-11-07 | End: 2022-11-07

## 2022-11-07 RX ORDER — METRONIDAZOLE 7.5 MG/G
1 GEL VAGINAL AT BEDTIME
Qty: 70 G | Refills: 0 | Status: SHIPPED | OUTPATIENT
Start: 2022-11-07 | End: 2022-11-12

## 2022-11-07 NOTE — PATIENT INSTRUCTIONS
Yeast Infection (Candida Vaginal Infection)    You have a Candida vaginal infection. This is also known as a yeast infection. It's most often caused by a type of yeast (fungus) called Candida. Candida are normally found in the vagina. But if they increase in number, this can lead to infection and cause symptoms.   Symptoms of a yeast infection can include:     Clumpy or thin, white discharge, which may look like cottage cheese    Itching or burning    Burning with urination  Certain factors can make a yeast infection more likely. These can include:     Taking certain medicines, such as antibiotics or birth control pills    Pregnancy    Diabetes    Weak immune system  A yeast infection is most often treated with antifungal medicine. This may be given as a vaginal cream or pills you take by mouth. Treatment may last for about 1 to 7 days. Women with severe or recurrent infections may need longer courses of treatment.   Home care    If you re prescribed medicine, be sure to use it as directed. Finish all of the medicine, even if your symptoms go away. Don t try to treat yourself using over-the-counter products without talking with your provider first. They will let you know if this is a good option for you.    Ask your provider what steps you can take to help reduce your risk of having a yeast infection in the future.    Follow-up care  Follow up with your healthcare provider, or as directed.   When to seek medical advice  Call your healthcare provider right away if:     You have a fever of 100.4 F (38 C) or higher, or as directed by your provider.    Your symptoms worsen, or they don t go away within a few days of starting treatment.    You have new pain in the lower belly or pelvic region.    You have side effects that bother you or a reaction to the cream or pills you re prescribed.    You or any partners you have sex with have new symptoms, such as a rash, joint pain, or sores.  Malik last reviewed this  educational content on 2020-2021 [employer]. All rights reserved. This information is not intended as a substitute for professional medical care. Always follow your healthcare professional's instructions.          Bacterial Vaginosis    You have a vaginal infection called bacterial vaginosis (BV). Both good and bad bacteria are present in a healthy vagina. BV occurs when these bacteria get out of balance. The number of bad bacteria increase. And the number of good bacteria decrease. BV is linked with sexual activity, but it's not a sexually transmitted infection (STI).   BV may or may not cause symptoms. If symptoms do occur, they can include:     Thin, gray, milky-white, or sometimes green discharge    Unpleasant odor or  fishy  smell    Itching, burning, or pain in or around the vagina  It is not known what causes BV, but certain factors can make the problem more likely. These can include:     Douching    Spermicides    Use of antibiotics    Change in hormone levels with pregnancy, breastfeeding, or menopause    Having sex with a new partner    Having sex with more than one partner  BV will sometimes go away on its own. But treatment is often advised. This is because untreated BV can raise the risk of more serious health problems such as:     Pelvic inflammatory disease (PID)     delivery (giving birth to a baby early if you re pregnant)    HIV and some other sexually transmitted infections (STIs)    Infection after surgery on the reproductive organs  Home care  General care    BV is most often treated with medicines called antibiotics. These may be given as pills or as a vaginal cream. If antibiotics are prescribed, be sure to use them exactly as directed. And complete all of the medicine, even if your symptoms go away.    Don't douche or having sex during treatment.    If you have sex with a female partner, ask your healthcare provider if she should also be  treated.  Prevention    Don't douche.    Don't have sex. If you do have sex, then take steps to lower your risk:  ? Use condoms when having sex.  ? Limit the number of sex partners you have.    Follow-up care  Follow up with your healthcare provider, or as advised.   When to get medical advice  Call your healthcare provider right away if:     You have a fever of 100.4 F (38 C) or higher, or as directed by your provider.    Your symptoms get worse, or they don t go away within a few days of starting treatment.    You have new pain in the lower belly or pelvic region.    You have side effects that bother you or a reaction to the pills or cream you re prescribed.    You or any of your sex partners have new symptoms, such as a rash, joint pain, or sores.  [employer] last reviewed this educational content on 6/1/2020 2000-2021 [employer]. All rights reserved. This information is not intended as a substitute for professional medical care. Always follow your healthcare professional's instructions.

## 2023-01-23 ENCOUNTER — NURSE TRIAGE (OUTPATIENT)
Dept: NURSING | Facility: CLINIC | Age: 33
End: 2023-01-23

## 2023-01-23 ENCOUNTER — E-VISIT (OUTPATIENT)
Dept: FAMILY MEDICINE | Facility: CLINIC | Age: 33
End: 2023-01-23

## 2023-01-23 ENCOUNTER — LAB (OUTPATIENT)
Dept: LAB | Facility: CLINIC | Age: 33
End: 2023-01-23
Payer: COMMERCIAL

## 2023-01-23 DIAGNOSIS — R35.0 URINARY FREQUENCY: ICD-10-CM

## 2023-01-23 DIAGNOSIS — R30.0 DYSURIA: Primary | ICD-10-CM

## 2023-01-23 DIAGNOSIS — R30.0 DYSURIA: ICD-10-CM

## 2023-01-23 LAB
ALBUMIN UR-MCNC: ABNORMAL MG/DL
APPEARANCE UR: CLEAR
BACTERIA #/AREA URNS HPF: ABNORMAL /HPF
BILIRUB UR QL STRIP: NEGATIVE
COLOR UR AUTO: YELLOW
GLUCOSE UR STRIP-MCNC: NEGATIVE MG/DL
HGB UR QL STRIP: ABNORMAL
KETONES UR STRIP-MCNC: NEGATIVE MG/DL
LEUKOCYTE ESTERASE UR QL STRIP: NEGATIVE
NITRATE UR QL: NEGATIVE
PH UR STRIP: 5.5 [PH] (ref 5–7)
RBC #/AREA URNS AUTO: ABNORMAL /HPF
SP GR UR STRIP: >=1.03 (ref 1–1.03)
UROBILINOGEN UR STRIP-ACNC: 0.2 E.U./DL
WBC #/AREA URNS AUTO: ABNORMAL /HPF

## 2023-01-23 PROCEDURE — 81001 URINALYSIS AUTO W/SCOPE: CPT

## 2023-01-23 PROCEDURE — 87086 URINE CULTURE/COLONY COUNT: CPT

## 2023-01-23 PROCEDURE — 99422 OL DIG E/M SVC 11-20 MIN: CPT | Performed by: FAMILY MEDICINE

## 2023-01-23 RX ORDER — FLUCONAZOLE 150 MG/1
150 TABLET ORAL ONCE
Qty: 2 TABLET | Refills: 0 | Status: SHIPPED | OUTPATIENT
Start: 2023-01-23 | End: 2023-01-23

## 2023-01-23 RX ORDER — NITROFURANTOIN 25; 75 MG/1; MG/1
100 CAPSULE ORAL 2 TIMES DAILY
Qty: 10 CAPSULE | Refills: 0 | Status: SHIPPED | OUTPATIENT
Start: 2023-01-23 | End: 2023-01-28

## 2023-01-23 NOTE — TELEPHONE ENCOUNTER
"Nurse Triage SBAR    Is this a 2nd Level Triage? YES, LICENSED PRACTITIONER REVIEW IS REQUIRED    Situation: UTI    Background: Patient states that she thinks she has a UTI. States that she feels a lot of pressure around her bladder and is urinating frequently in small amounts. She also states that there is some \"chuncky, brown discharge\". She denies pain and burning with urination, denies fever, denies low back and flank pain, denies fever.     Assessment: Probable UTI    Protocol Recommended Disposition:   See in Office Today    Recommendation:     Please contact this patient with any further recommendations. Thanks     Routed to provider     ANEUDY CRUZ RN      Does the patient meet one of the following criteria for ADS visit consideration? 16+ years old, with an [facility] PCP     TIP  Providers, please consider if this condition is appropriate for management at one of our Acute and Diagnostic Services sites.     If patient is a good candidate, please use dotphrase <dot>triageresponse and select Refer to ADS to document.    Reason for Disposition    Urinating more frequently than usual (i.e., frequency)    Additional Information    Negative: Shock suspected (e.g., cold/pale/clammy skin, too weak to stand, low BP, rapid pulse)    Negative: Sounds like a life-threatening emergency to the triager    Negative: Followed a female genital area injury (e.g., vagina, vulva)    Negative: Followed a male genital area injury (penis, scrotum)    Negative: Vaginal discharge    Negative: Pus (white, yellow) or bloody discharge from end of penis    Negative: Pain or burning with passing urine (urination) and pregnant    Negative: Pain or burning with passing urine (urination) and female    Negative: Pain or burning with passing urine (urination) and male    Negative: Pain or itching in the vulvar area    Negative: Pain in scrotum is main symptom    Negative: Blood in the urine is main symptom    Negative: Symptoms arising " from use of a urinary catheter (e.g., coude, Pascual)    Negative: Unable to urinate (or only a few drops) > 4 hours and bladder feels very full (e.g., palpable bladder or strong urge to urinate)    Negative: Decreased urination and drinking very little and dehydration suspected (e.g., dark urine, no urine > 12 hours, very dry mouth, very lightheaded)    Negative: Patient sounds very sick or weak to the triager    Negative: Fever > 100.4 F  (38.0 C)    Negative: Side (flank) or lower back pain present    Negative: Can't control passage of urine (i.e., urinary incontinence) and new-onset (< 2 weeks) or worsening    Protocols used: URINARY SYMPTOMS-A-OH

## 2023-01-23 NOTE — PATIENT INSTRUCTIONS
Dear Wilma Palacios    Please see separate message in this visit!    Thanks for choosing us as your health care partner,    Constance Lomax MD

## 2023-01-25 LAB — BACTERIA UR CULT: NO GROWTH

## 2023-03-29 ENCOUNTER — NURSE TRIAGE (OUTPATIENT)
Dept: NURSING | Facility: CLINIC | Age: 33
End: 2023-03-29

## 2023-03-29 ENCOUNTER — E-VISIT (OUTPATIENT)
Dept: FAMILY MEDICINE | Facility: CLINIC | Age: 33
End: 2023-03-29
Payer: COMMERCIAL

## 2023-03-29 DIAGNOSIS — N89.8 VAGINAL DISCHARGE: Primary | ICD-10-CM

## 2023-03-29 DIAGNOSIS — B37.31 CANDIDIASIS OF VAGINA: ICD-10-CM

## 2023-03-29 PROCEDURE — 99421 OL DIG E/M SVC 5-10 MIN: CPT | Performed by: PHYSICIAN ASSISTANT

## 2023-03-29 NOTE — PATIENT INSTRUCTIONS
Thank you for choosing us for your care. Given your symptoms, I would like you to do a lab-only visit to determine what is causing them.  I have placed the orders for a wet prep.  Please schedule an appointment with the lab right here in [facility], or call [phone].  I will let you know when the results are back and next steps to take.

## 2023-03-30 ENCOUNTER — MYC MEDICAL ADVICE (OUTPATIENT)
Dept: FAMILY MEDICINE | Facility: CLINIC | Age: 33
End: 2023-03-30

## 2023-03-30 ENCOUNTER — LAB (OUTPATIENT)
Dept: LAB | Facility: CLINIC | Age: 33
End: 2023-03-30
Payer: COMMERCIAL

## 2023-03-30 ENCOUNTER — TELEPHONE (OUTPATIENT)
Dept: FAMILY MEDICINE | Facility: CLINIC | Age: 33
End: 2023-03-30

## 2023-03-30 DIAGNOSIS — N76.0 BV (BACTERIAL VAGINOSIS): Primary | ICD-10-CM

## 2023-03-30 DIAGNOSIS — B96.89 BV (BACTERIAL VAGINOSIS): Primary | ICD-10-CM

## 2023-03-30 DIAGNOSIS — B37.31 CANDIDAL VULVOVAGINITIS: Primary | ICD-10-CM

## 2023-03-30 DIAGNOSIS — B37.31 CANDIDAL VULVOVAGINITIS: ICD-10-CM

## 2023-03-30 DIAGNOSIS — N89.8 VAGINAL DISCHARGE: ICD-10-CM

## 2023-03-30 PROCEDURE — 87210 SMEAR WET MOUNT SALINE/INK: CPT

## 2023-03-30 RX ORDER — FLUCONAZOLE 150 MG/1
150 TABLET ORAL ONCE
Qty: 1 TABLET | Refills: 0 | Status: SHIPPED | OUTPATIENT
Start: 2023-03-30 | End: 2023-03-30

## 2023-03-30 RX ORDER — FLUCONAZOLE 150 MG/1
TABLET ORAL
Qty: 2 TABLET | Refills: 0 | Status: SHIPPED | OUTPATIENT
Start: 2023-03-30 | End: 2023-04-05

## 2023-03-30 NOTE — TELEPHONE ENCOUNTER
SX of BV or yeast infection or both. I get them frequently. Day 2 of treatment, sx are worsening, instead of getting better. I am using the cream. I don't think it is working. I usually take Fluconazole (pill form) and it always works great.   SX: very annoying itching. I get this every other month. No vaginal discharge. I am using 2 different creams: 1 for BV and 1 for yeast.   This itching is causing me problems sleeping.   No appointments available until mid April.   Patient is requesting prescription for Fluconazole.She messaged her provider: did an EVIsit earlier today. Note found in chart review in response. Provider requested a lab only visit for wet prep. Call transferred to  to make lab appointment.     Mariluz Lazar RN Triage Nurse Advisor 9:56 PM 3/29/2023      Reason for Disposition    MODERATE-SEVERE itching (i.e., interferes with school, work, or sleep)    Additional Information    Negative: Sounds like a life-threatening emergency to the triager    Negative: Followed a genital area injury (e.g., vagina, vulva)    Negative: Foreign body in vagina (e.g., tampon)    Negative: Vaginal bleeding is main symptom    Negative: Vaginal discharge is main symptom    Negative: Pain or burning with passing urine (urination) is main symptom    Negative: Menstrual cramps is main symptom    Negative: Abdomen pain is main symptom    Negative: Pubic lice suspected    Negative: Itching or rash of external female genital area (vulva)    Negative: Labor suspected    Negative: Patient sounds very sick or weak to the triager    Negative: [1] SEVERE pain AND [2] not improved 2 hours after pain medicine    Negative: [1] Genital area looks infected (e.g., draining sore, spreading redness) AND [2] fever    Negative: [1] Something is hanging out of the vagina AND [2] can't easily be pushed back inside    Protocols used: VAGINAL SYMPTOMS-A-AH

## 2023-03-30 NOTE — TELEPHONE ENCOUNTER
Pt calls and saw wet prep result positive for yeast   infection. Would like diflucan sent to her pharmacy asap as it closes at 5pm. Provider out of office. Would you be able to send for pt?      Timur Treviño RN

## 2023-03-31 NOTE — TELEPHONE ENCOUNTER
Chart reviewed  Dr. Victor placed order for requested medication   Closing encounter    Inder Pardo RN

## 2023-04-04 RX ORDER — METRONIDAZOLE 7.5 MG/G
1 GEL VAGINAL DAILY
Qty: 70 G | Refills: 0 | Status: SHIPPED | OUTPATIENT
Start: 2023-04-04 | End: 2023-04-09

## 2023-04-05 RX ORDER — FLUCONAZOLE 150 MG/1
TABLET ORAL
Qty: 2 TABLET | Refills: 0 | Status: SHIPPED | OUTPATIENT
Start: 2023-04-05 | End: 2024-01-22

## 2023-04-24 ENCOUNTER — TELEPHONE (OUTPATIENT)
Dept: FAMILY MEDICINE | Facility: CLINIC | Age: 33
End: 2023-04-24
Payer: COMMERCIAL

## 2023-04-24 ENCOUNTER — HOSPITAL ENCOUNTER (EMERGENCY)
Facility: CLINIC | Age: 33
Discharge: LEFT BEFORE DISCHARGE | End: 2023-04-24
Payer: COMMERCIAL

## 2023-04-24 VITALS
HEART RATE: 101 BPM | TEMPERATURE: 99.1 F | HEIGHT: 67 IN | OXYGEN SATURATION: 98 % | BODY MASS INDEX: 32.18 KG/M2 | SYSTOLIC BLOOD PRESSURE: 117 MMHG | DIASTOLIC BLOOD PRESSURE: 80 MMHG | WEIGHT: 205 LBS

## 2023-04-24 DIAGNOSIS — R11.2 NAUSEA AND VOMITING, UNSPECIFIED VOMITING TYPE: Primary | ICD-10-CM

## 2023-04-24 NOTE — TELEPHONE ENCOUNTER
Patient called RN line.   Has had nausea and vomiting since 3 am. Thrown up 5 times this morning. Last episode of emesis was around 7/8 am. Threw up cake and rice from last night. Now it is bile.    Patient concerns for her kidneys as she has a kidney disorder and is not sure what signs to watch for or if the vomiting is related to her kidneys.     Last urinated this morning and it was a small amount. Thinks the last time before that was last night. No pain with urination. Always has lower back pain but there as been no change in the pain.     No noted swelling anywhere.    Had an episode of diarrhea at 4 am this morning.   Chills.   Has no checked her temp to see if she has a fever.   Upper abdominal pain 6/10 tolerable.   Abdomen is not distended or hard.   No chance of pregnancy.    Patient has tried to call her nephrologist to review symptoms and caution signs but there urgent messages can take up 2 days for a provider to answer the patients questions.   She does not currently know the status of her kidneys as it has been a while since they have been rechecked.     Has just started drinking fluids since her vomiting has started.     Would like advise on if she should be seen or what she should watch for.     Wilma Iglesias RN on 4/24/2023 at 10:10 AM

## 2023-04-24 NOTE — TELEPHONE ENCOUNTER
Call placed to Patient.  No answer.  Patient identified self on voice mail.  Left M Robbie's message.  Left call back number for Patient to return call for questions or concerns.  Iraj Neville RN

## 2023-04-24 NOTE — ED TRIAGE NOTES
Pt c/o n/v started 9 hours ago, unable to keep water down. Pt states has hx kidney failure, is not on dialysis yet. Pt took zofran at home PTA.      Triage Assessment       Row Name 04/24/23 1113       Triage Assessment (Adult)    Airway WDL WDL       Respiratory WDL    Respiratory WDL WDL       Skin Circulation/Temperature WDL    Skin Circulation/Temperature WDL WDL       Cardiac WDL    Cardiac WDL WDL       Peripheral/Neurovascular WDL    Peripheral Neurovascular WDL WDL       Cognitive/Neuro/Behavioral WDL    Cognitive/Neuro/Behavioral WDL WDL

## 2023-04-24 NOTE — TELEPHONE ENCOUNTER
I would advise she continue to focus on hydration  - water or the sugar free gatorades or powerades (to replace electrolytes if vomiting and diarrhea continue). Urine output may be less but as long as she is still going it is okay to monitor at home. Reasons for which she needs to be seen are if she cannot keep anything (any fluids) down and urine output decreases significantly - will need to go to the ED/urgent care for fluids (IV).     I can place orders to check her creatinine (kidney function) so she can do this with a lab only visit    Lea Avalos PA-C

## 2023-04-29 ENCOUNTER — HEALTH MAINTENANCE LETTER (OUTPATIENT)
Age: 33
End: 2023-04-29

## 2023-05-05 ENCOUNTER — MYC MEDICAL ADVICE (OUTPATIENT)
Dept: FAMILY MEDICINE | Facility: CLINIC | Age: 33
End: 2023-05-05

## 2023-05-05 ENCOUNTER — E-VISIT (OUTPATIENT)
Dept: FAMILY MEDICINE | Facility: CLINIC | Age: 33
End: 2023-05-05
Payer: COMMERCIAL

## 2023-05-05 DIAGNOSIS — J01.90 ACUTE SINUSITIS WITH SYMPTOMS > 10 DAYS: Primary | ICD-10-CM

## 2023-05-05 PROCEDURE — 99421 OL DIG E/M SVC 5-10 MIN: CPT | Performed by: PHYSICIAN ASSISTANT

## 2023-05-05 RX ORDER — ALBUTEROL SULFATE 0.83 MG/ML
2.5 SOLUTION RESPIRATORY (INHALATION) EVERY 6 HOURS PRN
Qty: 90 ML | Refills: 1 | Status: SHIPPED | OUTPATIENT
Start: 2023-05-05

## 2023-05-05 NOTE — PATIENT INSTRUCTIONS
Sinusitis (Antibiotic Treatment)    The sinuses are air-filled spaces within the bones of the face. They connect to the inside of the nose. Sinusitis is an inflammation of the tissue that lines the sinuses. Sinusitis can occur during a cold. It can also happen due to allergies to pollens and other particles in the air. Sinusitis can cause symptoms of sinus congestion and a feeling of fullness. A sinus infection causes fever, headache, and facial pain. There is often green or yellow fluid draining from the nose or into the back of the throat (post-nasal drip). You have been given antibiotics to treat this condition.   Home care    Take the full course of antibiotics as instructed. Don't stop taking them, even when you feel better.    Drink plenty of water, hot tea, and other liquids as directed by the healthcare provider. This may help thin nasal mucus. It also may help your sinuses drain fluids.    Heat may help soothe painful areas of your face. Use a towel soaked in hot water. Or,  the shower and direct the warm spray onto your face. Using a vaporizer along with a menthol rub at night may also help soothe symptoms.     An expectorant with guaifenesin may help thin nasal mucus and help your sinuses drain fluids. Talk with your provider or pharmacists before taking an over-the-counter (OTC) medicine if you have any questions about it or its side effects..    You can use an OTC decongestant, unless a similar medicine was prescribed to you. Nasal sprays work the fastest. Use one that contains phenylephrine or oxymetazoline. First blow your nose gently. Then use the spray. Don't use these medicines more often than directed on the label. If you do, your symptoms may get worse. You may also take pills that contain pseudoephedrine. Don t use products that combine multiple medicines. This is because side effects may be increased. Read labels. You can also ask the pharmacist for help. (People with high blood  pressure should not use decongestants. They can raise blood pressure.) Talk with your provider or pharmacist if you have any questions about the medicine..    OTC antihistamines may help if allergies contributed to your sinusitis. Talk with your provider or pharmacist if you have any questions about the medicine.    Nasal rinses or irrigation may help symptoms. It's very important to use these products only as directed. Use sterile water or sterile saline solution and not tap water. Tap water may contain germs that can cause infection in the brain. Don't rinse with excess pressure. this may spread the infection to other areas in your sinuses or head. Ask your healthcare provider or pharmacist if you have questions about using these products.    Use acetaminophen, naproxen, or ibuprofen to control pain, unless another pain medicine was prescribed to you. Talk with your healthcare provider before using these medicines if you have chronic liver or kidney disease or ever had a stomach ulcer. Never give aspirin to anyone under age 18 without first talking to their healthcare provider. It may cause severe liver damage.    Don't smoke. This can make symptoms worse.    Follow-up care  Follow up with your healthcare provider as advised.   When to seek medical advice  Call your healthcare provider if any of these occur:     Facial pain or headache that gets worse    Symptoms don't go away in 10 days    Fever of 100.4 F (38 C) or higher, or as directed by your healthcare provider  Call 911  Call 911 if any of these occur:     Seizure    Trouble breathing    Feeling dizzy or faint    Fingernails, skin, or lips look blue, purple, or gray    Severe headache that doesn't go away    Stiff neck    Unusual drowsiness or confusion    Vision problems such as blurred or double vision    Swelling of your forehead or eyelids  Prevention  Here are steps you can take to help prevent an infection:     Keep good hand washing habits.    Don t  have close contact with people who have sore throats, colds, or other upper respiratory infections.    Don t smoke, and stay away from secondhand smoke.    Stay up to date with all of your vaccines.  [employer] last reviewed this educational content on 1/1/2022 2000-2022 [employer]. All rights reserved. This information is not intended as a substitute for professional medical care. Always follow your healthcare professional's instructions.        Dear Wilma Palacios    After reviewing your responses, I've been able to diagnose you with Acute sinusitis with symptoms > 10 days.      Based on your responses and diagnosis, I have prescribed   Orders Placed This Encounter     amoxicillin-clavulanate (AUGMENTIN) 875-125 MG tablet    to treat your symptoms. I have sent this to your pharmacy.?     It is also important to stay well hydrated, get lots of rest and take over-the-counter decongestants,?tylenol?or ibuprofen if you?are able to?take those medications per your primary care provider to help relieve discomfort.?     It is important that you take?all of?your prescribed medication even if your symptoms are improving after a few doses.? Taking?all of?your medicine helps prevent the symptoms from returning.?     If your symptoms worsen, you develop severe headache, vomiting, high fever (>102), or are not improving in 7 days, please contact your primary care provider for an appointment or visit any of our convenient Walk-in Care or Urgent Care Centers to be seen which can be found on our website?here.?     Thanks again for choosing?us?as your health care partner,?   ?  Lea Avalos PA-C?

## 2023-05-16 ENCOUNTER — NURSE TRIAGE (OUTPATIENT)
Dept: NURSING | Facility: CLINIC | Age: 33
End: 2023-05-16
Payer: COMMERCIAL

## 2023-05-16 NOTE — TELEPHONE ENCOUNTER
Wilma calls and says that she finished a course of antibiotics and has vaginal itching. Pt. Says that she had Fluconazole and took 2 doses of the Fluconazole. Pt. Says that she still has the vaginal itching. Pt. Says that she wants a lab only appointment. Dr. Jackson[facility]-was paged to call this nurse at [phone], via page  at [phone]. Dr. Jackson called this nurse back and was told about Wilma's symptom and her request for a lab only appointment. Dr. Jackson says that no, he will not order a lab only appointment.  Says that he wants pt. To be seen  At an  clinic. RN called Wilma back and told pt. What Dr. Jackson said. Wilma says that she is not going to go to any  clinic. Pt. Says that she wants this message left for Dr. Avalso. RN then left this message in [facility], for Dr. Avalos, as requested.    Reason for Disposition    Itching or rash of external female genital area (vulva)    MODERATE-SEVERE itching (i.e., interferes with school, work, or sleep)    Additional Information    Negative: Sounds like a life-threatening emergency to the triager    Negative: Followed a genital area injury (e.g., vagina, vulva)    Negative: Foreign body in vagina (e.g., tampon)    Negative: Vaginal bleeding is main symptom    Negative: Vaginal discharge is main symptom    Negative: Pain or burning with passing urine (urination) is main symptom    Negative: Menstrual cramps is main symptom    Negative: Abdomen pain is main symptom    Negative: Pubic lice suspected    Negative: Followed a genital area injury (e.g., vagina, vulva)    Negative: Symptoms could be from sexual assault    Negative: Pain or burning with passing urine (urination) is main symptom    Negative: Vaginal discharge is main symptom    Negative: Pubic lice suspected    Negative: Pregnant    Negative: Patient sounds very sick or weak to the triager    Negative: [1] SEVERE pain AND [2] not improved 2 hours after pain medicine    Negative: [1]  Genital area looks infected (e.g., draining sore, spreading redness) AND [2] fever    Protocols used: VAGINAL SYMPTOMS-A-AH, VULVAR SYMPTOMS-A-AH

## 2023-05-17 ENCOUNTER — E-VISIT (OUTPATIENT)
Dept: FAMILY MEDICINE | Facility: CLINIC | Age: 33
End: 2023-05-17
Payer: COMMERCIAL

## 2023-05-17 DIAGNOSIS — N89.8 VAGINAL DISCHARGE: Primary | ICD-10-CM

## 2023-05-17 PROCEDURE — 99421 OL DIG E/M SVC 5-10 MIN: CPT | Performed by: PHYSICIAN ASSISTANT

## 2023-05-18 ENCOUNTER — LAB (OUTPATIENT)
Dept: LAB | Facility: CLINIC | Age: 33
End: 2023-05-18
Payer: COMMERCIAL

## 2023-05-18 DIAGNOSIS — N89.8 VAGINAL ITCHING: ICD-10-CM

## 2023-05-18 LAB
CLUE CELLS: PRESENT
TRICHOMONAS, WET PREP: ABNORMAL
WBC'S/HIGH POWER FIELD, WET PREP: ABNORMAL
YEAST, WET PREP: ABNORMAL

## 2023-05-18 PROCEDURE — 87210 SMEAR WET MOUNT SALINE/INK: CPT | Performed by: PHYSICIAN ASSISTANT

## 2023-05-25 ENCOUNTER — MYC MEDICAL ADVICE (OUTPATIENT)
Dept: FAMILY MEDICINE | Facility: CLINIC | Age: 33
End: 2023-05-25
Payer: COMMERCIAL

## 2023-05-25 DIAGNOSIS — N89.8 VAGINAL DISCHARGE: Primary | ICD-10-CM

## 2023-08-07 ENCOUNTER — NURSE TRIAGE (OUTPATIENT)
Dept: NURSING | Facility: CLINIC | Age: 33
End: 2023-08-07
Payer: COMMERCIAL

## 2023-08-08 NOTE — TELEPHONE ENCOUNTER
Call from mom who says she accidentally stuck herself w/ insulin needle after giving her daughter long acting insulin. Mom reports no communicable illnesses of concern.    Disposition: home care  Reviewed care advise with patient. Informed to call back w/ any questions or new concerns. Patient agrees with advise.      Marilia Lambert RN, BSN  Triage Nurse Advisor         Reason for Disposition   [1] NEEDLESTICK (or other wound from sharp object) AND [2] contaminated with blood AND [3] SOURCE person is KNOWN (family member) AND [4] SOURCE person is definitely HIV/Hepatitis NEGATIVE    Additional Information   Negative: [1] SOURCE person is HIV POSITIVE AND [2] needlestick within past 72 hours   Negative: [1] SOURCE person is HEPATITIS B POSITIVE AND [2] needlestick within past 7 days AND [3] EXPOSED person NOT fully immunized against Hepatitis B (or does not know)   Negative: [1] Puncture is on the head, neck, chest, abdomen or overlying a joint AND [2] could be deep   Negative: Sensation of something still in the wound (i.e., needle broke off)   Negative: Skin is split open or gaping (or length > 1/2 inch or 12 mm)   Negative: Epinephrine (such as from Epi-Pen) injected into hand, finger, foot, or toe (Exception: If previously discharged injector, continue triage)   Negative: Epinephrine injected into safe site (not into hand, finger, foot, or toe) (Exception: previously discharged injector)   Negative: [1] SOURCE person is HIGHER RISK (e.g., correction inmate, injection drug user) AND [2] needlestick within past 72 hours   Negative: Patient sounds very sick or weak to the triager   Negative: [1] SOURCE person is UNKNOWN (e.g., needle in garbage) AND [2] needlestick within past 72 hours   Negative: [1] NEEDLESTICK (or other wound from sharp object) AND [2] occurred while at work (Exception: clean un-used needle)   Negative: [1] SOURCE person is HIV POSITIVE AND [2] needlestick 4-7 days ago   Negative: [1] SOURCE person is  HEPATITIS B POSITIVE AND [2] needlestick within 7 days AND [3] EXPOSED person is fully Hepatitis B immunized   Negative: [1] SOURCE person is UNKNOWN (e.g., needle in garbage) AND [2] needlestick 4-7 days ago   Negative: [1] SOURCE person is HIGHER RISK (e.g., [employer] inmate, injection drug user) AND [2] needlestick 4-7 days ago   Negative: No prior tetanus shots (or is not fully vaccinated)   Negative: [1] Last tetanus shot > 5 years ago AND [2] DIRTY puncture   Negative: [1] HIV positive or severe immunodeficiency (severely weak immune system) AND [2] DIRTY puncture (e.g., object OR skin was dirty, objects on ground/floor)   Negative: [1] SOURCE person is HIV POSITIVE AND [2] needlestick > 7 days ago   Negative: [1] SOURCE person is HEPATITIS B POSITIVE AND [2] needlestick > 7 days ago   Negative: SOURCE person is HEPATITIS C POSITIVE   Negative: [1] SOURCE person is UNKNOWN (e.g., needle in garbage) AND [2] needlestick > 7 days ago   Negative: [1] Last tetanus shot > 10 years ago AND [2] clean puncture (needle AND skin were clean)    Protocols used: [id number]

## 2023-08-25 ENCOUNTER — LAB (OUTPATIENT)
Dept: LAB | Facility: CLINIC | Age: 33
End: 2023-08-25
Payer: COMMERCIAL

## 2023-08-25 DIAGNOSIS — N89.8 VAGINAL DISCHARGE: ICD-10-CM

## 2023-08-25 LAB
CLUE CELLS: NORMAL
TRICHOMONAS, WET PREP: NORMAL
WBC'S/HIGH POWER FIELD, WET PREP: NORMAL
YEAST, WET PREP: NORMAL

## 2023-08-25 PROCEDURE — 87210 SMEAR WET MOUNT SALINE/INK: CPT

## 2023-09-14 ENCOUNTER — E-VISIT (OUTPATIENT)
Dept: FAMILY MEDICINE | Facility: CLINIC | Age: 33
End: 2023-09-14
Payer: COMMERCIAL

## 2023-09-14 DIAGNOSIS — N76.0 BV (BACTERIAL VAGINOSIS): Primary | ICD-10-CM

## 2023-09-14 DIAGNOSIS — B37.31 RECURRENT CANDIDIASIS OF VAGINA: ICD-10-CM

## 2023-09-14 DIAGNOSIS — B96.89 BV (BACTERIAL VAGINOSIS): Primary | ICD-10-CM

## 2023-09-14 DIAGNOSIS — B37.31 CANDIDIASIS OF VAGINA: ICD-10-CM

## 2023-09-14 PROCEDURE — 99422 OL DIG E/M SVC 11-20 MIN: CPT | Performed by: PHYSICIAN ASSISTANT

## 2023-09-14 RX ORDER — METRONIDAZOLE 7.5 MG/G
1 GEL VAGINAL DAILY
Qty: 70 G | Refills: 3 | Status: SHIPPED | OUTPATIENT
Start: 2023-09-14

## 2023-09-14 RX ORDER — FLUCONAZOLE 150 MG/1
150 TABLET ORAL
Qty: 4 TABLET | Refills: 0 | Status: SHIPPED | OUTPATIENT
Start: 2023-09-14 | End: 2023-09-24

## 2023-09-18 RX ORDER — FLUCONAZOLE 150 MG/1
150 TABLET ORAL
Qty: 24 TABLET | Refills: 0 | Status: SHIPPED | OUTPATIENT
Start: 2023-09-18 | End: 2024-01-22

## 2023-09-27 ENCOUNTER — E-VISIT (OUTPATIENT)
Dept: FAMILY MEDICINE | Facility: CLINIC | Age: 33
End: 2023-09-27
Payer: COMMERCIAL

## 2023-09-27 DIAGNOSIS — R39.15 URINARY URGENCY: Primary | ICD-10-CM

## 2023-09-27 PROCEDURE — 99422 OL DIG E/M SVC 11-20 MIN: CPT | Performed by: PHYSICIAN ASSISTANT

## 2023-09-27 NOTE — TELEPHONE ENCOUNTER
See e visit responses but also several my chart responses to complete the visit. UA ended up NOT being suggestive of infection however patient admitted she had started on her own leftover antibiotics (macrobid) 2 days prior to collection. Decided at this point to complete full course of antibiotic treatment.         Provider E-Visit time total (minutes): 15

## 2023-10-06 ENCOUNTER — MYC MEDICAL ADVICE (OUTPATIENT)
Dept: FAMILY MEDICINE | Facility: CLINIC | Age: 33
End: 2023-10-06

## 2023-10-06 ENCOUNTER — LAB (OUTPATIENT)
Dept: LAB | Facility: CLINIC | Age: 33
End: 2023-10-06
Payer: COMMERCIAL

## 2023-10-06 DIAGNOSIS — R39.15 URINARY URGENCY: ICD-10-CM

## 2023-10-06 DIAGNOSIS — N30.01 ACUTE CYSTITIS WITH HEMATURIA: Primary | ICD-10-CM

## 2023-10-06 LAB
ALBUMIN UR-MCNC: 30 MG/DL
APPEARANCE UR: CLEAR
BACTERIA #/AREA URNS HPF: ABNORMAL /HPF
BILIRUB UR QL STRIP: NEGATIVE
COLOR UR AUTO: YELLOW
GLUCOSE UR STRIP-MCNC: NEGATIVE MG/DL
HGB UR QL STRIP: ABNORMAL
KETONES UR STRIP-MCNC: NEGATIVE MG/DL
LEUKOCYTE ESTERASE UR QL STRIP: NEGATIVE
NITRATE UR QL: NEGATIVE
PH UR STRIP: 5.5 [PH] (ref 5–7)
RBC #/AREA URNS AUTO: ABNORMAL /HPF
SP GR UR STRIP: >=1.03 (ref 1–1.03)
SQUAMOUS #/AREA URNS AUTO: ABNORMAL /LPF
UROBILINOGEN UR STRIP-ACNC: 0.2 E.U./DL
WBC #/AREA URNS AUTO: ABNORMAL /HPF

## 2023-10-06 PROCEDURE — 81001 URINALYSIS AUTO W/SCOPE: CPT

## 2023-10-06 PROCEDURE — 87086 URINE CULTURE/COLONY COUNT: CPT

## 2023-10-06 RX ORDER — NITROFURANTOIN 25; 75 MG/1; MG/1
100 CAPSULE ORAL 2 TIMES DAILY
Qty: 10 CAPSULE | Refills: 0 | Status: SHIPPED | OUTPATIENT
Start: 2023-10-06 | End: 2023-10-11

## 2023-10-08 LAB — BACTERIA UR CULT: NORMAL

## 2023-10-09 ENCOUNTER — MYC MEDICAL ADVICE (OUTPATIENT)
Dept: FAMILY MEDICINE | Facility: CLINIC | Age: 33
End: 2023-10-09

## 2023-10-13 ENCOUNTER — E-VISIT (OUTPATIENT)
Dept: FAMILY MEDICINE | Facility: CLINIC | Age: 33
End: 2023-10-13
Payer: COMMERCIAL

## 2023-10-13 DIAGNOSIS — R35.0 URINARY FREQUENCY: ICD-10-CM

## 2023-10-13 DIAGNOSIS — N39.0 RECURRENT UTI: Primary | ICD-10-CM

## 2023-10-13 PROCEDURE — 99421 OL DIG E/M SVC 5-10 MIN: CPT | Performed by: PHYSICIAN ASSISTANT

## 2023-10-13 NOTE — TELEPHONE ENCOUNTER
Call placed to Patient.  Relayed ELAN Avalos's message.  No appointments available today.  Patient will request evist.  Patient continues to be very concerned about her kidneys.  Iraj Neville RN

## 2023-10-13 NOTE — TELEPHONE ENCOUNTER
For the urinary symptoms - she will need to be seen or at the very least initiate another visit to get labs ordered. Unfortunately her last culture on 10/6 showed no growth BUT she had already started self treating with antibiotics she had leftover from a previous infection so hard to know how much that impacted her results.     For the arm symptoms - given it has been going on for months then I don't believe this is urgent and she should schedule a clinic visit in the next 1-2 weeks to address    Lea

## 2023-10-16 ENCOUNTER — LAB (OUTPATIENT)
Dept: LAB | Facility: CLINIC | Age: 33
End: 2023-10-16
Payer: COMMERCIAL

## 2023-10-16 DIAGNOSIS — R35.0 URINARY FREQUENCY: ICD-10-CM

## 2023-10-16 DIAGNOSIS — N39.0 RECURRENT UTI: ICD-10-CM

## 2023-10-16 LAB
ALBUMIN UR-MCNC: 100 MG/DL
ANION GAP SERPL CALCULATED.3IONS-SCNC: 6 MMOL/L (ref 7–15)
APPEARANCE UR: CLEAR
BILIRUB UR QL STRIP: NEGATIVE
BUN SERPL-MCNC: 13.4 MG/DL (ref 6–20)
CALCIUM SERPL-MCNC: 9.3 MG/DL (ref 8.6–10)
CHLORIDE SERPL-SCNC: 104 MMOL/L (ref 98–107)
CLUE CELLS: ABNORMAL
COLOR UR AUTO: YELLOW
CREAT SERPL-MCNC: 1.08 MG/DL (ref 0.51–0.95)
DEPRECATED HCO3 PLAS-SCNC: 30 MMOL/L (ref 22–29)
EGFRCR SERPLBLD CKD-EPI 2021: 70 ML/MIN/1.73M2
GLUCOSE SERPL-MCNC: 117 MG/DL (ref 70–99)
GLUCOSE UR STRIP-MCNC: NEGATIVE MG/DL
HBA1C MFR BLD: 5.9 % (ref 0–5.6)
HGB UR QL STRIP: ABNORMAL
KETONES UR STRIP-MCNC: NEGATIVE MG/DL
LEUKOCYTE ESTERASE UR QL STRIP: NEGATIVE
NITRATE UR QL: NEGATIVE
PH UR STRIP: 5.5 [PH] (ref 5–7)
POTASSIUM SERPL-SCNC: 3.8 MMOL/L (ref 3.4–5.3)
RBC #/AREA URNS AUTO: ABNORMAL /HPF
SODIUM SERPL-SCNC: 140 MMOL/L (ref 135–145)
SP GR UR STRIP: 1.02 (ref 1–1.03)
SQUAMOUS #/AREA URNS AUTO: ABNORMAL /LPF
TRICHOMONAS, WET PREP: ABNORMAL
UROBILINOGEN UR STRIP-ACNC: 0.2 E.U./DL
WBC #/AREA URNS AUTO: ABNORMAL /HPF
WBC'S/HIGH POWER FIELD, WET PREP: ABNORMAL
YEAST, WET PREP: ABNORMAL

## 2023-10-16 PROCEDURE — 80048 BASIC METABOLIC PNL TOTAL CA: CPT

## 2023-10-16 PROCEDURE — 81001 URINALYSIS AUTO W/SCOPE: CPT

## 2023-10-16 PROCEDURE — 83036 HEMOGLOBIN GLYCOSYLATED A1C: CPT

## 2023-10-16 PROCEDURE — 87210 SMEAR WET MOUNT SALINE/INK: CPT

## 2023-10-16 PROCEDURE — 36415 COLL VENOUS BLD VENIPUNCTURE: CPT

## 2023-10-24 ENCOUNTER — MYC MEDICAL ADVICE (OUTPATIENT)
Dept: FAMILY MEDICINE | Facility: CLINIC | Age: 33
End: 2023-10-24
Payer: COMMERCIAL

## 2023-10-24 DIAGNOSIS — R35.0 URINARY FREQUENCY: Primary | ICD-10-CM

## 2023-10-25 ENCOUNTER — TELEPHONE (OUTPATIENT)
Dept: UROLOGY | Facility: CLINIC | Age: 33
End: 2023-10-25
Payer: COMMERCIAL

## 2023-10-25 NOTE — TELEPHONE ENCOUNTER
10/25 Called patient and left voicemail. Provided patient with [phone] to call and schedule with any DEBBIE.     Tere vizcarra Procedure   Dermatology, Surgery, Urology  [facility]

## 2023-10-25 NOTE — TELEPHONE ENCOUNTER
I have not ordered uroflowmetry before which that's what it sounds like they want - when I put in the order I don't think it is correct so I am not sure who to contact to verify what the order number is and if I am allowed to order it? Not sure if we call the imaging department if they will know?     Lea

## 2023-10-25 NOTE — TELEPHONE ENCOUNTER
Received call back from Teresa at [facility] urology.  She will call patient and schedule appointment.  Will order testing through there services.  Iraj Neville RN

## 2023-10-25 NOTE — TELEPHONE ENCOUNTER
Call placed to the urologist team how to order uroflowmetry.  Left message.  Call back number given.    Call placed to imaging department.  Imaging does not no any thing about this test.  They do not do them.  Imaging stated that they believe this test needs to be done by urologist.  Will wait for return call from Urologist.  Iraj Neville RN

## 2023-10-25 NOTE — TELEPHONE ENCOUNTER
Health Call Center    Phone Message    May a detailed message be left on voicemail: yes     Reason for Call: Symptoms or Concerns     If patient has red-flag symptoms, warm transfer to triage line    Current symptom or concern: urinary frequency, constant feeling to urinate    Symptoms have been present for:  1 week(s)    Has patient previously been seen for this? Yes has seen her pcp and done lab work.    Pt scheduled with Manuel on 11/2/23. Pt asking what to do in the meantime to alleviate symptoms. Pt states she asked her PCP and was not followed up with. Please review and reach out to pt.         Are there any new or worsening symptoms? No    Action Taken: Message routed to:  Other: [place] urology    Travel Screening: Not Applicable                                                                 -

## 2023-10-25 NOTE — TELEPHONE ENCOUNTER
[facility] Call Center    Phone Message    May a detailed message be left on voicemail: yes     Reason for Call: Per LEONIE Mazariegos from [facility] calling regarding pt. Pt hasn't been seen with uro as of yet however Dr Lae Avalos placed a referral for pt to be seen for urinary frequency, and Yair states Robbie is wanting to verify if placing an order for pt to receive a uroflowmetry test is appropriate. Or should pt be seen with uro first and uro provider place test instead. Please call Yair     Action Taken: Message routed to:  Other: Uro    Travel Screening: Not Applicable

## 2023-10-26 NOTE — TELEPHONE ENCOUNTER
10/26 Patient scheduled.     Tere vizcarra Complex   Dermatology, Surgery, Urology  [facility]

## 2023-10-26 NOTE — TELEPHONE ENCOUNTER
Avoid bladder irritants, make sure she is having regular bowel movements. I agree with her PCP that she should work with pelvic floor PT.

## 2023-10-26 NOTE — TELEPHONE ENCOUNTER
Left message on identified voicemail of providers note and advised to call if she had questions.   Ruth Ann HAWLEY RN BSN PHN  Specialty Clinics

## 2023-10-31 ENCOUNTER — HOSPITAL ENCOUNTER (EMERGENCY)
Facility: CLINIC | Age: 33
Discharge: HOME | End: 2023-10-31
Attending: EMERGENCY MEDICINE | Admitting: EMERGENCY MEDICINE
Payer: COMMERCIAL

## 2023-10-31 ENCOUNTER — TELEPHONE (OUTPATIENT)
Dept: UROLOGY | Facility: CLINIC | Age: 33
End: 2023-10-31
Payer: COMMERCIAL

## 2023-10-31 VITALS
SYSTOLIC BLOOD PRESSURE: 129 MMHG | OXYGEN SATURATION: 99 % | HEART RATE: 80 BPM | BODY MASS INDEX: 32.96 KG/M2 | WEIGHT: 210 LBS | TEMPERATURE: 98.3 F | RESPIRATION RATE: 18 BRPM | HEIGHT: 67 IN | DIASTOLIC BLOOD PRESSURE: 81 MMHG

## 2023-10-31 DIAGNOSIS — N30.00 ACUTE CYSTITIS WITHOUT HEMATURIA: ICD-10-CM

## 2023-10-31 LAB
ALBUMIN SERPL BCG-MCNC: 4.4 G/DL (ref 3.5–5.2)
ALBUMIN UR-MCNC: 100 MG/DL
ALP SERPL-CCNC: 66 U/L (ref 35–104)
ALT SERPL W P-5'-P-CCNC: 12 U/L (ref 0–50)
ANION GAP SERPL CALCULATED.3IONS-SCNC: 13 MMOL/L (ref 7–15)
APPEARANCE UR: ABNORMAL
AST SERPL W P-5'-P-CCNC: 14 U/L (ref 0–45)
BACTERIA #/AREA URNS HPF: ABNORMAL /HPF
BASOPHILS # BLD AUTO: 0.1 10E3/UL (ref 0–0.2)
BASOPHILS NFR BLD AUTO: 1 %
BILIRUB SERPL-MCNC: 0.2 MG/DL
BILIRUB UR QL STRIP: NEGATIVE
BUN SERPL-MCNC: 21.2 MG/DL (ref 6–20)
CALCIUM SERPL-MCNC: 9.6 MG/DL (ref 8.6–10)
CHLORIDE SERPL-SCNC: 105 MMOL/L (ref 98–107)
COLOR UR AUTO: YELLOW
CREAT SERPL-MCNC: 1.13 MG/DL (ref 0.51–0.95)
DEPRECATED HCO3 PLAS-SCNC: 21 MMOL/L (ref 22–29)
EGFRCR SERPLBLD CKD-EPI 2021: 66 ML/MIN/1.73M2
EOSINOPHIL # BLD AUTO: 0.1 10E3/UL (ref 0–0.7)
EOSINOPHIL NFR BLD AUTO: 1 %
ERYTHROCYTE [DISTWIDTH] IN BLOOD BY AUTOMATED COUNT: 12.8 % (ref 10–15)
GLUCOSE SERPL-MCNC: 107 MG/DL (ref 70–99)
GLUCOSE UR STRIP-MCNC: NEGATIVE MG/DL
HCG UR QL: NEGATIVE
HCT VFR BLD AUTO: 40.3 % (ref 35–47)
HGB BLD-MCNC: 14 G/DL (ref 11.7–15.7)
HGB UR QL STRIP: ABNORMAL
HYALINE CASTS: 1 /LPF
IMM GRANULOCYTES # BLD: 0 10E3/UL
IMM GRANULOCYTES NFR BLD: 0 %
KETONES UR STRIP-MCNC: NEGATIVE MG/DL
LEUKOCYTE ESTERASE UR QL STRIP: ABNORMAL
LYMPHOCYTES # BLD AUTO: 3.8 10E3/UL (ref 0.8–5.3)
LYMPHOCYTES NFR BLD AUTO: 34 %
MCH RBC QN AUTO: 31.9 PG (ref 26.5–33)
MCHC RBC AUTO-ENTMCNC: 34.7 G/DL (ref 31.5–36.5)
MCV RBC AUTO: 92 FL (ref 78–100)
MONOCYTES # BLD AUTO: 0.8 10E3/UL (ref 0–1.3)
MONOCYTES NFR BLD AUTO: 7 %
MUCOUS THREADS #/AREA URNS LPF: PRESENT /LPF
NEUTROPHILS # BLD AUTO: 6.6 10E3/UL (ref 1.6–8.3)
NEUTROPHILS NFR BLD AUTO: 57 %
NITRATE UR QL: NEGATIVE
NRBC # BLD AUTO: 0 10E3/UL
NRBC BLD AUTO-RTO: 0 /100
PH UR STRIP: 5 [PH] (ref 5–7)
PLATELET # BLD AUTO: 315 10E3/UL (ref 150–450)
POTASSIUM SERPL-SCNC: 3.8 MMOL/L (ref 3.4–5.3)
PROT SERPL-MCNC: 7.5 G/DL (ref 6.4–8.3)
RBC # BLD AUTO: 4.39 10E6/UL (ref 3.8–5.2)
RBC URINE: 10 /HPF
SODIUM SERPL-SCNC: 139 MMOL/L (ref 135–145)
SP GR UR STRIP: 1.03 (ref 1–1.03)
SQUAMOUS EPITHELIAL: 1 /HPF
UROBILINOGEN UR STRIP-MCNC: NORMAL MG/DL
WBC # BLD AUTO: 11.3 10E3/UL (ref 4–11)
WBC URINE: 59 /HPF

## 2023-10-31 PROCEDURE — 76705 ECHO EXAM OF ABDOMEN: CPT | Performed by: EMERGENCY MEDICINE

## 2023-10-31 PROCEDURE — 80053 COMPREHEN METABOLIC PANEL: CPT | Performed by: EMERGENCY MEDICINE

## 2023-10-31 PROCEDURE — 36415 COLL VENOUS BLD VENIPUNCTURE: CPT | Performed by: FAMILY MEDICINE

## 2023-10-31 PROCEDURE — 81001 URINALYSIS AUTO W/SCOPE: CPT | Performed by: EMERGENCY MEDICINE

## 2023-10-31 PROCEDURE — 76775 US EXAM ABDO BACK WALL LIM: CPT | Mod: 26 | Performed by: EMERGENCY MEDICINE

## 2023-10-31 PROCEDURE — 85025 COMPLETE CBC W/AUTO DIFF WBC: CPT | Performed by: FAMILY MEDICINE

## 2023-10-31 PROCEDURE — 87086 URINE CULTURE/COLONY COUNT: CPT | Performed by: EMERGENCY MEDICINE

## 2023-10-31 PROCEDURE — 81025 URINE PREGNANCY TEST: CPT | Performed by: EMERGENCY MEDICINE

## 2023-10-31 PROCEDURE — 76775 US EXAM ABDO BACK WALL LIM: CPT | Performed by: EMERGENCY MEDICINE

## 2023-10-31 PROCEDURE — 85025 COMPLETE CBC W/AUTO DIFF WBC: CPT | Performed by: EMERGENCY MEDICINE

## 2023-10-31 PROCEDURE — 99284 EMERGENCY DEPT VISIT MOD MDM: CPT | Mod: 25 | Performed by: EMERGENCY MEDICINE

## 2023-10-31 PROCEDURE — 76705 ECHO EXAM OF ABDOMEN: CPT | Mod: 26 | Performed by: EMERGENCY MEDICINE

## 2023-10-31 RX ORDER — CEPHALEXIN 500 MG/1
1000 CAPSULE ORAL 2 TIMES DAILY
Qty: 20 CAPSULE | Refills: 0 | Status: SHIPPED | OUTPATIENT
Start: 2023-10-31 | End: 2024-04-03

## 2023-10-31 NOTE — TELEPHONE ENCOUNTER
[facility] Call Center    Phone Message    May a detailed message be left on voicemail: yes     Reason for Call: Pt scheduled or urinary frequency 11/2 and had spoke with a nurse to help with symptoms. She stated that she's been up about every 30 minutes with the urge to urinate not going away and is wondering if she can get some advise. Please call pt Thank ou    Action Taken: Message routed to:  Other: Uro    Travel Screening: Not Applicable

## 2023-10-31 NOTE — TELEPHONE ENCOUNTER
Call placed to Patient.  No answer.  Left message with call back number for Patient to return call.  Iraj Neville RN

## 2023-10-31 NOTE — TELEPHONE ENCOUNTER
It's not that I didn't respond on 10/25 - this is out of my area of expertise and I don't know what to recommend given her history and symptoms. The last thing I want to do is recommend something that makes things worse or irritates her kidneys further. Urology messaged 10/19 and again 10/25 and she has an appointment with them in 2 days.     Lea Avalos PA-C

## 2023-10-31 NOTE — TELEPHONE ENCOUNTER
Call placed to patient, was given general info previously as well as PCP advise.  Due to pt already diag Kidney disease  and her report of feeling extra full and distended, urinating very small volumes and feels like she will urinate immediately after when standing up off the toilet but cannot: advised pt of triager concern for retention and possible RIGO with already kidney diag. (Recent rise in Creatinine already per labs 2 wekks ago)   Recommended pt be seen in UC/ER.  Patient agrees and states understanding.    Rosa Maria BERMAN   Specialty [facility] RN

## 2023-10-31 NOTE — TELEPHONE ENCOUNTER
Patient called back. Spoke with GATO Tim RN and was given message.   Wilma Iglesias RN on 10/31/2023 at 5:26 PM

## 2023-11-01 NOTE — ED NOTES
Pt given specimen cup to collect urine sample in ED waiting with instructions on how to collect.  Pt gave verbal understanding.

## 2023-11-01 NOTE — ED PROVIDER NOTES
History     Chief Complaint   Patient presents with    Urinary Retention     HPI  Wilma Palacios is a 33 year old female who presents for urinary frequency and urgency.  Symptoms have been ongoing for several weeks but became much worse since last evening and throughout the day today.  She could not sleep overnight because she felt like she had to constantly get up and go to the bathroom.  No dysuria or visible hematuria.  She denies any fevers, chills, nausea, vomiting, diarrhea.  No vaginal bleeding or discharge.  No abdominal pain.    I reviewed the patient's ED visit with family practice on 10/13/2023.  She had a urinalysis that had blood in it but no signs of infection.  She had a mild elevation of her creatinine from her baseline up to 1.08.  Urine culture at that time was negative.  She has urology follow-up in 2 days.    Allergies:  Allergies   Allergen Reactions    Acetic Acid Anaphylaxis, Itching and Rash    Ascorbate Anaphylaxis     All Fruits.    Food Other (See Comments)     Berries (fruit)  Throat closes, itchy    Lycopene      Other reaction(s): Angioedema    Nuts Swelling and Nausea and Vomiting    Pistachio Nut Extract Skin Test Anaphylaxis    Sulfa Antibiotics     Other Drug Allergy (See Comments) Hives and Rash     BLEACH       Problem List:    Patient Active Problem List    Diagnosis Date Noted    Drug-induced immunodeficiency  02/24/2022     Priority: Medium    Subchorionic hemorrhage in first trimester 01/22/2022     Priority: Medium    Clinical diagnosis of COVID-19 05/03/2021     Priority: Medium    Cervical cancer screening 11/25/2020     Priority: Medium     2009 NIL pap  2013 NIL pap.  2014 NIL pap.  2017 NIL pap  11/19/20 NIL pap. Plan cotest in 1 year due bef 11/19/21 due to immunosuppression.   9/9/21 NIL Pap, Neg HPV. Plan cotest in 1 year. If NIL Pap, Neg HPV, then every 3 years.   8/19/22 Pt currently pregnant, seeing [facility]. Scheduled c/s on 9/1. Will send reminders after  delivery.  22 Reminder mychart  22 [facility] office note :Pap smear NILM 2021. Repeat with HPV co-testing in .        Rheumatoid arthritis (H) 2020     Priority: Medium    Chronic chest wall pain 2018     Priority: Medium     Overview:   Secondary to spondyloarthropathy       HLA-B27 spondyloarthropathy 2018     Priority: Medium     Overview:   2nd opinion [facility] at [facility], [facility]       Ankylosing spondylitis of multiple sites in spine (H) 10/04/2018     Priority: Medium     Rheumatology note from [facility] (1/15/19)  scanned into Epic.  Does not think there are any contraindications for vaginal birth.  Follow up with them 32-34 wks.  Pneumonia vaccine in 2nd or 3rd trimester.  Also follow up 6-8 weeks after delivery, sooner as needed      Anxiety 2017     Priority: Medium    Allergic rhinitis 2014     Priority: Medium    Hypothyroidism 2013     Priority: Medium        Past Medical History:    Past Medical History:   Diagnosis Date    Ankylosing spondylitis with multisystem involvement (H) 2012    Anxiety     Thyroid disease        Past Surgical History:    Past Surgical History:   Procedure Laterality Date     SECTION  2019    COLONOSCOPY  2015    COLONOSCOPY      EXTRACTION(S) DENTAL  2009       Family History:    Family History   Problem Relation Age of Onset    Skin Cancer Maternal Grandfather     Rheumatoid Arthritis Maternal Grandfather     Heart Disease Paternal Grandmother        Social History:  Marital Status:   [2]  Social History     Tobacco Use    Smoking status: Former     Packs/day: 0.00     Years: 0.00     Additional pack years: 0.00     Total pack years: 0.00     Types: Cigarettes     Quit date: 2013     Years since quitting: 10.8    Smokeless tobacco: Never   Vaping Use    Vaping Use: Never used   Substance Use Topics    Alcohol use: Not Currently     Comment: rarely    Drug use: No        Medications:   "  cephALEXin (KEFLEX) 500 MG capsule  albuterol (PROVENTIL) (2.5 MG/3ML) 0.083% neb solution  ENBREL MINI 50 MG/ML SOCT  fluconazole (DIFLUCAN) 150 MG tablet  fluconazole (DIFLUCAN) 150 MG tablet  fluticasone (FLOVENT HFA) 110 MCG/ACT inhaler  metroNIDAZOLE (METROGEL) 0.75 % vaginal gel  Prenatal MV & Min w/FA-DHA (PRENATAL GUMMIES PO)  Prenatal Vit-Fe Fumarate-FA (PNV PRENATAL PLUS MULTIVITAMIN) 27-1 MG TABS per tablet  SYNTHROID 112 MCG tablet          Review of Systems    Physical Exam   BP: (!) 151/105  Pulse: 81  Temp: 98.3  F (36.8  C)  Resp: 18  Height: 170.2 cm (5' 7\")  Weight: 95.3 kg (210 lb)  SpO2: 99 %      Physical Exam  Vitals and nursing note reviewed.   Constitutional:       Appearance: She is well-developed. She is not diaphoretic.   HENT:      Head: Normocephalic and atraumatic.      Right Ear: External ear normal.      Left Ear: External ear normal.      Nose: Nose normal.   Eyes:      General: No scleral icterus.     Conjunctiva/sclera: Conjunctivae normal.   Cardiovascular:      Rate and Rhythm: Normal rate and regular rhythm.   Pulmonary:      Effort: Pulmonary effort is normal. No respiratory distress.      Breath sounds: No stridor.   Abdominal:      General: There is no distension.      Palpations: Abdomen is soft.      Tenderness: There is no abdominal tenderness. There is no guarding or rebound.   Musculoskeletal:      Cervical back: Normal range of motion.   Skin:     General: Skin is warm and dry.   Neurological:      Mental Status: She is alert and oriented to person, place, and time.   Psychiatric:         Behavior: Behavior normal.         ED Course                 Procedures    Results for orders placed during the hospital encounter of 10/31/23    POC US RETROPERITONEAL LIMITED    Impression  [facility] Procedure Note  Limited Bedside ED Renal Ultrasound:    PERFORMED BY: Dr. Colin Vizcarra MD  INDICATIONS:  Inability to void  PROBE: Low frequency convex probe  BODY " LOCATION:  Abdomen  FINDINGS:  The ultrasound was performed with longitudinal and transverse views.  Right Kidney:  Hydronephrosis:  None  Renal cyst:  None  Left Kidney:  Hydronephrosis:  None  Renal cyst:  None  INTERPRETATION:  The evaluation of the kidneys was normal without evidence of hydronephrosis or cysts.  IMAGE DOCUMENTATION: Images were archived to PACs system.    [facility] Procedure Note    Limited Bedside ED Ultrasound of the Urinary Bladder:    PERFORMED BY: Dr. Colin Vizcarra MD  INDICATIONS:  Possible obstrucion and/or mass  PROBE: Low frequency convex probe  BODY LOCATION:  Abdomen  FINDINGS:  Visualization of the bladder in longitudinal and transverse views demonstrated a contracted state.  INTERPRETATION:  Total calculated volume was estimated to be almost completely empty.  The evaluation was normal without evidence of obstruction or mass.  No bladder distention noted.  IMAGE DOCUMENTATION: Images were archived to PACs system.            Critical Care time:  none               Results for orders placed or performed during the hospital encounter of 10/31/23 (from the past 24 hour(s))   CBC with platelets, differential    Narrative    The following orders were created for panel order CBC with platelets, differential.  Procedure                               Abnormality         Status                     ---------                               -----------         ------                     CBC with platelets and d...[[id number]]  Abnormal            Final result                 Please view results for these tests on the individual orders.   Comprehensive metabolic panel   Result Value Ref Range    Sodium 139 135 - 145 mmol/L    Potassium 3.8 3.4 - 5.3 mmol/L    Carbon Dioxide (CO2) 21 (L) 22 - 29 mmol/L    Anion Gap 13 7 - 15 mmol/L    Urea Nitrogen 21.2 (H) 6.0 - 20.0 mg/dL    Creatinine 1.13 (H) 0.51 - 0.95 mg/dL    GFR Estimate 66 >60 mL/min/1.73m2    Calcium 9.6 8.6 - 10.0 mg/dL     Chloride 105 98 - 107 mmol/L    Glucose 107 (H) 70 - 99 mg/dL    Alkaline Phosphatase 66 35 - 104 U/L    AST 14 0 - 45 U/L    ALT 12 0 - 50 U/L    Protein Total 7.5 6.4 - 8.3 g/dL    Albumin 4.4 3.5 - 5.2 g/dL    Bilirubin Total 0.2 <=1.2 mg/dL   CBC with platelets and differential   Result Value Ref Range    WBC Count 11.3 (H) 4.0 - 11.0 10e3/uL    RBC Count 4.39 3.80 - 5.20 10e6/uL    Hemoglobin 14.0 11.7 - 15.7 g/dL    Hematocrit 40.3 35.0 - 47.0 %    MCV 92 78 - 100 fL    MCH 31.9 26.5 - 33.0 pg    MCHC 34.7 31.5 - 36.5 g/dL    RDW 12.8 10.0 - 15.0 %    Platelet Count 315 150 - 450 10e3/uL    % Neutrophils 57 %    % Lymphocytes 34 %    % Monocytes 7 %    % Eosinophils 1 %    % Basophils 1 %    % Immature Granulocytes 0 %    NRBCs per 100 WBC 0 <1 /100    Absolute Neutrophils 6.6 1.6 - 8.3 10e3/uL    Absolute Lymphocytes 3.8 0.8 - 5.3 10e3/uL    Absolute Monocytes 0.8 0.0 - 1.3 10e3/uL    Absolute Eosinophils 0.1 0.0 - 0.7 10e3/uL    Absolute Basophils 0.1 0.0 - 0.2 10e3/uL    Absolute Immature Granulocytes 0.0 <=0.4 10e3/uL    Absolute NRBCs 0.0 10e3/uL   HCG qualitative urine   Result Value Ref Range    hCG Urine Qualitative Negative Negative   Urine Macroscopic with reflex to Microscopic   Result Value Ref Range    Color Urine Yellow Colorless, Straw, Light Yellow, Yellow    Appearance Urine Slightly Cloudy (A) Clear    Glucose Urine Negative Negative mg/dL    Bilirubin Urine Negative Negative    Ketones Urine Negative Negative mg/dL    Specific Gravity Urine 1.027 1.003 - 1.035    Blood Urine Moderate (A) Negative    pH Urine 5.0 5.0 - 7.0    Protein Albumin Urine 100 (A) Negative mg/dL    Urobilinogen Urine Normal Normal, 2.0 mg/dL    Nitrite Urine Negative Negative    Leukocyte Esterase Urine Moderate (A) Negative    Bacteria Urine Few (A) None Seen /HPF    RBC Urine 10 (H) <=2 /HPF    WBC Urine 59 (H) <=5 /HPF    Squamous Epithelials Urine 1 <=1 /HPF    Mucus Urine Present (A) None Seen /LPF    Hyaline  Casts Urine 1 <=2 /LPF   POC US RETROPERITONEAL LIMITED    Impression    [facility] Procedure Note    Limited Bedside ED Renal Ultrasound:    PERFORMED BY: Dr. Colin Vizcarra MD  INDICATIONS:  Inability to void  PROBE: Low frequency convex probe  BODY LOCATION:  Abdomen  FINDINGS:  The ultrasound was performed with longitudinal and transverse views.   Right Kidney:   Hydronephrosis:  None   Renal cyst:  None  Left Kidney:   Hydronephrosis:  None   Renal cyst:  None  INTERPRETATION:  The evaluation of the kidneys was normal without evidence of hydronephrosis or cysts.  IMAGE DOCUMENTATION: Images were archived to PACs system.    [facility] Procedure Note      Limited Bedside ED Ultrasound of the Urinary Bladder:    PERFORMED BY: Dr. Colin Vizcarra MD  INDICATIONS:  Possible obstrucion and/or mass  PROBE: Low frequency convex probe  BODY LOCATION:  Abdomen  FINDINGS:  Visualization of the bladder in longitudinal and transverse views demonstrated a contracted state.   INTERPRETATION:  Total calculated volume was estimated to be almost completely empty.  The evaluation was normal without evidence of obstruction or mass.  No bladder distention noted.  IMAGE DOCUMENTATION: Images were archived to PACs system.          Medications - No data to display    Assessments & Plan (with Medical Decision Making)   33-year-old female who presents with urgency and frequency.  Urinalysis is positive for white blood cells and leukocyte esterase, negative nitrite.  Urine culture is pending.  Bedside ultrasound shows no hydronephrosis or obstruction of the bladder.  She has a fully compressed bladder after urinating.  Pregnancy is negative, unlikely ectopic pregnancy.  She has a mild leukocytosis of 11.3, nonspecific.  Electrolytes are within normal limits and her BUN and creatinine are just slightly up from what they were in her prior visit with family practice, no significant change.  Likely acute cystitis  as a cause of her symptoms this time that have made her symptoms worse over the past 24 hours.  She is safe to discharge home with a prescription for cephalexin and instructions to return if she has worsening of her symptoms or other concerns, otherwise follow-up in clinic.  The patient is in agreement with this plan.    I have reviewed the nursing notes.    I have reviewed the findings, diagnosis, plan and need for follow up with the patient.           New Prescriptions    CEPHALEXIN (KEFLEX) 500 MG CAPSULE    Take 2 capsules (1,000 mg) by mouth 2 times daily       Final diagnoses:   Acute cystitis without hematuria       10/31/2023   [facility] EMERGENCY DEPT       Colin Vizcarra MD  10/31/23 4099

## 2023-11-01 NOTE — ED TRIAGE NOTES
Urinary frequency and retention.  Urology appointment in 2 days.   Urology wanted pt to be seen in ED     Triage Assessment (Adult)       Row Name 10/31/23 2048          Triage Assessment    Airway WDL WDL        Respiratory WDL    Respiratory WDL WDL

## 2023-11-01 NOTE — DISCHARGE INSTRUCTIONS
I believe your symptoms are worse now because you have a bladder infection.  Take the cephalexin twice a day for 5 days.  Return to the emergency department for fevers, repeated vomiting, worsening pain, or other concerns.  Use acetaminophen or ibuprofen for your symptoms.  Drink plenty of fluids.  Follow-up in urology clinic as scheduled.

## 2023-11-02 ENCOUNTER — OFFICE VISIT (OUTPATIENT)
Dept: UROLOGY | Facility: CLINIC | Age: 33
End: 2023-11-02
Attending: STUDENT IN AN ORGANIZED HEALTH CARE EDUCATION/TRAINING PROGRAM
Payer: COMMERCIAL

## 2023-11-02 VITALS
BODY MASS INDEX: 32.96 KG/M2 | HEIGHT: 67 IN | OXYGEN SATURATION: 95 % | TEMPERATURE: 98.6 F | SYSTOLIC BLOOD PRESSURE: 106 MMHG | WEIGHT: 210 LBS | HEART RATE: 67 BPM | DIASTOLIC BLOOD PRESSURE: 69 MMHG

## 2023-11-02 DIAGNOSIS — R35.0 URINARY FREQUENCY: ICD-10-CM

## 2023-11-02 LAB — BACTERIA UR CULT: NORMAL

## 2023-11-02 PROCEDURE — 51798 US URINE CAPACITY MEASURE: CPT | Performed by: STUDENT IN AN ORGANIZED HEALTH CARE EDUCATION/TRAINING PROGRAM

## 2023-11-02 PROCEDURE — 99203 OFFICE O/P NEW LOW 30 MIN: CPT | Mod: 25 | Performed by: STUDENT IN AN ORGANIZED HEALTH CARE EDUCATION/TRAINING PROGRAM

## 2023-11-02 RX ORDER — HYDROXYZINE HYDROCHLORIDE 25 MG/1
25 TABLET, FILM COATED ORAL
COMMUNITY

## 2023-11-02 RX ORDER — PREDNISONE 10 MG/1
10 TABLET ORAL
COMMUNITY
Start: 2023-06-23 | End: 2024-04-03

## 2023-11-02 RX ORDER — MIRABEGRON 25 MG/1
25 TABLET, FILM COATED, EXTENDED RELEASE ORAL DAILY
Qty: 30 TABLET | Refills: 3 | Status: SHIPPED | OUTPATIENT
Start: 2023-11-02 | End: 2024-03-25

## 2023-11-02 RX ORDER — LOSARTAN POTASSIUM 100 MG/1
100 TABLET ORAL
COMMUNITY
Start: 2023-10-19 | End: 2024-10-18

## 2023-11-02 ASSESSMENT — PAIN SCALES - GENERAL: PAINLEVEL: NO PAIN (0)

## 2023-11-02 NOTE — NURSING NOTE
"Initial /69   Pulse 67   Temp 98.6  F (37  C) (Tympanic)   Ht 1.702 m (5' 7\")   Wt 95.3 kg (210 lb)   SpO2 95%   BMI 32.89 kg/m   Estimated body mass index is 32.89 kg/m  as calculated from the following:    Height as of this encounter: 1.702 m (5' 7\").    Weight as of this encounter: 95.3 kg (210 lb). .  Active order to obtain bladder scan? Yes   Name of ordering provider:  Renetta Jackson  Bladder scan preformed post void Yes.  Bladder scan reveled 36ML  Provider notified?  Yes    Radha LEO CMA        "

## 2023-11-02 NOTE — PROGRESS NOTES
Chief Complaint:   Urinary frequency         History of Present Illness:   Wilma Palacios is a 33 year old female with a history of rheumatoid arthritis, ankylosing spondylitis, and hypothyroidism who presents for evaluation of urinary frequency.     The patient reports a one month history of urinary frequency and urgency.  She will urinate every hour, day and night. She also reports a sensation of incomplete bladder emptying. She denies dysuria, incontinence, and gross hematuria. She denies straining to void.     She was evaluated for microscopic hematuria by [facility] in  and for gross hematuria in 2022. She had a normal cystoscopy.     She presented to the ED on 10/31/2023 for urinary frequency and urgency. UA was notable for 10 RBCs and 59 WBCs. She was prescribed Keflex, though urine culture was negative.     She reports regular bowel movements. She has a bowel movement every other day that is easy to pass.     She drinks water and 1 cup of coffee. She has cut out soda.     She follows with Nephrology at [facility].          Past Medical History:     Past Medical History:   Diagnosis Date    Ankylosing spondylitis with multisystem involvement (H)     Anxiety     Thyroid disease             Past Surgical History:     Past Surgical History:   Procedure Laterality Date     SECTION      COLONOSCOPY  2015    COLONOSCOPY      EXTRACTION(S) DENTAL  2009            Medications     Current Outpatient Medications   Medication    albuterol (PROVENTIL) (2.5 MG/3ML) 0.083% neb solution    cephALEXin (KEFLEX) 500 MG capsule    ENBREL MINI 50 MG/ML SOCT    fluconazole (DIFLUCAN) 150 MG tablet    fluconazole (DIFLUCAN) 150 MG tablet    fluticasone (FLOVENT HFA) 110 MCG/ACT inhaler    metroNIDAZOLE (METROGEL) 0.75 % vaginal gel    Prenatal MV & Min w/FA-DHA (PRENATAL GUMMIES PO)    Prenatal Vit-Fe Fumarate-FA (PNV PRENATAL PLUS MULTIVITAMIN) 27-1 MG TABS per tablet    SYNTHROID 112 MCG tablet  "    Current Facility-Administered Medications   Medication    betamethasone acet & sod phos (CELESTONE) injection 6 mg    ropivacaine (NAROPIN) injection 1 mL            Allergies:   Acetic acid, Ascorbate, Food, Lycopene, Nuts, Pistachio nut extract skin test, Sulfa antibiotics, and Other drug allergy (see comments)         Review of Systems:  From intake questionnaire   Negative 14 system review except as noted on HPI, nurse's note.         Physical Exam:   Patient is a 33 year old  female   Vitals: Blood pressure 106/69, pulse 67, temperature 98.6  F (37  C), temperature source Tympanic, height 1.702 m (5' 7\"), weight 95.3 kg (210 lb), SpO2 95%, unknown if currently breastfeeding.  General Appearance Adult: Alert, no acute distress, oriented.  Lungs: Non-labored breathing.  Heart: No obvious jugular venous distension present.  Neuro: Alert, oriented, speech and mentation normal    PVR: 36 mL      Labs and Pathology:    I personally reviewed all applicable laboratory data and went over findings with patient  Significant for:    CBC RESULTS:  Recent Labs   Lab Test 10/31/23  2059 02/24/22  1119 02/06/22  0827 01/27/22  1036   WBC 11.3* 8.7 9.3 7.0   HGB 14.0 11.2* 13.1 13.1    349 231 269        BMP RESULTS:  Recent Labs   Lab Test 10/31/23  2059 10/16/23  1535 02/24/22  1119 02/06/22  0827 01/20/22  0955 04/05/21  1405 09/14/20  1548 12/17/19  1400 03/26/19  0000    140 139 137   < >  --   --  141  --    POTASSIUM 3.8 3.8 3.6 4.0   < >  --   --  3.6  --    CHLORIDE 105 104 103 107   < >  --   --  108*  --    CO2 21* 30* 26 25   < >  --   --  24  --    ANIONGAP 13 6* 10 5   < >  --   --  9  --    * 117* 80 119*   < >  --   --  127*  --    BUN 21.2* 13.4 10 15   < >  --   --  7*  --    CR 1.13* 1.08* 0.81 0.80   < > 0.96 0.75 0.96 0.8   GFRESTIMATED 66 70 >90 >90   < > 80 >90 >60 >90   GFRESTBLACK  --   --   --   --   --  >90 >90 >60 >90   ABBI 9.6 9.3 9.2 9.0   < >  --   --  9.1  --     < > = " values in this interval not displayed.       UA RESULTS:   Recent Labs   Lab Test 10/31/23  2137 10/16/23  1535 10/06/23  1329   SG 1.027 1.025 >=1.030   URINEPH 5.0 5.5 5.5   NITRITE Negative Negative Negative   RBCU 10* 10-25* 10-25*   WBCU 59* 0-5 0-5            Assessment and Plan:     Assessment: 33 year old female with a one month history of increased urinary frequency, urgency, and nocturia. She has been evaluated by [facility] for gross hematuria in the past. Workups have been unremarkable.     She presented to the ED on 10/31/2023 for urinary frequency and urgency. UA was notable for 10 RBCs and 59 WBCs. She was prescribed Keflex, though urine culture was negative.     We discussed possible causes for her symptoms including overactive bladder and pelvic floor dysfunction. Her symptoms did start shortly after her daughter was diagnosed with T1 DM, which has been understandably stressful. We discussed a trial of an overactive bladder medication vs pelvic floor PT referral vs further evaluation with urodynamics testing. She would like to try a medication and proceed with urodynamics testing.     Plan:  Start Myrbetriq 25 mg every evening. Side effects reviewed.   Urodynamics testing for evaluation of urinary frequency and urgency.   Follow up after urodynamics testing to determine a plan moving forward.     JABARI CARTER PA-C  Department of Urology

## 2023-11-06 ENCOUNTER — TELEPHONE (OUTPATIENT)
Dept: UROLOGY | Facility: CLINIC | Age: 33
End: 2023-11-06
Payer: COMMERCIAL

## 2023-11-06 NOTE — TELEPHONE ENCOUNTER
----- Message from Renetta Jackson PA-C sent at 11/2/2023  3:30 PM CDT -----  Regarding: Urodynamics  Could you please schedule this patient for urodynamics at the ?

## 2023-11-10 ENCOUNTER — PRE VISIT (OUTPATIENT)
Dept: UROLOGY | Facility: CLINIC | Age: 33
End: 2023-11-10
Payer: COMMERCIAL

## 2023-11-14 ENCOUNTER — LAB (OUTPATIENT)
Dept: LAB | Facility: CLINIC | Age: 33
End: 2023-11-14
Payer: COMMERCIAL

## 2023-11-14 ENCOUNTER — E-VISIT (OUTPATIENT)
Dept: FAMILY MEDICINE | Facility: CLINIC | Age: 33
End: 2023-11-14
Payer: COMMERCIAL

## 2023-11-14 DIAGNOSIS — F41.9 ANXIETY AND DEPRESSION: Primary | ICD-10-CM

## 2023-11-14 DIAGNOSIS — M45.9 ANKYLOSING SPONDYLITIS (H): ICD-10-CM

## 2023-11-14 DIAGNOSIS — F32.A ANXIETY AND DEPRESSION: Primary | ICD-10-CM

## 2023-11-14 DIAGNOSIS — M45.9 ANKYLOSING SPONDYLITIS (H): Primary | ICD-10-CM

## 2023-11-14 LAB
ALBUMIN UR-MCNC: 100 MG/DL
APPEARANCE UR: ABNORMAL
AST SERPL W P-5'-P-CCNC: 18 U/L (ref 0–45)
BACTERIA #/AREA URNS HPF: ABNORMAL /HPF
BASOPHILS # BLD AUTO: 0 10E3/UL (ref 0–0.2)
BASOPHILS NFR BLD AUTO: 1 %
BILIRUB UR QL STRIP: NEGATIVE
COLOR UR AUTO: YELLOW
CREAT SERPL-MCNC: 0.93 MG/DL (ref 0.51–0.95)
CRP SERPL-MCNC: <3 MG/L
EGFRCR SERPLBLD CKD-EPI 2021: 84 ML/MIN/1.73M2
EOSINOPHIL # BLD AUTO: 0.2 10E3/UL (ref 0–0.7)
EOSINOPHIL NFR BLD AUTO: 3 %
ERYTHROCYTE [DISTWIDTH] IN BLOOD BY AUTOMATED COUNT: 12.6 % (ref 10–15)
ERYTHROCYTE [SEDIMENTATION RATE] IN BLOOD BY WESTERGREN METHOD: 9 MM/HR (ref 0–20)
GLUCOSE UR STRIP-MCNC: NEGATIVE MG/DL
HCT VFR BLD AUTO: 38.7 % (ref 35–47)
HGB BLD-MCNC: 13.3 G/DL (ref 11.7–15.7)
HGB UR QL STRIP: ABNORMAL
IMM GRANULOCYTES # BLD: 0 10E3/UL
IMM GRANULOCYTES NFR BLD: 0 %
KETONES UR STRIP-MCNC: NEGATIVE MG/DL
LEUKOCYTE ESTERASE UR QL STRIP: ABNORMAL
LYMPHOCYTES # BLD AUTO: 2 10E3/UL (ref 0.8–5.3)
LYMPHOCYTES NFR BLD AUTO: 35 %
MCH RBC QN AUTO: 31 PG (ref 26.5–33)
MCHC RBC AUTO-ENTMCNC: 34.4 G/DL (ref 31.5–36.5)
MCV RBC AUTO: 90 FL (ref 78–100)
MONOCYTES # BLD AUTO: 0.4 10E3/UL (ref 0–1.3)
MONOCYTES NFR BLD AUTO: 6 %
NEUTROPHILS # BLD AUTO: 3.2 10E3/UL (ref 1.6–8.3)
NEUTROPHILS NFR BLD AUTO: 55 %
NITRATE UR QL: NEGATIVE
PH UR STRIP: 6 [PH] (ref 5–7)
PLATELET # BLD AUTO: 288 10E3/UL (ref 150–450)
RBC # BLD AUTO: 4.29 10E6/UL (ref 3.8–5.2)
RBC #/AREA URNS AUTO: ABNORMAL /HPF
SP GR UR STRIP: >=1.03 (ref 1–1.03)
SQUAMOUS #/AREA URNS AUTO: ABNORMAL /LPF
UROBILINOGEN UR STRIP-ACNC: 0.2 E.U./DL
WBC # BLD AUTO: 5.8 10E3/UL (ref 4–11)
WBC #/AREA URNS AUTO: ABNORMAL /HPF
WBC CLUMPS #/AREA URNS HPF: PRESENT /HPF

## 2023-11-14 PROCEDURE — 85025 COMPLETE CBC W/AUTO DIFF WBC: CPT

## 2023-11-14 PROCEDURE — 81001 URINALYSIS AUTO W/SCOPE: CPT

## 2023-11-14 PROCEDURE — 85652 RBC SED RATE AUTOMATED: CPT

## 2023-11-14 PROCEDURE — 36415 COLL VENOUS BLD VENIPUNCTURE: CPT

## 2023-11-14 PROCEDURE — 82565 ASSAY OF CREATININE: CPT

## 2023-11-14 PROCEDURE — 86140 C-REACTIVE PROTEIN: CPT

## 2023-11-14 PROCEDURE — 87086 URINE CULTURE/COLONY COUNT: CPT

## 2023-11-14 PROCEDURE — 87186 SC STD MICRODIL/AGAR DIL: CPT

## 2023-11-14 PROCEDURE — 84450 TRANSFERASE (AST) (SGOT): CPT

## 2023-11-14 PROCEDURE — 99207 PR NON-BILLABLE SERV PER CHARTING: CPT | Performed by: PHYSICIAN ASSISTANT

## 2023-11-14 ASSESSMENT — ANXIETY QUESTIONNAIRES
GAD7 TOTAL SCORE: 6
GAD7 TOTAL SCORE: 6
5. BEING SO RESTLESS THAT IT IS HARD TO SIT STILL: SEVERAL DAYS
4. TROUBLE RELAXING: SEVERAL DAYS
1. FEELING NERVOUS, ANXIOUS, OR ON EDGE: SEVERAL DAYS
7. FEELING AFRAID AS IF SOMETHING AWFUL MIGHT HAPPEN: NOT AT ALL
6. BECOMING EASILY ANNOYED OR IRRITABLE: SEVERAL DAYS
2. NOT BEING ABLE TO STOP OR CONTROL WORRYING: SEVERAL DAYS
3. WORRYING TOO MUCH ABOUT DIFFERENT THINGS: SEVERAL DAYS

## 2023-11-15 ENCOUNTER — ALLIED HEALTH/NURSE VISIT (OUTPATIENT)
Dept: UROLOGY | Facility: CLINIC | Age: 33
End: 2023-11-15
Payer: COMMERCIAL

## 2023-11-15 VITALS
BODY MASS INDEX: 32.96 KG/M2 | SYSTOLIC BLOOD PRESSURE: 133 MMHG | DIASTOLIC BLOOD PRESSURE: 85 MMHG | WEIGHT: 210 LBS | HEIGHT: 67 IN | HEART RATE: 69 BPM

## 2023-11-15 DIAGNOSIS — N30.00 ACUTE CYSTITIS WITHOUT HEMATURIA: Primary | ICD-10-CM

## 2023-11-15 DIAGNOSIS — F41.9 ANXIETY: ICD-10-CM

## 2023-11-15 DIAGNOSIS — R35.0 URINARY FREQUENCY: ICD-10-CM

## 2023-11-15 DIAGNOSIS — F33.0 MILD EPISODE OF RECURRENT MAJOR DEPRESSIVE DISORDER (H): Primary | ICD-10-CM

## 2023-11-15 DIAGNOSIS — M45.9 ANKYLOSING SPONDYLITIS (H): Primary | ICD-10-CM

## 2023-11-15 LAB — BACTERIA UR CULT: ABNORMAL

## 2023-11-15 PROCEDURE — 99213 OFFICE O/P EST LOW 20 MIN: CPT | Performed by: PHYSICIAN ASSISTANT

## 2023-11-15 RX ORDER — HYDROXYZINE HYDROCHLORIDE 25 MG/1
25 TABLET, FILM COATED ORAL EVERY 6 HOURS PRN
Qty: 30 TABLET | Refills: 1 | Status: SHIPPED | OUTPATIENT
Start: 2023-11-15

## 2023-11-15 RX ORDER — CITALOPRAM HYDROBROMIDE 10 MG/1
10 TABLET ORAL DAILY
Qty: 90 TABLET | Refills: 1 | Status: SHIPPED | OUTPATIENT
Start: 2023-11-15

## 2023-11-15 ASSESSMENT — PAIN SCALES - GENERAL: PAINLEVEL: NO PAIN (0)

## 2023-11-15 ASSESSMENT — ANXIETY QUESTIONNAIRES: GAD7 TOTAL SCORE: 6

## 2023-11-15 NOTE — PROGRESS NOTES
PREPROCEDURE DIAGNOSES:    1. Urinary frequency, urgency    POSTPROCEDURE DIAGNOSES:  Same as above.  UTI    INDICATIONS FOR PROCEDURE:  Ms. Wilma Palacios is a pleasant 33 year old female with urinary frequency and urgency. Baseline urodynamic assessment is requested today by Renetta Jackson PA-C to better characterize Ms. Wilma Palacios's voiding dysfunction.      Upon presenting to clinic, it was discovered that Wilma had a UA/UC done yesterday showing pyuria and preliminary urine culture shows 50-100K E coli. She denies any dysuria, gross hematuria, fevers, chills, or flank pain. But has ongoing urinary frequency, urgency, and bladder pressure and possible bladder/urethral spasms.     We reviewed her recent symptom history and diagnostics.   Frequency/urgency symptoms started 10/1/23. She self-treated with Macrobid which she had leftover from a prior prescription.   UA 10/6/23 (2 days after starting antibiotics) showed large blood, 10-25 RBC, 0-5 WBC. UCx with 10-50K mixed  briana (note that she has chronic microscopic hematuria which has previously been evaluated at [facility]).  She was prescribed an additional 5 days of Macrobid on 10/6/2023 to ensure complete treatment duration.   Repeat UA 10/16/23 showed large blood, 10-25 RBC, 0-5 WBC. No urine culture was done.  Another repeat UA 10/31/23 showed moderate blood, moderate LE, 10 RBC, 59 WBC. She was prescribed Keflex 1000 mg BID x 10 days. UCx from that date showed 10-50K mixed  briana.  Yesterday, another UA showed moderate blood, small LE,  WBC, 2-5 RBC, WBC clumps.   Preliminary UCx with 50-100K E coli as previously mentioned.    We discussed the possibility that she may have had a chronic UTI this whole time, and cultures were negative because of recent antibiotic use. Cannot confirm this, but just one possibility.     Ultimately, we discussed that it is not recommended to complete urodynamics in the setting of UTI. Will instead plan for the  following:  -Await final culture results with antibiotic susceptibilities. Treat pending results.   -Plan to repeat UA/UC a week after finishing antibiotics.  -If urinary frequency / urgency symptoms improve, follow up as needed. If infection resolves but frequency / urgency persists, follow up for urodynamics.    Thank you for allowing me to participate in the care of Ms. Wilma Palacios and please don't hesitate to contact me with any questions or concerns.      Kerry Clemons PA-C  Urology Physician Assistant    30 minutes spent on the date of the encounter doing chart review, review of test results, interpretation of tests, patient visit, and documentation

## 2023-11-16 DIAGNOSIS — N30.00 ACUTE CYSTITIS WITHOUT HEMATURIA: Primary | ICD-10-CM

## 2023-11-16 RX ORDER — NITROFURANTOIN 25; 75 MG/1; MG/1
100 CAPSULE ORAL 2 TIMES DAILY
Qty: 10 CAPSULE | Refills: 0 | Status: SHIPPED | OUTPATIENT
Start: 2023-11-16 | End: 2023-11-16

## 2023-11-16 RX ORDER — NITROFURANTOIN 25; 75 MG/1; MG/1
100 CAPSULE ORAL 2 TIMES DAILY
Qty: 14 CAPSULE | Refills: 0 | Status: SHIPPED | OUTPATIENT
Start: 2023-11-16 | End: 2023-11-23

## 2023-12-22 ENCOUNTER — MYC MEDICAL ADVICE (OUTPATIENT)
Dept: UROLOGY | Facility: CLINIC | Age: 33
End: 2023-12-22
Payer: COMMERCIAL

## 2023-12-22 DIAGNOSIS — N30.00 ACUTE CYSTITIS WITHOUT HEMATURIA: Primary | ICD-10-CM

## 2023-12-22 RX ORDER — NITROFURANTOIN 25; 75 MG/1; MG/1
100 CAPSULE ORAL 2 TIMES DAILY
Qty: 14 CAPSULE | Refills: 0 | Status: SHIPPED | OUTPATIENT
Start: 2023-12-22 | End: 2023-12-29

## 2024-01-08 DIAGNOSIS — N89.8 VAGINAL DISCHARGE: ICD-10-CM

## 2024-01-08 DIAGNOSIS — R39.15 URINARY URGENCY: Primary | ICD-10-CM

## 2024-01-18 ENCOUNTER — MYC MEDICAL ADVICE (OUTPATIENT)
Dept: FAMILY MEDICINE | Facility: CLINIC | Age: 34
End: 2024-01-18

## 2024-01-18 ENCOUNTER — LAB (OUTPATIENT)
Dept: LAB | Facility: CLINIC | Age: 34
End: 2024-01-18
Payer: COMMERCIAL

## 2024-01-18 DIAGNOSIS — B37.31 YEAST INFECTION OF THE VAGINA: Primary | ICD-10-CM

## 2024-01-18 DIAGNOSIS — B37.31 CANDIDAL VULVOVAGINITIS: ICD-10-CM

## 2024-01-18 DIAGNOSIS — N76.0 BACTERIAL VAGINOSIS: Primary | ICD-10-CM

## 2024-01-18 DIAGNOSIS — N89.8 VAGINAL DISCHARGE: ICD-10-CM

## 2024-01-18 DIAGNOSIS — B96.89 BACTERIAL VAGINOSIS: Primary | ICD-10-CM

## 2024-01-18 DIAGNOSIS — R39.15 URINARY URGENCY: ICD-10-CM

## 2024-01-18 LAB
ALBUMIN UR-MCNC: 100 MG/DL
APPEARANCE UR: CLEAR
BACTERIA #/AREA URNS HPF: ABNORMAL /HPF
BILIRUB UR QL STRIP: NEGATIVE
CLUE CELLS: ABNORMAL
COLOR UR AUTO: YELLOW
GLUCOSE UR STRIP-MCNC: NEGATIVE MG/DL
HGB UR QL STRIP: ABNORMAL
KETONES UR STRIP-MCNC: NEGATIVE MG/DL
LEUKOCYTE ESTERASE UR QL STRIP: NEGATIVE
MUCOUS THREADS #/AREA URNS LPF: PRESENT /LPF
NITRATE UR QL: NEGATIVE
PH UR STRIP: 7.5 [PH] (ref 5–7)
RBC #/AREA URNS AUTO: ABNORMAL /HPF
SP GR UR STRIP: 1.02 (ref 1–1.03)
SQUAMOUS #/AREA URNS AUTO: ABNORMAL /LPF
TRICHOMONAS, WET PREP: ABNORMAL
UROBILINOGEN UR STRIP-ACNC: 0.2 E.U./DL
WBC #/AREA URNS AUTO: ABNORMAL /HPF
WBC'S/HIGH POWER FIELD, WET PREP: ABNORMAL
YEAST #/AREA URNS HPF: ABNORMAL /HPF
YEAST, WET PREP: PRESENT

## 2024-01-18 PROCEDURE — 87210 SMEAR WET MOUNT SALINE/INK: CPT

## 2024-01-18 PROCEDURE — 87086 URINE CULTURE/COLONY COUNT: CPT

## 2024-01-18 PROCEDURE — 81001 URINALYSIS AUTO W/SCOPE: CPT

## 2024-01-18 RX ORDER — FLUCONAZOLE 150 MG/1
150 TABLET ORAL
Qty: 3 TABLET | Refills: 0 | Status: SHIPPED | OUTPATIENT
Start: 2024-01-18 | End: 2024-01-25

## 2024-01-19 LAB — BACTERIA UR CULT: NORMAL

## 2024-01-22 RX ORDER — FLUCONAZOLE 150 MG/1
150 TABLET ORAL
Qty: 30 TABLET | Refills: 0 | Status: SHIPPED | OUTPATIENT
Start: 2024-01-22 | End: 2024-04-03

## 2024-01-22 RX ORDER — METRONIDAZOLE 7.5 MG/G
1 GEL VAGINAL
Qty: 70 G | Refills: 3 | Status: SHIPPED | OUTPATIENT
Start: 2024-01-22

## 2024-01-24 DIAGNOSIS — N89.8 VAGINAL DISCHARGE: Primary | ICD-10-CM

## 2024-02-08 ENCOUNTER — MYC MEDICAL ADVICE (OUTPATIENT)
Dept: FAMILY MEDICINE | Facility: CLINIC | Age: 34
End: 2024-02-08

## 2024-02-08 ENCOUNTER — TELEPHONE (OUTPATIENT)
Dept: UROLOGY | Facility: CLINIC | Age: 34
End: 2024-02-08

## 2024-02-08 ENCOUNTER — LAB (OUTPATIENT)
Dept: LAB | Facility: CLINIC | Age: 34
End: 2024-02-08
Payer: COMMERCIAL

## 2024-02-08 DIAGNOSIS — N89.8 VAGINAL DISCHARGE: ICD-10-CM

## 2024-02-08 DIAGNOSIS — M45.9 ANKYLOSING SPONDYLITIS (H): ICD-10-CM

## 2024-02-08 LAB
ALBUMIN UR-MCNC: 100 MG/DL
APPEARANCE UR: CLEAR
BACTERIA #/AREA URNS HPF: ABNORMAL /HPF
BILIRUB UR QL STRIP: NEGATIVE
CLUE CELLS: ABNORMAL
COLOR UR AUTO: YELLOW
GLUCOSE UR STRIP-MCNC: NEGATIVE MG/DL
HGB UR QL STRIP: ABNORMAL
KETONES UR STRIP-MCNC: NEGATIVE MG/DL
LEUKOCYTE ESTERASE UR QL STRIP: NEGATIVE
NITRATE UR QL: NEGATIVE
PH UR STRIP: 6 [PH] (ref 5–7)
RBC #/AREA URNS AUTO: ABNORMAL /HPF
SP GR UR STRIP: 1.02 (ref 1–1.03)
SQUAMOUS #/AREA URNS AUTO: ABNORMAL /LPF
TRICHOMONAS, WET PREP: ABNORMAL
UROBILINOGEN UR STRIP-ACNC: 0.2 E.U./DL
WBC #/AREA URNS AUTO: ABNORMAL /HPF
WBC'S/HIGH POWER FIELD, WET PREP: ABNORMAL
YEAST #/AREA URNS HPF: ABNORMAL /HPF
YEAST, WET PREP: PRESENT

## 2024-02-08 PROCEDURE — 81001 URINALYSIS AUTO W/SCOPE: CPT

## 2024-02-08 PROCEDURE — 87086 URINE CULTURE/COLONY COUNT: CPT

## 2024-02-08 PROCEDURE — 87210 SMEAR WET MOUNT SALINE/INK: CPT

## 2024-02-08 NOTE — TELEPHONE ENCOUNTER
Patient called today regarding her MyChart message that she sent. She would like to get treatment started tonight.   Patient stated the pills she has been taking are not working because the lab showed yeast again. She is wondering if there is something else she can try. She said she CANNOT use any creams.     Hoping to get something sent in to the [facility].    Routed to provider of the day.  Wilma Iglesias RN on 2/8/2024 at 3:22 PM

## 2024-02-09 NOTE — TELEPHONE ENCOUNTER
Here's the problem - I have not seen patient in over a year and the visits we did have in 2021 and 2022 were virtual    While vaginal yeast is uncomfortable, it is not life threatening and I suspect the persistence is related to the medications she is on and the imbalance in pH. Without seeing her and getting up to date on everything I am not going to change her treatment regimen at this point. The diflucan she has should work so if she is extremely bothered by the current symptoms she can take 1 daily for 3 days then go back to twice weekly but ongoing discussion about this or anything really needs to involve an in person clinic visit.     Lea Avalos PA-C

## 2024-02-10 LAB — BACTERIA UR CULT: NORMAL

## 2024-02-12 ENCOUNTER — TELEPHONE (OUTPATIENT)
Dept: FAMILY MEDICINE | Facility: CLINIC | Age: 34
End: 2024-02-12

## 2024-02-12 ENCOUNTER — OFFICE VISIT (OUTPATIENT)
Dept: FAMILY MEDICINE | Facility: CLINIC | Age: 34
End: 2024-02-12
Payer: COMMERCIAL

## 2024-02-12 VITALS
HEART RATE: 72 BPM | TEMPERATURE: 98.6 F | OXYGEN SATURATION: 98 % | SYSTOLIC BLOOD PRESSURE: 126 MMHG | BODY MASS INDEX: 33.43 KG/M2 | WEIGHT: 213 LBS | DIASTOLIC BLOOD PRESSURE: 70 MMHG | HEIGHT: 67 IN

## 2024-02-12 DIAGNOSIS — D84.821 DRUG-INDUCED IMMUNODEFICIENCY (H): ICD-10-CM

## 2024-02-12 DIAGNOSIS — R20.9 ABNORMAL ARM SENSATION: ICD-10-CM

## 2024-02-12 DIAGNOSIS — B37.31 RECURRENT CANDIDIASIS OF VAGINA: ICD-10-CM

## 2024-02-12 DIAGNOSIS — E03.9 HYPOTHYROIDISM, UNSPECIFIED TYPE: Primary | ICD-10-CM

## 2024-02-12 DIAGNOSIS — Z79.899 DRUG-INDUCED IMMUNODEFICIENCY (H): ICD-10-CM

## 2024-02-12 DIAGNOSIS — M06.9 RHEUMATOID ARTHRITIS, INVOLVING UNSPECIFIED SITE, UNSPECIFIED WHETHER RHEUMATOID FACTOR PRESENT (H): ICD-10-CM

## 2024-02-12 DIAGNOSIS — Z83.3 FAMILY HISTORY OF DIABETES MELLITUS: ICD-10-CM

## 2024-02-12 LAB
ALBUMIN SERPL BCG-MCNC: 4.3 G/DL (ref 3.5–5.2)
ALP SERPL-CCNC: 72 U/L (ref 40–150)
ALT SERPL W P-5'-P-CCNC: 13 U/L (ref 0–50)
ANION GAP SERPL CALCULATED.3IONS-SCNC: 10 MMOL/L (ref 7–15)
AST SERPL W P-5'-P-CCNC: 16 U/L (ref 0–45)
BASOPHILS # BLD AUTO: 0 10E3/UL (ref 0–0.2)
BASOPHILS NFR BLD AUTO: 0 %
BILIRUB SERPL-MCNC: <0.2 MG/DL
BUN SERPL-MCNC: 15.4 MG/DL (ref 6–20)
CALCIUM SERPL-MCNC: 9.5 MG/DL (ref 8.6–10)
CHLORIDE SERPL-SCNC: 102 MMOL/L (ref 98–107)
CREAT SERPL-MCNC: 0.91 MG/DL (ref 0.51–0.95)
DEPRECATED HCO3 PLAS-SCNC: 26 MMOL/L (ref 22–29)
EGFRCR SERPLBLD CKD-EPI 2021: 86 ML/MIN/1.73M2
EOSINOPHIL # BLD AUTO: 0.2 10E3/UL (ref 0–0.7)
EOSINOPHIL NFR BLD AUTO: 3 %
ERYTHROCYTE [DISTWIDTH] IN BLOOD BY AUTOMATED COUNT: 12.8 % (ref 10–15)
ERYTHROCYTE [SEDIMENTATION RATE] IN BLOOD BY WESTERGREN METHOD: 16 MM/HR (ref 0–20)
FERRITIN SERPL-MCNC: 71 NG/ML (ref 6–175)
GLUCOSE SERPL-MCNC: 104 MG/DL (ref 70–99)
HCT VFR BLD AUTO: 39.3 % (ref 35–47)
HGB BLD-MCNC: 13.4 G/DL (ref 11.7–15.7)
IMM GRANULOCYTES # BLD: 0 10E3/UL
IMM GRANULOCYTES NFR BLD: 0 %
LYMPHOCYTES # BLD AUTO: 2.4 10E3/UL (ref 0.8–5.3)
LYMPHOCYTES NFR BLD AUTO: 32 %
MCH RBC QN AUTO: 30.4 PG (ref 26.5–33)
MCHC RBC AUTO-ENTMCNC: 34.1 G/DL (ref 31.5–36.5)
MCV RBC AUTO: 89 FL (ref 78–100)
MONOCYTES # BLD AUTO: 0.4 10E3/UL (ref 0–1.3)
MONOCYTES NFR BLD AUTO: 6 %
NEUTROPHILS # BLD AUTO: 4.4 10E3/UL (ref 1.6–8.3)
NEUTROPHILS NFR BLD AUTO: 59 %
PLATELET # BLD AUTO: 291 10E3/UL (ref 150–450)
POTASSIUM SERPL-SCNC: 4 MMOL/L (ref 3.4–5.3)
PROT SERPL-MCNC: 7.6 G/DL (ref 6.4–8.3)
RBC # BLD AUTO: 4.41 10E6/UL (ref 3.8–5.2)
SODIUM SERPL-SCNC: 138 MMOL/L (ref 135–145)
TSH SERPL DL<=0.005 MIU/L-ACNC: 2.85 UIU/ML (ref 0.3–4.2)
WBC # BLD AUTO: 7.5 10E3/UL (ref 4–11)

## 2024-02-12 PROCEDURE — 85025 COMPLETE CBC W/AUTO DIFF WBC: CPT | Performed by: PHYSICIAN ASSISTANT

## 2024-02-12 PROCEDURE — 86039 ANTINUCLEAR ANTIBODIES (ANA): CPT | Performed by: PHYSICIAN ASSISTANT

## 2024-02-12 PROCEDURE — 99214 OFFICE O/P EST MOD 30 MIN: CPT | Performed by: PHYSICIAN ASSISTANT

## 2024-02-12 PROCEDURE — 82607 VITAMIN B-12: CPT | Performed by: PHYSICIAN ASSISTANT

## 2024-02-12 PROCEDURE — 85652 RBC SED RATE AUTOMATED: CPT | Performed by: PHYSICIAN ASSISTANT

## 2024-02-12 PROCEDURE — 82306 VITAMIN D 25 HYDROXY: CPT | Performed by: PHYSICIAN ASSISTANT

## 2024-02-12 PROCEDURE — 36415 COLL VENOUS BLD VENIPUNCTURE: CPT | Performed by: PHYSICIAN ASSISTANT

## 2024-02-12 PROCEDURE — 86200 CCP ANTIBODY: CPT | Performed by: PHYSICIAN ASSISTANT

## 2024-02-12 PROCEDURE — 84443 ASSAY THYROID STIM HORMONE: CPT | Performed by: PHYSICIAN ASSISTANT

## 2024-02-12 PROCEDURE — 80053 COMPREHEN METABOLIC PANEL: CPT | Performed by: PHYSICIAN ASSISTANT

## 2024-02-12 PROCEDURE — 83036 HEMOGLOBIN GLYCOSYLATED A1C: CPT | Performed by: PHYSICIAN ASSISTANT

## 2024-02-12 PROCEDURE — 82728 ASSAY OF FERRITIN: CPT | Performed by: PHYSICIAN ASSISTANT

## 2024-02-12 PROCEDURE — 86038 ANTINUCLEAR ANTIBODIES: CPT | Performed by: PHYSICIAN ASSISTANT

## 2024-02-12 ASSESSMENT — ASTHMA QUESTIONNAIRES
QUESTION_4 LAST FOUR WEEKS HOW OFTEN HAVE YOU USED YOUR RESCUE INHALER OR NEBULIZER MEDICATION (SUCH AS ALBUTEROL): NOT AT ALL
QUESTION_1 LAST FOUR WEEKS HOW MUCH OF THE TIME DID YOUR ASTHMA KEEP YOU FROM GETTING AS MUCH DONE AT WORK, SCHOOL OR AT HOME: NONE OF THE TIME
QUESTION_2 LAST FOUR WEEKS HOW OFTEN HAVE YOU HAD SHORTNESS OF BREATH: NOT AT ALL
ACT_TOTALSCORE: 25
ACT_TOTALSCORE: 25
QUESTION_3 LAST FOUR WEEKS HOW OFTEN DID YOUR ASTHMA SYMPTOMS (WHEEZING, COUGHING, SHORTNESS OF BREATH, CHEST TIGHTNESS OR PAIN) WAKE YOU UP AT NIGHT OR EARLIER THAN USUAL IN THE MORNING: NOT AT ALL
QUESTION_5 LAST FOUR WEEKS HOW WOULD YOU RATE YOUR ASTHMA CONTROL: COMPLETELY CONTROLLED

## 2024-02-12 ASSESSMENT — PATIENT HEALTH QUESTIONNAIRE - PHQ9
10. IF YOU CHECKED OFF ANY PROBLEMS, HOW DIFFICULT HAVE THESE PROBLEMS MADE IT FOR YOU TO DO YOUR WORK, TAKE CARE OF THINGS AT HOME, OR GET ALONG WITH OTHER PEOPLE: NOT DIFFICULT AT ALL
SUM OF ALL RESPONSES TO PHQ QUESTIONS 1-9: 1
SUM OF ALL RESPONSES TO PHQ QUESTIONS 1-9: 1

## 2024-02-12 NOTE — PATIENT INSTRUCTIONS
For the vaginal infections:     Continue the maintenance treatment with     Diflucan  Metrogel (but decrease to once weekly)    ADD boric acid suppositories once weekly

## 2024-02-12 NOTE — TELEPHONE ENCOUNTER
Call placed to patient   Appointment scheduled for today 2/12/2024 at 1410 with Ariana    Patient verbalized understanding  No further questions/concerns    Inder Pardo, Clinic RN  [facility]

## 2024-02-12 NOTE — PROGRESS NOTES
Assessment & Plan     (E03.9) Hypothyroidism, unspecified type  (primary encounter diagnosis)  Comment: due for recheck  Plan: TSH WITH FREE T4 REFLEX, TSH with free T4         reflex            (D84.821,  Z79.899) Drug-induced immunodeficiency  (H24)  Comment:   Plan: stable. Followed by rheumatology    (M06.9) Rheumatoid arthritis, involving unspecified site, unspecified whether rheumatoid factor present (H)  Comment:   Plan: followed by rheumatology    (R20.9) Abnormal arm sensation  Comment: not really numbness or tingling and not painful but started in her hands and is spreading up her arms bilaterally. Labs today as she is overdue to check thyroid and will obtain a few additional labs as well. Discussed that if labs negative and given symptoms have persisted and are expanding, would consider MRI of head next (family history of MS in aunt)  Plan: CBC with platelets and differential, Ferritin,         Vitamin B12, Vitamin D Deficiency,         Comprehensive metabolic panel (BMP + Alb, Alk         Phos, ALT, AST, Total. Bili, TP), Anti Nuclear         Janet IgG by IFA with Reflex, Cyclic         Citrullinated Peptide Antibody IgG, ESR:         Erythrocyte sedimentation rate            (B37.31) Recurrent candidiasis of vagina  Comment: discussed issues with yeast and BV infections - did see GYN on Friday but they didn't have any suggestions. HAd a repeaet wet prep at that visit that was negative (day prior was + for yeast and she did take a diflucan) - given positive to negative with diflucan would believe that it is not an issue of resistance to azole therapy and therefore will continue on the plan we had outlined emphasizing that with the maintenance therapy it is likely going to take a few months before marked improvement is noted. I did encourage her to pursue the pelvic floor therapy. Given urology really has not been able to explain her symptoms (no clear infection) I wonder if some improvement in the pelvic  "floor will be helpful   Plan: Physical Therapy Referral, Boric Acid Vaginal         600 MG SUPP            (Z83.3) Family history of diabetes mellitus  Comment:   Plan: check A1c with labs today        BMI  Estimated body mass index is 33.36 kg/m  as calculated from the following:    Height as of this encounter: 1.702 m (5' 7\").    Weight as of this encounter: 96.6 kg (213 lb).     Ally Dillon is a 33 year old, presenting for the following health issues:  Vaginal Problem        2/12/2024     2:07 PM   Additional Questions   Roomed by TATIANNA Francisco     History of Present Illness       Diabetes:   She presents for follow up of diabetes.    She is not checking blood glucose.        She is concerned about other.   She is having weight gain.            Reason for visit:  Vaginal infectionss    She eats 0-1 servings of fruits and vegetables daily.She consumes 1 sweetened beverage(s) daily.She exercises with enough effort to increase her heart rate 30 to 60 minutes per day.  She exercises with enough effort to increase her heart rate 4 days per week. She is missing 1 dose(s) of medications per week.     -She has been having a weird sensation in her arms since around October of 2023. She is wondering if she should be checked for auto immune diseases.     During period feels totally fine but symptoms will be there prior to and sometimes after    Also continues to have the 'wierd sensation' in both her arms  Started in just her hands but is spreading up her arms - now goes to just below her elbows  Not pain and not really even a numbness but just feels different, like they are \"asleep\"   Has an aunt with MS so she is concerned about this      Review of Systems  Remainder of ROS obtained and found to be negative other than that which was documented above        Objective    /70   Pulse 72   Temp 98.6  F (37  C) (Tympanic)   Ht 1.702 m (5' 7\")   Wt 96.6 kg (213 lb)   SpO2 98%   BMI 33.36 kg/m    Body mass index " is 33.36 kg/m .  Physical Exam   GENERAL: alert and no distress  EYES: Eyes grossly normal to inspection  RESP: lungs clear to auscultation - no rales, rhonchi or wheezes  CV: regular rate and rhythm, normal S1 S2, no S3 or S4, no murmur, click or rub, no peripheral edema   MS: no gross musculoskeletal defects noted, no edema  SKIN: no suspicious lesions or rashes    Diagnostic tests:   pending        Signed Electronically by: Lea Avalos PA-C

## 2024-02-12 NOTE — TELEPHONE ENCOUNTER
Roopa from Physical Therapy at [facility] is calling in regard to patient wanting to be seen again by them, but referral that was placed on 10/25/2023 has been closed and they would need a new referral to be placed to see patient.     Miladys RAYMOND, RN  [facility]  [phone]

## 2024-02-13 ENCOUNTER — TELEPHONE (OUTPATIENT)
Dept: FAMILY MEDICINE | Facility: CLINIC | Age: 34
End: 2024-02-13
Payer: COMMERCIAL

## 2024-02-13 ENCOUNTER — MYC MEDICAL ADVICE (OUTPATIENT)
Dept: FAMILY MEDICINE | Facility: CLINIC | Age: 34
End: 2024-02-13
Payer: COMMERCIAL

## 2024-02-13 DIAGNOSIS — Z83.2 FAMILY HISTORY OF LUPUS ANTICOAGULANT DISORDER: Primary | ICD-10-CM

## 2024-02-13 DIAGNOSIS — E55.9 VITAMIN D DEFICIENCY: ICD-10-CM

## 2024-02-13 DIAGNOSIS — Z83.3 FAMILY HISTORY OF DIABETES MELLITUS: Primary | ICD-10-CM

## 2024-02-13 LAB
ANA PAT SER IF-IMP: ABNORMAL
ANA SER QL IF: POSITIVE
ANA TITR SER IF: ABNORMAL {TITER}
HBA1C MFR BLD: 5.5 % (ref 0–5.6)
VIT B12 SERPL-MCNC: 504 PG/ML (ref 232–1245)
VIT D+METAB SERPL-MCNC: 14 NG/ML (ref 20–50)

## 2024-02-13 NOTE — TELEPHONE ENCOUNTER
Order/Referral Request    Who is requesting: Patient    Orders being requested: A1C discussed at appt 2/12    Reason service is needed/diagnosis: pt request     When are orders needed by: as soon as possible    Has this been discussed with Provider: Yes    Does patient have a preference on a Group/Provider/Facility? [facility]    Does patient have an appointment scheduled?: No    Where to send orders: Place orders within Epic - aleyda patient to confirm    Could we send this information to you in [facility] or would you prefer to receive a phone call?:   Patient would prefer a phone call   Okay to leave a detailed message?: Yes at Cell number on file:    Telephone Information:   Mobile [phone]

## 2024-02-13 NOTE — TELEPHONE ENCOUNTER
She had also sent a Instantis message this morning. She wants to confirm that all Auto immune labs were put in.  Wilma Iglesias RN on 2/13/2024 at 9:25 AM

## 2024-02-13 NOTE — TELEPHONE ENCOUNTER
Sorry - my fault. I added it to the labs from yesterday and should be back today. I will wait until all the results are back before sending a result note out    Lea Avalos PA-C

## 2024-02-13 NOTE — TELEPHONE ENCOUNTER
Noted. Patient was notified of add on A1C and that there are other labs pending.   Wilma Iglesias RN on 2/13/2024 at 11:06 AM

## 2024-02-14 LAB — CCP AB SER IA-ACNC: 150 U/ML

## 2024-02-14 RX ORDER — ERGOCALCIFEROL 1.25 MG/1
50000 CAPSULE, LIQUID FILLED ORAL WEEKLY
Qty: 12 CAPSULE | Refills: 0 | Status: SHIPPED | OUTPATIENT
Start: 2024-02-14 | End: 2024-05-10

## 2024-03-01 ENCOUNTER — PRE VISIT (OUTPATIENT)
Dept: UROLOGY | Facility: CLINIC | Age: 34
End: 2024-03-01
Payer: COMMERCIAL

## 2024-03-04 ENCOUNTER — TELEPHONE (OUTPATIENT)
Dept: FAMILY MEDICINE | Facility: CLINIC | Age: 34
End: 2024-03-04
Payer: COMMERCIAL

## 2024-03-04 DIAGNOSIS — R20.9 ABNORMAL SENSATION OF UPPER EXTREMITY: Primary | ICD-10-CM

## 2024-03-04 NOTE — TELEPHONE ENCOUNTER
Reason for Call:  Appointment Request    Patient requesting this type of appt:  weight loss pills    Requested provider: Lea Avalos    Reason patient unable to be scheduled: Not within requested timeframe    When does patient want to be seen/preferred time:  requests sooner visit rigo 3/15 as scheduled    Comments: above    Could we send this information to you in Sova or would you prefer to receive a phone call?:   Patient would like to be contacted via Sova    Call taken on 3/4/2024 at 1:35 PM by Carmita Sandoval

## 2024-03-04 NOTE — TELEPHONE ENCOUNTER
Call placed to Patient.  Relayed that 3/15/24 is the earliest appointment available.  Patient requesting to know if lupus labs were ordered.  Verbalized that lab was ordered.  Requesting to know if MRI of head was ordered to look for MS.  Verbalized that no order was placed.  Patient would like MRI ordered.  Relayed to Patient that Lea is out of office this week.  Iraj Neville RN

## 2024-03-05 NOTE — TELEPHONE ENCOUNTER
The lupus panel was added and patient just needs a lab only visit for this  MRI order placed    Lea

## 2024-03-05 NOTE — TELEPHONE ENCOUNTER
Call placed to Patient.  Relayed Lea's message from below.  Sent Patient mychart message as she is in her car and not able to take MRI scheduling number.  MRI scheduling number given.  Iraj Neville RN

## 2024-03-06 ENCOUNTER — LAB (OUTPATIENT)
Dept: LAB | Facility: CLINIC | Age: 34
End: 2024-03-06
Payer: COMMERCIAL

## 2024-03-06 DIAGNOSIS — Z83.2 FAMILY HISTORY OF LUPUS ANTICOAGULANT DISORDER: ICD-10-CM

## 2024-03-06 PROCEDURE — 36415 COLL VENOUS BLD VENIPUNCTURE: CPT

## 2024-03-06 PROCEDURE — 85730 THROMBOPLASTIN TIME PARTIAL: CPT

## 2024-03-06 PROCEDURE — 85613 RUSSELL VIPER VENOM DILUTED: CPT

## 2024-03-06 PROCEDURE — 85390 FIBRINOLYSINS SCREEN I&R: CPT | Performed by: PATHOLOGY

## 2024-03-07 LAB
DRVVT SCREEN RATIO: 0.96
INR PPP: 1.12 (ref 0.85–1.15)
LA PPP-IMP: NEGATIVE
LOCATION OF TASK: ABNORMAL
LUPUS INTERPRETATION: ABNORMAL
PTT RATIO: 1.06
THROMBIN TIME: 20.1 SECONDS (ref 13–19)

## 2024-03-11 ENCOUNTER — MYC MEDICAL ADVICE (OUTPATIENT)
Dept: FAMILY MEDICINE | Facility: CLINIC | Age: 34
End: 2024-03-11

## 2024-03-11 DIAGNOSIS — R30.0 DYSURIA: Primary | ICD-10-CM

## 2024-03-15 ENCOUNTER — MYC MEDICAL ADVICE (OUTPATIENT)
Dept: FAMILY MEDICINE | Facility: CLINIC | Age: 34
End: 2024-03-15

## 2024-03-15 ENCOUNTER — VIRTUAL VISIT (OUTPATIENT)
Dept: FAMILY MEDICINE | Facility: CLINIC | Age: 34
End: 2024-03-15
Payer: COMMERCIAL

## 2024-03-15 DIAGNOSIS — E66.01 MORBID OBESITY (H): Primary | ICD-10-CM

## 2024-03-15 DIAGNOSIS — M45.0 ANKYLOSING SPONDYLITIS OF MULTIPLE SITES IN SPINE (H): ICD-10-CM

## 2024-03-15 DIAGNOSIS — M06.9 RHEUMATOID ARTHRITIS, INVOLVING UNSPECIFIED SITE, UNSPECIFIED WHETHER RHEUMATOID FACTOR PRESENT (H): ICD-10-CM

## 2024-03-15 PROCEDURE — 99443 PR PHYSICIAN TELEPHONE EVALUATION 21-30 MIN: CPT | Mod: 93 | Performed by: PHYSICIAN ASSISTANT

## 2024-03-15 NOTE — PROGRESS NOTES
"Wilma is a 34 year old who is being evaluated via a billable video visit.    How would you like to obtain your AVS? MyChart  If the video visit is dropped, the invitation should be resent by: Text to cell phone: [phone]  Will anyone else be joining your video visit? No    Assessment & Plan       ICD-10-CM    1. Morbid obesity (H)  E66.01 tirzepatide-Weight Management (ZEPBOUND) 2.5 MG/0.5ML prefilled pen      2. Ankylosing spondylitis of multiple sites in spine (H)  M45.0 tirzepatide-Weight Management (ZEPBOUND) 2.5 MG/0.5ML prefilled pen      3. Rheumatoid arthritis, involving unspecified site, unspecified whether rheumatoid factor present (H)  M06.9 tirzepatide-Weight Management (ZEPBOUND) 2.5 MG/0.5ML prefilled pen        Morbid obesity - BMI >33 with co morbid conditions present  Discussed treatment of obesity with medications available  Reviewed possible side effects of medications  Discussed that at this point we are treating as a chronic disease meaning the likely expectation that these medications may be needed for life.   Discussed process of getting it approved and finding an available medication  Follow up monthly with weights/check in       BMI  Estimated body mass index is 33.36 kg/m  as calculated from the following:    Height as of 2/12/24: 1.702 m (5' 7\").    Weight as of 2/12/24: 96.6 kg (213 lb).   Weight management plan: discussed as per above          Subjective   Wilma is a 34 year old, presenting for the following health issues:  Weight Loss      3/15/2024     4:14 PM   Additional Questions   Roomed by TATIANNA Francisco     HPI       Patient would like to discuss weight loss.     -She is also wondering if she needs any more labs done for autoimmune diseases.    Just feel like her whole body is 'inflamed' and wondering why everything seems to be happening all at once    Has the MRI scheduled    Going to start working on the pelvic floor therapy    Struggling with weight - despite working on " lifestyle changes she is not losing weight and is worried about weight leading to future risks given she is already pre diabetic           Review of Systems  Remainder of ROS obtained and found to be negative other than that which was documented above        Objective           Vitals:  No vitals were obtained today due to virtual visit.    Physical Exam   GENERAL: alert and no distress  EYES: Eyes grossly normal to inspection.  No discharge or erythema, or obvious scleral/conjunctival abnormalities.  RESP: No audible wheeze, cough, or visible cyanosis.    SKIN: Visible skin clear. No significant rash, abnormal pigmentation or lesions.  NEURO: Cranial nerves grossly intact.  Mentation and speech appropriate for age.  PSYCH: Appropriate affect, tone, and pace of words    Diagnostic Tests: none. Reviewed labs from previous      Video-Visit Details    Type of service:  Video Visit   Originating Location (pt. Location): Home    Distant Location (provider location):  On-site  Platform used for Video Visit: Aishwarya  Signed Electronically by: Lea Avalos PA-C

## 2024-03-23 ENCOUNTER — NURSE TRIAGE (OUTPATIENT)
Dept: NURSING | Facility: CLINIC | Age: 34
End: 2024-03-23
Payer: COMMERCIAL

## 2024-03-23 NOTE — TELEPHONE ENCOUNTER
Nurse Triage SBAR    Is this a 2nd Level Triage? NO    Situation: Diarrhea x 3 days.    Background: Per pt she has diarrhea for 3 days that she believes is a side effect of taking new medication,ZEPBOUND.  . Per pt, she is eating bland foodS, hydrating well and taking OTC Imodium.    Assessment: Pt denied any acute distress at this time    Protocol Recommended Disposition:   See PCP Within 24 Hours Pt declined protocol disposition, verbalizing she was told that diarrhea is side effect of once weekly injection ZEPBOUND  medication. Pt is informed that FNA recommendation is advise, pt can decide if to not follow advise.Pt is given protocol specific care advise and call back indications. Pt verbalized understanding and agreement with plan of care and no further questions at this time.     Recommendation:           Does the patient meet one of the following criteria for ADS visit consideration? 16+ years old, with an [facility] PCP     TIP  Providers, please consider if this condition is appropriate for management at one of our Acute and Diagnostic Services sites.     If patient is a good candidate, please use dotphrase <dot>triageresponse and select Refer to ADS to document.    Reason for Disposition   [1] MODERATE diarrhea (e.g., 4-6 times / day more than normal) AND [2] present > 48 hours (2 days)    Additional Information   Negative: Shock suspected (e.g., cold/pale/clammy skin, too weak to stand, low BP, rapid pulse)   Negative: Difficult to awaken or acting confused (e.g., disoriented, slurred speech)   Negative: Sounds like a life-threatening emergency to the triager   Negative: [1] SEVERE abdominal pain (e.g., excruciating) AND [2] present > 1 hour   Negative: [1] SEVERE abdominal pain AND [2] age > 60 years   Negative: [1] Blood in the stool AND [2] moderate or large amount of blood   Negative: Black or tarry bowel movements  (Exception: Chronic-unchanged black-grey BMs AND is taking iron pills or Pepto-Bismol.)    Negative: [1] Drinking very little AND [2] dehydration suspected (e.g., no urine > 12 hours, very dry mouth, very lightheaded)   Negative: Patient sounds very sick or weak to the triager   Negative: [1] SEVERE diarrhea (e.g., 7 or more times / day more than normal) AND [2] age > 60 years   Negative: [1] Constant abdominal pain AND [2] present > 2 hours   Negative: [1] Fever > 103 F (39.4 C) AND [2] not able to get the fever down using Fever Care Advice   Negative: [1] SEVERE diarrhea (e.g., 7 or more times / day more than normal) AND [2] present > 24 hours (1 day)    Protocols used: Diarrhea-A-AH

## 2024-03-25 ENCOUNTER — MYC MEDICAL ADVICE (OUTPATIENT)
Dept: UROLOGY | Facility: CLINIC | Age: 34
End: 2024-03-25
Payer: COMMERCIAL

## 2024-03-25 DIAGNOSIS — R35.0 URINARY FREQUENCY: ICD-10-CM

## 2024-03-25 RX ORDER — MIRABEGRON 25 MG/1
25 TABLET, FILM COATED, EXTENDED RELEASE ORAL DAILY
Qty: 90 TABLET | Refills: 3 | Status: SHIPPED | OUTPATIENT
Start: 2024-03-25

## 2024-03-26 ENCOUNTER — MYC MEDICAL ADVICE (OUTPATIENT)
Dept: FAMILY MEDICINE | Facility: CLINIC | Age: 34
End: 2024-03-26

## 2024-03-28 ENCOUNTER — HOSPITAL ENCOUNTER (OUTPATIENT)
Dept: MRI IMAGING | Facility: CLINIC | Age: 34
Discharge: HOME | End: 2024-03-28
Attending: PHYSICIAN ASSISTANT | Admitting: PHYSICIAN ASSISTANT
Payer: COMMERCIAL

## 2024-03-28 DIAGNOSIS — R20.9 ABNORMAL SENSATION OF UPPER EXTREMITY: ICD-10-CM

## 2024-03-28 PROCEDURE — 255N000002 HC RX 255 OP 636: Performed by: PHYSICIAN ASSISTANT

## 2024-03-28 PROCEDURE — A9585 GADOBUTROL INJECTION: HCPCS | Performed by: PHYSICIAN ASSISTANT

## 2024-03-28 PROCEDURE — 70553 MRI BRAIN STEM W/O & W/DYE: CPT

## 2024-03-28 RX ORDER — GADOBUTROL 604.72 MG/ML
9 INJECTION INTRAVENOUS ONCE
Status: COMPLETED | OUTPATIENT
Start: 2024-03-28 | End: 2024-03-28

## 2024-03-28 RX ADMIN — GADOBUTROL 9 ML: 604.72 INJECTION INTRAVENOUS at 18:51

## 2024-03-28 NOTE — TELEPHONE ENCOUNTER
The patient returned call.  She was told an ol-call provider would call her yesterday regarding her symptoms.  The patient reports her symptoms are severe and need someone to advise on how to treat.  Writer advised her to be seen in the ER with the severe symptoms and she said they would not be able to do anything.  Please advise.    Thank you    Rae CACERES RN

## 2024-03-28 NOTE — TELEPHONE ENCOUNTER
"I would recommend she stop the medication if the side effects are severe.  There is now way to \"reverse\" the side effects, they should improve as she gets further away from her injection.    I would recommend pushing fluids, eating bland meals with protein  Stopping eating before she feels full and she can continue imodium prn.    Dr. Brenda Abbott, DO    "

## 2024-04-01 ENCOUNTER — MYC MEDICAL ADVICE (OUTPATIENT)
Dept: FAMILY MEDICINE | Facility: CLINIC | Age: 34
End: 2024-04-01
Payer: COMMERCIAL

## 2024-04-01 DIAGNOSIS — E66.811 CLASS 1 OBESITY WITH SERIOUS COMORBIDITY AND BODY MASS INDEX (BMI) OF 33.0 TO 33.9 IN ADULT, UNSPECIFIED OBESITY TYPE: Primary | ICD-10-CM

## 2024-04-03 RX ORDER — PHENTERMINE HYDROCHLORIDE 15 MG/1
15 CAPSULE ORAL EVERY MORNING
Qty: 30 CAPSULE | Refills: 0 | Status: SHIPPED | OUTPATIENT
Start: 2024-04-03 | End: 2024-05-02

## 2024-04-15 ENCOUNTER — VIRTUAL VISIT (OUTPATIENT)
Dept: FAMILY MEDICINE | Facility: CLINIC | Age: 34
End: 2024-04-15
Payer: COMMERCIAL

## 2024-04-15 DIAGNOSIS — R20.2 NUMBNESS AND TINGLING OF BOTH UPPER EXTREMITIES: ICD-10-CM

## 2024-04-15 DIAGNOSIS — M79.89 RIGHT LEG SWELLING: Primary | ICD-10-CM

## 2024-04-15 DIAGNOSIS — R20.0 NUMBNESS AND TINGLING OF BOTH UPPER EXTREMITIES: ICD-10-CM

## 2024-04-15 PROCEDURE — 99214 OFFICE O/P EST MOD 30 MIN: CPT | Mod: 95 | Performed by: PHYSICIAN ASSISTANT

## 2024-04-15 NOTE — PROGRESS NOTES
Wilma is a 34 year old who is being evaluated via a billable video visit.    How would you like to obtain your AVS? MyChart  If the video visit is dropped, the invitation should be resent by: Text to cell phone: [phone]  Will anyone else be joining your video visit? No      Assessment & Plan     (M79.89) Right leg swelling  (primary encounter diagnosis)  Comment: on video - area of leg is actually over anterior medial side of lower leg, not calf. No visible redness on video and patient declines feeling any tenderness or hard areas  Plan: reassured by history and limited exam (via virtual nature of visit). Possible that it is just normal asymmetry that can be noted in legs but at this time do not have any concerning features to warrant further work up or investigation    (R20.0,  R20.2) Numbness and tingling of both upper extremities  Comment: MRI of brain normal. ? Related to rheum history although that has been stable. Could consider nerve impingement but slightly unusual given it is in bilateral extremities (vs unilateral). Favor asking neurology for their input before pursuing additional testing  Plan: Adult Neurology  Referral              Subjective   Wilma is a 34 year old, presenting for the following health issues:  Leg Swelling        4/15/2024    11:51 AM   Additional Questions   Roomed by TATIANNA Francisco     HPI     Patient has had some swelling in her upper right calf. No pain, redness or warmth. It has been ongoing for 1.5-2 months. She denies any other symptoms.     May have been there longer - that is just how long she has noticed it  Not sure it is swelling or if its a 'fat roll' - just looks different than the other side  Nontender to touch - doesn't feel any masses or lumps  Doesn't have any redness or swelling     No injury  No known trauma  Not bothered by exercise    HAving no side effects from new weight loss medications like she did with the zepbound so seems to be going  well    Still having the bilateral upper extremity numbness/altered sensation in both hands  Seems to be spreading more up her arms  Not really painful but does get worse later in the day and can make sleeping difficult  MRI of the brain was normal - not sure where to go next        Review of Systems  Remainder of ROS obtained and found to be negative other than that which was documented above        Objective           Vitals:  No vitals were obtained today due to virtual visit.    Physical Exam   GENERAL: alert and no distress  EYES: Eyes grossly normal to inspection.  No discharge or erythema, or obvious scleral/conjunctival abnormalities.  RESP: No audible wheeze, cough, or visible cyanosis.    SKIN: Visible skin clear. No significant rash, abnormal pigmentation or lesions.  NEURO: Cranial nerves grossly intact.  Mentation and speech appropriate for age.  PSYCH: Appropriate affect, tone, and pace of words    Diagnostic Tests: none      Video-Visit Details    Type of service:  Video Visit   Originating Location (pt. Location): Home    Distant Location (provider location):  On-site  Platform used for Video Visit: Aishwarya  Signed Electronically by: Lea Avalos PA-C

## 2024-04-26 ENCOUNTER — TELEPHONE (OUTPATIENT)
Dept: FAMILY MEDICINE | Facility: CLINIC | Age: 34
End: 2024-04-26
Payer: COMMERCIAL

## 2024-04-26 ENCOUNTER — NURSE TRIAGE (OUTPATIENT)
Dept: NURSING | Facility: CLINIC | Age: 34
End: 2024-04-26
Payer: COMMERCIAL

## 2024-04-26 NOTE — TELEPHONE ENCOUNTER
The patient calling back with her blood pressure reading of 121/94.  She reports her complaint is her neck and shoulder pain on the left side.  She reports she was working at her desk and went to turn her head, and suddenly her neck became stiff and painful  She reports their is some pain in her left shoulder area as well.  She reports the numbness and tingling she reported earlier is something she is seeing a neurologist for and has not increased with the current symptoms.  She reports it feels like a muscle spasms  She denies any sweating, jaw pain, or pain radiating down the arms or back area  She rates the pain 6-7/10.  Triage guidelines recommend to see in office within 3 days  Caller verbalized and understands directives    Reason for Disposition   MODERATE neck pain (e.g., interferes with normal activities like work or school)    Additional Information   Negative: Shock suspected (e.g., cold/pale/clammy skin, too weak to stand, low BP, rapid pulse)   Negative: Similar pain previously and it was from 'heart attack'   Negative: Similar pain previously from 'angina' and not relieved by nitroglycerin   Negative: Difficult to awaken or acting confused (e.g., disoriented, slurred speech)   Negative: Sounds like a life-threatening emergency to the triager   Negative: Followed an injury to neck (e.g., MVA, sports, impact or collision)   Negative: Chest pain   Negative: Lymph node in the neck is swollen or painful to the touch   Negative: Sore throat is main symptom   Negative: Difficulty breathing or unusual sweating (e.g., sweating without exertion)   Negative: Chest pain lasting longer than 5 minutes   Negative: Stiff neck (can't touch chin to chest) and has headache   Negative: Stiff neck (can't touch chin to chest) and fever   Negative: Weakness of an arm or hand   Negative: Problems with bowel or bladder control   Negative: Patient sounds very sick or weak to the triager   Negative: SEVERE pain (e.g.,  excruciating, unable to do any normal activities)   Negative: Head is twisting to one side (or ask 'is it turning against your will?')   Negative: Fever > 103 F (39.4 C)   Negative: Fever > 100.0 F (37.8 C) and Intravenous Drug Use (IVDU)   Negative: Fever > 100.0 F (37.8 C) and has diabetes mellitus or a weak immune system (e.g., HIV positive, cancer chemotherapy, organ transplant, splenectomy, chronic steroids)   Negative: Numbness in an arm or hand (i.e., loss of sensation)   Negative: Painful rash with multiple small blisters grouped together (i.e., dermatomal distribution or 'band' or 'stripe')   Negative: High-risk adult (e.g., history of cancer, HIV, or IV Drug Use)   Negative: Patient wants to be seen   Negative: Tenderness in front of neck over windpipe    Protocols used: Neck Pain or [id number]

## 2024-04-26 NOTE — TELEPHONE ENCOUNTER
"Patient called c/o sudden onset of sharp neck/upper shoulder blade area when she is turning her head to the left. Rates her pain as a 7/10 per numeric pain scale, just took a dose of APAP for the pain. Patient denies any recent injury/trauma.     Denies any ST, difficulty swallowing, confusion, HA and/or confusion. Patient is wondering if this could be \"heart related\"due to her chronic RA meds and recently starting phentermine. Denies any chest pain/pressure/palpatations, no SOB/difficulty breathing, no diaphoresis and or syncopal episodes    Patient reports BUE numbness/tingling which is not new for her, see VV note from 4/15/24 with Lea DANIELSON.    Patient states she has not checked her blood pressure recently, has a home cuff she will check this and call care team back with BP/P reading.    Message routed to [facility] Team for follow up.    Julie Behrendt RN    "

## 2024-05-02 ENCOUNTER — MYC MEDICAL ADVICE (OUTPATIENT)
Dept: FAMILY MEDICINE | Facility: CLINIC | Age: 34
End: 2024-05-02
Payer: COMMERCIAL

## 2024-05-02 DIAGNOSIS — E66.811 CLASS 1 OBESITY WITH SERIOUS COMORBIDITY AND BODY MASS INDEX (BMI) OF 33.0 TO 33.9 IN ADULT, UNSPECIFIED OBESITY TYPE: ICD-10-CM

## 2024-05-02 RX ORDER — PHENTERMINE HYDROCHLORIDE 15 MG/1
15 CAPSULE ORAL EVERY MORNING
Qty: 30 CAPSULE | Refills: 3 | Status: SHIPPED | OUTPATIENT
Start: 2024-05-02 | End: 2024-08-06

## 2024-05-04 ENCOUNTER — MYC MEDICAL ADVICE (OUTPATIENT)
Dept: FAMILY MEDICINE | Facility: CLINIC | Age: 34
End: 2024-05-04
Payer: COMMERCIAL

## 2024-05-08 DIAGNOSIS — E55.9 VITAMIN D DEFICIENCY: ICD-10-CM

## 2024-05-10 RX ORDER — ERGOCALCIFEROL 1.25 MG/1
CAPSULE, LIQUID FILLED ORAL
Qty: 12 CAPSULE | Refills: 0 | Status: SHIPPED | OUTPATIENT
Start: 2024-05-10

## 2024-05-20 ENCOUNTER — MYC MEDICAL ADVICE (OUTPATIENT)
Dept: FAMILY MEDICINE | Facility: CLINIC | Age: 34
End: 2024-05-20
Payer: COMMERCIAL

## 2024-05-20 DIAGNOSIS — E66.811 CLASS 1 OBESITY WITH SERIOUS COMORBIDITY AND BODY MASS INDEX (BMI) OF 33.0 TO 33.9 IN ADULT, UNSPECIFIED OBESITY TYPE: Primary | ICD-10-CM

## 2024-05-23 RX ORDER — PHENTERMINE HYDROCHLORIDE 30 MG/1
30 CAPSULE ORAL EVERY MORNING
Qty: 30 CAPSULE | Refills: 0 | Status: SHIPPED | OUTPATIENT
Start: 2024-05-23 | End: 2024-07-28

## 2024-06-04 ENCOUNTER — MYC MEDICAL ADVICE (OUTPATIENT)
Dept: FAMILY MEDICINE | Facility: CLINIC | Age: 34
End: 2024-06-04

## 2024-06-04 DIAGNOSIS — N89.8 VAGINAL DISCHARGE: Primary | ICD-10-CM

## 2024-06-10 ENCOUNTER — LAB (OUTPATIENT)
Dept: LAB | Facility: CLINIC | Age: 34
End: 2024-06-10
Payer: COMMERCIAL

## 2024-06-10 DIAGNOSIS — R30.0 DYSURIA: ICD-10-CM

## 2024-06-10 LAB
ALBUMIN UR-MCNC: ABNORMAL MG/DL
APPEARANCE UR: CLEAR
BACTERIA #/AREA URNS HPF: ABNORMAL /HPF
BILIRUB UR QL STRIP: NEGATIVE
CLUE CELLS: NORMAL
COLOR UR AUTO: YELLOW
GLUCOSE UR STRIP-MCNC: NEGATIVE MG/DL
HGB UR QL STRIP: ABNORMAL
KETONES UR STRIP-MCNC: NEGATIVE MG/DL
LEUKOCYTE ESTERASE UR QL STRIP: NEGATIVE
NITRATE UR QL: NEGATIVE
PH UR STRIP: 5.5 [PH] (ref 5–7)
RBC #/AREA URNS AUTO: ABNORMAL /HPF
SP GR UR STRIP: 1.01 (ref 1–1.03)
SQUAMOUS #/AREA URNS AUTO: ABNORMAL /LPF
TRICHOMONAS, WET PREP: NORMAL
UROBILINOGEN UR STRIP-ACNC: 0.2 E.U./DL
WBC #/AREA URNS AUTO: ABNORMAL /HPF
WBC'S/HIGH POWER FIELD, WET PREP: NORMAL
YEAST, WET PREP: NORMAL

## 2024-06-10 PROCEDURE — 81001 URINALYSIS AUTO W/SCOPE: CPT

## 2024-06-10 PROCEDURE — 87210 SMEAR WET MOUNT SALINE/INK: CPT

## 2024-06-20 ENCOUNTER — LAB (OUTPATIENT)
Dept: LAB | Facility: CLINIC | Age: 34
End: 2024-06-20
Payer: COMMERCIAL

## 2024-06-20 DIAGNOSIS — N89.8 VAGINAL DISCHARGE: ICD-10-CM

## 2024-06-20 DIAGNOSIS — M45.9 ANKYLOSING SPONDYLITIS (H): Primary | ICD-10-CM

## 2024-06-20 DIAGNOSIS — M45.9 ANKYLOSING SPONDYLITIS (H): ICD-10-CM

## 2024-06-20 LAB
ALBUMIN UR-MCNC: 100 MG/DL
APPEARANCE UR: CLEAR
AST SERPL W P-5'-P-CCNC: 14 U/L (ref 0–45)
BACTERIA #/AREA URNS HPF: ABNORMAL /HPF
BASOPHILS # BLD AUTO: 0.1 10E3/UL (ref 0–0.2)
BASOPHILS NFR BLD AUTO: 1 %
BILIRUB UR QL STRIP: NEGATIVE
CLUE CELLS: NORMAL
COLOR UR AUTO: YELLOW
CREAT SERPL-MCNC: 0.89 MG/DL (ref 0.51–0.95)
CRP SERPL-MCNC: <3 MG/L
EGFRCR SERPLBLD CKD-EPI 2021: 88 ML/MIN/1.73M2
EOSINOPHIL # BLD AUTO: 0.4 10E3/UL (ref 0–0.7)
EOSINOPHIL NFR BLD AUTO: 5 %
ERYTHROCYTE [DISTWIDTH] IN BLOOD BY AUTOMATED COUNT: 13.3 % (ref 10–15)
GLUCOSE UR STRIP-MCNC: NEGATIVE MG/DL
HCT VFR BLD AUTO: 37.8 % (ref 35–47)
HGB BLD-MCNC: 13.1 G/DL (ref 11.7–15.7)
HGB UR QL STRIP: ABNORMAL
IMM GRANULOCYTES # BLD: 0 10E3/UL
IMM GRANULOCYTES NFR BLD: 0 %
KETONES UR STRIP-MCNC: NEGATIVE MG/DL
LEUKOCYTE ESTERASE UR QL STRIP: NEGATIVE
LYMPHOCYTES # BLD AUTO: 2.3 10E3/UL (ref 0.8–5.3)
LYMPHOCYTES NFR BLD AUTO: 32 %
MCH RBC QN AUTO: 31.6 PG (ref 26.5–33)
MCHC RBC AUTO-ENTMCNC: 34.7 G/DL (ref 31.5–36.5)
MCV RBC AUTO: 91 FL (ref 78–100)
MONOCYTES # BLD AUTO: 0.4 10E3/UL (ref 0–1.3)
MONOCYTES NFR BLD AUTO: 6 %
NEUTROPHILS # BLD AUTO: 4.1 10E3/UL (ref 1.6–8.3)
NEUTROPHILS NFR BLD AUTO: 57 %
NITRATE UR QL: NEGATIVE
PH UR STRIP: 6.5 [PH] (ref 5–7)
PLATELET # BLD AUTO: 279 10E3/UL (ref 150–450)
RBC # BLD AUTO: 4.14 10E6/UL (ref 3.8–5.2)
RBC #/AREA URNS AUTO: ABNORMAL /HPF
SP GR UR STRIP: >=1.03 (ref 1–1.03)
SQUAMOUS #/AREA URNS AUTO: ABNORMAL /LPF
TRICHOMONAS, WET PREP: NORMAL
UROBILINOGEN UR STRIP-ACNC: 0.2 E.U./DL
WBC # BLD AUTO: 7.3 10E3/UL (ref 4–11)
WBC #/AREA URNS AUTO: ABNORMAL /HPF
WBC'S/HIGH POWER FIELD, WET PREP: NORMAL
YEAST, WET PREP: NORMAL

## 2024-06-20 PROCEDURE — 85025 COMPLETE CBC W/AUTO DIFF WBC: CPT

## 2024-06-20 PROCEDURE — 82565 ASSAY OF CREATININE: CPT

## 2024-06-20 PROCEDURE — 86140 C-REACTIVE PROTEIN: CPT

## 2024-06-20 PROCEDURE — 87210 SMEAR WET MOUNT SALINE/INK: CPT

## 2024-06-20 PROCEDURE — 36415 COLL VENOUS BLD VENIPUNCTURE: CPT

## 2024-06-20 PROCEDURE — 84450 TRANSFERASE (AST) (SGOT): CPT

## 2024-06-20 PROCEDURE — 81001 URINALYSIS AUTO W/SCOPE: CPT

## 2024-07-06 ENCOUNTER — HEALTH MAINTENANCE LETTER (OUTPATIENT)
Age: 34
End: 2024-07-06

## 2024-07-22 ENCOUNTER — OFFICE VISIT (OUTPATIENT)
Dept: FAMILY MEDICINE | Facility: CLINIC | Age: 34
End: 2024-07-22
Payer: COMMERCIAL

## 2024-07-22 VITALS
RESPIRATION RATE: 16 BRPM | TEMPERATURE: 97.7 F | HEIGHT: 67 IN | SYSTOLIC BLOOD PRESSURE: 124 MMHG | BODY MASS INDEX: 31.39 KG/M2 | HEART RATE: 85 BPM | OXYGEN SATURATION: 97 % | WEIGHT: 200 LBS | DIASTOLIC BLOOD PRESSURE: 72 MMHG

## 2024-07-22 DIAGNOSIS — R10.32 LLQ ABDOMINAL PAIN: ICD-10-CM

## 2024-07-22 DIAGNOSIS — Z12.4 CERVICAL CANCER SCREENING: ICD-10-CM

## 2024-07-22 DIAGNOSIS — N94.89 VAGINAL CRAMPING: Primary | ICD-10-CM

## 2024-07-22 LAB
ALBUMIN UR-MCNC: ABNORMAL MG/DL
APPEARANCE UR: CLEAR
BACTERIA #/AREA URNS HPF: ABNORMAL /HPF
BILIRUB UR QL STRIP: NEGATIVE
COLOR UR AUTO: YELLOW
GLUCOSE UR STRIP-MCNC: NEGATIVE MG/DL
HCG UR QL: NEGATIVE
HGB UR QL STRIP: ABNORMAL
KETONES UR STRIP-MCNC: NEGATIVE MG/DL
LEUKOCYTE ESTERASE UR QL STRIP: NEGATIVE
NITRATE UR QL: NEGATIVE
PH UR STRIP: 6 [PH] (ref 5–7)
RBC #/AREA URNS AUTO: ABNORMAL /HPF
SP GR UR STRIP: 1.01 (ref 1–1.03)
SQUAMOUS #/AREA URNS AUTO: ABNORMAL /LPF
UROBILINOGEN UR STRIP-ACNC: 0.2 E.U./DL
WBC #/AREA URNS AUTO: ABNORMAL /HPF

## 2024-07-22 PROCEDURE — 81025 URINE PREGNANCY TEST: CPT | Performed by: NURSE PRACTITIONER

## 2024-07-22 PROCEDURE — 87086 URINE CULTURE/COLONY COUNT: CPT | Performed by: NURSE PRACTITIONER

## 2024-07-22 PROCEDURE — 99213 OFFICE O/P EST LOW 20 MIN: CPT | Performed by: NURSE PRACTITIONER

## 2024-07-22 PROCEDURE — 81001 URINALYSIS AUTO W/SCOPE: CPT | Performed by: NURSE PRACTITIONER

## 2024-07-22 PROCEDURE — 87088 URINE BACTERIA CULTURE: CPT | Performed by: NURSE PRACTITIONER

## 2024-07-22 ASSESSMENT — PAIN SCALES - GENERAL: PAINLEVEL: MILD PAIN (2)

## 2024-07-22 NOTE — PROGRESS NOTES
Assessment & Plan     Vaginal cramping  Will obtain labs here today.  Full plan will depend on outcome  - UA Macroscopic with reflex to Microscopic and Culture; Future  - HCG Qual, Urine ([id number]); Future  - UA Macroscopic with reflex to Microscopic and Culture  - US Pelvic Complete with Transvaginal; Future  - HCG Qual, Urine ([id number])    LLQ abdominal pain  Obtain TV US  May complete once bleeding stops unless pain returns, then should complete urgently.   - US Pelvic Complete with Transvaginal; Future      Subjective   Wilma is a 34 year old, presenting for the following health issues:  Vaginal Problem        7/22/2024    11:11 AM   Additional Questions   Roomed by Sara   Accompanied by Self         7/22/2024    11:11 AM   Patient Reported Additional Medications   Patient reports taking the following new medications OTC Tylenol     History of Present Illness       Reason for visit:  Painful cramping  Symptom onset:  Today    She eats 2-3 servings of fruits and vegetables daily.She consumes 1 sweetened beverage(s) daily.She exercises with enough effort to increase her heart rate 20 to 29 minutes per day.  She exercises with enough effort to increase her heart rate 3 or less days per week.   She is taking medications regularly.     **Pt was unable to leave urine sample at lab    Vaginal Symptoms  Onset/Duration: 11:30PM 7/21/24  Description:  Vaginal Discharge: Yesterday before intense cramping started there was Dark Brown Goop per pt she had thought this was her Menstrual Cycle and today there has been quite a bit of bleeding with Pink Mucus. Notes there was a large Blood Clot late last night pt urinated, this was expelled after Intense Pain started.   Itching (Pruritis): No  Burning sensation:  No  Odor: No  Accompanying Signs & Symptoms:  Urinary symptoms: No  Abdominal pain: YES- Severe Vaginal Cramping   Fever: No  History:   Sexually active: YES  New Partner: No  Possibility of Pregnancy:  No - Has had  "Tubes Tided   Recent antibiotic use: No  Previous vaginitis issues: YES  Precipitating or alleviating factors: Tylenol and expelling Large Blood Clot last night   Therapies tried and outcome: Tylenol       Dark brown spotting that started during the day. Thought was getting period. Intense pain that started at 1130 PM. Tylenol, hot pad, sat on toilet. Lightheaded, sweating and nauseated due to the intense pain. Clot and urine passed. Pain gradually because less intense. Today feels okay. Is having vaginal bleeding. Had tubes tied with last . Has not missed a period. Today has dull period cramping type pain. No vaginal itching or burning.         Objective    /72 (BP Location: Left arm, Patient Position: Chair, Cuff Size: Adult Large)   Pulse 85   Temp 97.7  F (36.5  C) (Temporal)   Resp 16   Ht 1.702 m (5' 7\")   Wt 90.7 kg (200 lb)   LMP 2024 (Exact Date)   SpO2 97%   Breastfeeding No   BMI 31.32 kg/m    Body mass index is 31.32 kg/m .  Physical Exam  Constitutional:       Appearance: She is well-developed.   Cardiovascular:      Rate and Rhythm: Normal rate and regular rhythm.   Pulmonary:      Effort: Pulmonary effort is normal.      Breath sounds: Normal breath sounds.   Abdominal:      General: Bowel sounds are normal. There is no distension.      Palpations: Abdomen is soft.      Tenderness: There is no abdominal tenderness. There is no right CVA tenderness, left CVA tenderness, guarding or rebound.   Skin:     General: Skin is warm.   Neurological:      Mental Status: She is alert.   Psychiatric:         Mood and Affect: Mood normal.              Signed Electronically by: SALIMA Wagner CNP    "

## 2024-07-22 NOTE — PATIENT INSTRUCTIONS
You can call imaging scheduling to set up appointment date, time, and location that works best for you to have TV ultrasound [phone]. If the intense pain returns please schedule right away.  Otherwise, it is okay to wait to schedule until after your menstrual cycle finishes.    Will communicate results and recommendations of your labs and the ultrasound via [employer]

## 2024-07-24 ENCOUNTER — MYC MEDICAL ADVICE (OUTPATIENT)
Dept: FAMILY MEDICINE | Facility: CLINIC | Age: 34
End: 2024-07-24
Payer: COMMERCIAL

## 2024-07-24 DIAGNOSIS — N30.01 ACUTE CYSTITIS WITH HEMATURIA: Primary | ICD-10-CM

## 2024-07-24 LAB
BACTERIA UR CULT: ABNORMAL
BACTERIA UR CULT: ABNORMAL

## 2024-07-24 RX ORDER — AMOXICILLIN 500 MG/1
500 CAPSULE ORAL 3 TIMES DAILY
Qty: 21 CAPSULE | Refills: 0 | Status: SHIPPED | OUTPATIENT
Start: 2024-07-24 | End: 2024-07-31

## 2024-07-25 ENCOUNTER — NURSE TRIAGE (OUTPATIENT)
Dept: FAMILY MEDICINE | Facility: CLINIC | Age: 34
End: 2024-07-25
Payer: COMMERCIAL

## 2024-07-25 DIAGNOSIS — N94.9 VAGINAL DISCOMFORT: Primary | ICD-10-CM

## 2024-07-25 NOTE — TELEPHONE ENCOUNTER
Patient had seen Ginette Love on 7/22/24 for vaginal cramping. Patient states during that visit she was asked about a rash. During that time patient did not have one. Patient states over the last few days she has had navel itching and has noticed 4 pin size sores around the area. She also has a red rash on her whole abdomen. Patient denies it being raised or hives. Patient states it was itchy 5/10 but after applying some lotion it does not itch anymore.     Patient states her urine culture came back with strep B. Ginette did send NXEt message discussing this (See below).     Patient planning to start antibiotics and is wondering if this will help the rash. RN discussed some home care options as well. Patient worried strep B could pass to her young children and is wondering if she needs to worry about rash being contagious.    Please advise     SALIMA Vargas CNP (12:33 PM)        The only explanation that I have is that perhaps the urogenital briana was not a final culture results and that the final result is the group B strep.     Typically, do not need to treat group B strep as this can be a common finding in the vagina.  However, since you had severe pain I think it makes sense to treat.  I have sent a prescription to the pharmacy for amoxicillin 500 mg.  You should take one of them 3 times per day for the next week.     Please call or send [employer] message with any additional questions or concerns  SALIMA Rodriguez CNP, RN on 7/25/2024 at 5:00 PM      Reason for Disposition   Mild localized rash    Additional Information   Negative: Athlete's Foot suspected (i.e., itchy rash between the toes)   Negative: Insect bite(s) suspected   Negative: Jock Itch suspected (i.e., itchy rash on inner thighs near genital area)   Negative: Localized lump (or swelling) without redness or rash   Negative: MPOX SUSPECTED (e.g., direct skin contact such as sex,  recent travel to [place] or [place]) and any SYMPTOMS OF MPOX (e.g., rash, fever, muscle aches, or swollen lymph nodes)   Negative: At risk for Mpox (men-who-have-sex-with-men) and possible exposure (e.g., multiple sex partners in past 21 days) and ANY SYMPTOMS OF MPOX (e.g., rash, fever, muscle aches, or swollen lymph nodes)   Negative: Poison ivy, oak, or sumac rash suspected (e.g., itchy rash after contact with poison ivy)   Negative: Rash of female genital area (e.g., labia, vagina, vulva)   Negative: Rash of male genital area (e.g., penis, scrotum)   Negative: Redness of immunization site   Negative: Shingles suspected (i.e., painful rash, multiple small blisters grouped together in one area of body; dermatomal distribution)   Negative: Small spot, skin growth, or mole   Negative: Wound infection suspected (i.e., pain, spreading redness, or pus; in a cut, puncture, scrape or sutured wound)   Negative: Fever and localized purple or blood-colored spots or dots that are not from injury or friction   Negative: Fever and localized rash is very painful   Negative: Patient sounds very sick or weak to the triager   Negative: Looks like a boil, infected sore, deep ulcer, or other infected rash (spreading redness, pus)   Negative: Painful rash with multiple small blisters grouped together (i.e., dermatomal distribution or 'band' or 'stripe')   Negative: Localized rash is very painful (no fever)   Negative: Localized purple or blood-colored spots or dots that are not from injury or friction (no fever)   Negative: Tender bumps in armpits   Negative: Pimples (localized) and no improvement after using CARE ADVICE   Negative: SEVERE local itching persists after 2 days of steroid cream   Negative: Applying cream or ointment and it causes severe itch, burning, or pain   Negative: Medication patch causing local rash or itching   Negative: Localized rash present > 7 days   Negative: Red, moist, irritated area between skin  "folds (or under larger breasts)   Negative: Localized area of skin darkening or thickening on lower legs or ankles and has NOT been evaluated by a doctor (or NP/PA)    Answer Assessment - Initial Assessment Questions  1. APPEARANCE of RASH: \"Describe the rash.\"       Rash on whole abdomen   2. LOCATION: \"Where is the rash located?\"       Abdomen   3. NUMBER: \"How many spots are there?\"       Has 4 sores total by navel states they are smaller than a pencil tip   4. SIZE: \"How big are the spots?\" (Inches, centimeters or compare to size of a coin)       Smaller than pencil tip   5. ONSET: \"When did the rash start?\"       Patient noticed navel has been itching for a couple days abdomen rash started this afternoon   6. ITCHING: \"Does the rash itch?\" If Yes, ask: \"How bad is the itch?\"  (Scale 0-10; or none, mild, moderate, severe)      Yes, not itching currently used lotion on area and helped, earlier when was itching 5/10  7. PAIN: \"Does the rash hurt?\" If Yes, ask: \"How bad is the pain?\"  (Scale 0-10; or none, mild, moderate, severe)     - NONE (0): no pain     - MILD (1-3): doesn't interfere with normal activities      - MODERATE (4-7): interferes with normal activities or awakens from sleep      - SEVERE (8-10): excruciating pain, unable to do any normal activities      0/10  8. OTHER SYMPTOMS: \"Do you have any other symptoms?\" (e.g., fever)      Very severe cramping before period and had seen provider for this and UA came back with strep B. Patient states provider asked about rash at visit and at the time of visit patient did not have.   9. PREGNANCY: \"Is there any chance you are pregnant?\" \"When was your last menstrual period?\"      NO    Protocols used: Rash or Redness - [id number]    "

## 2024-07-26 NOTE — TELEPHONE ENCOUNTER
RN spoke with patient to check in on how she is doing today. Patient states the red rash on abdomen has resolved but continues to have the 4 pin sized sores around navel. Patient also reports she has started getting random hives on her body since last night. She reports it is not clusters of hives, just 1 individual hive that will appear randomly. She states she spot treats them with hydrocortisone cream and they resolve.     Patient states she just started antibiotic this AM.       Veena Lowe RN on 7/26/2024 at 4:11 PM

## 2024-07-28 ENCOUNTER — MYC REFILL (OUTPATIENT)
Dept: FAMILY MEDICINE | Facility: CLINIC | Age: 34
End: 2024-07-28
Payer: COMMERCIAL

## 2024-07-28 DIAGNOSIS — E55.9 VITAMIN D DEFICIENCY: ICD-10-CM

## 2024-07-28 DIAGNOSIS — E66.811 CLASS 1 OBESITY WITH SERIOUS COMORBIDITY AND BODY MASS INDEX (BMI) OF 33.0 TO 33.9 IN ADULT, UNSPECIFIED OBESITY TYPE: ICD-10-CM

## 2024-07-28 RX ORDER — ERGOCALCIFEROL 1.25 MG/1
CAPSULE, LIQUID FILLED ORAL
Qty: 12 CAPSULE | Refills: 0 | Status: CANCELLED | OUTPATIENT
Start: 2024-07-28

## 2024-07-29 ENCOUNTER — MYC MEDICAL ADVICE (OUTPATIENT)
Dept: FAMILY MEDICINE | Facility: CLINIC | Age: 34
End: 2024-07-29
Payer: COMMERCIAL

## 2024-07-29 DIAGNOSIS — E66.811 CLASS 1 OBESITY WITH SERIOUS COMORBIDITY AND BODY MASS INDEX (BMI) OF 33.0 TO 33.9 IN ADULT, UNSPECIFIED OBESITY TYPE: ICD-10-CM

## 2024-07-29 DIAGNOSIS — E03.9 HYPOTHYROIDISM, UNSPECIFIED TYPE: Primary | ICD-10-CM

## 2024-07-29 RX ORDER — PHENTERMINE HYDROCHLORIDE 30 MG/1
30 CAPSULE ORAL EVERY MORNING
Qty: 30 CAPSULE | Refills: 5 | Status: SHIPPED | OUTPATIENT
Start: 2024-07-29 | End: 2024-08-06

## 2024-07-30 NOTE — TELEPHONE ENCOUNTER
Appears team was not able to reach pt via phone 7/30/24 for follow up on symptoms. RN called to triage, see 7/25/24 Nurse triage.     Sherri Bender, RN on 7/30/2024 at 12:44 PM

## 2024-07-30 NOTE — TELEPHONE ENCOUNTER
RN left message to return call to clinic [phone].  (RN did not leave specific details on voicemail for confidential reasons)    Padmini Duarte RN on 7/30/2024 at 7:42 AM

## 2024-07-30 NOTE — TELEPHONE ENCOUNTER
Nurse Triage SBAR    Is this a 2nd Level Triage? YES, LICENSED PRACTITIONER REVIEW IS REQUIRED    Situation: Pt returning call, rash/hives have now resolved (pt is unsure what caused rash/hives as antibiotic was not taken until day after rash started. Hives resolved on second call out by clinic). Pt states that she has a long history of needing yeast infection prevention while on antibiotics.    Background: currently on amoxicillin (AMOXIL) 500 MG capsule for pain.     Assessment: Now has diarrhea after starting Antibiotic 7/26/24. Pt reports Vaginal discomfort and concerned for possible yeast infection as symptoms are similar to previous infections. Pt notes urgency symptoms worse than normal. Pt is asking if she needs to be on antibiotic. Rn informed pt that this was given for symptoms of suspected UTI. Pt is asking for wet prep to rule out yeast infection along with UA/UC to recheck group B.     Protocol Recommended Disposition:   See in Office Today or Tomorrow, Home Care: RN informed pt a follow up may be appropriate for wet prep and repeat UA/UC to rule out yeast infection and see if group B is resolved. Pt declined and would like provider advice prior to scheduling appointment if she can do labs instead.     Recommendation: OK for pt to discontinue antibiotic? OK for labs (wet prep and UA/UC) per pt's request?     Routed to provider    Does the patient meet one of the following criteria for ADS visit consideration? 16+ years old, with an [facility] PCP     TIP  Providers, please consider if this condition is appropriate for management at one of our Acute and Diagnostic Services sites.     If patient is a good candidate, please use dotphrase <dot>triageresponse and select Refer to ADS to document.    Sherri Bender RN on 7/30/2024 at 12:42 PM     Reason for Disposition   Taking antibiotic > 72 hours (3 days) for UTI and painful urination or frequency is SAME (unchanged, not better)    Additional Information   Negative:  "Shock suspected (e.g., cold/pale/clammy skin, too weak to stand, low BP, rapid pulse)   Negative: Sounds like a life-threatening emergency to the triager   Negative: Urinary tract infection suspected, but not taking antibiotics   Negative: Unable to urinate (or only a few drops) > 4 hours and bladder feels very full (e.g., palpable bladder or strong urge to urinate)   Negative: Passing pure blood or large blood clots (i.e., size > a dime)  (Exceptions: Flecks, small strands, or pinkish-red color.)   Negative: Fever > 103 F (39.4 C)   Negative: Patient sounds very sick or weak to the triager   Negative: SEVERE pain (e.g., excruciating) and no improvement 2 hours after pain medications   Negative: Fever > 100.0 F (37.8 C) and new-onset since starting antibiotics   Negative: Side (flank) or lower back pain and new-onset since starting antibiotics   Negative: Taking antibiotic > 24 hours for UTI and flank or lower back pain getting worse   Negative: Vomiting 2 or more times and interferes with taking oral antibiotic   Negative: Taking antibiotic > 24 hours for UTI (urinary tract or bladder infection) and fever persists (still has fever)   Negative: Taking antibiotic > 72 hours (3 days) for UTI and flank or lower back pain is SAME (unchanged, not better)    Answer Assessment - Initial Assessment Questions  1. MAIN SYMPTOM: \"What is the main symptom you are concerned about?\" (e.g., painful urination, urine frequency)      Vaginal discomfort now, urinary urgency, diarrhea  2. BETTER-SAME-WORSE: \"Are you getting better, staying the same, or getting worse compared to how you felt at your last visit to the doctor (most recent medical visit)?\"      worse  3. PAIN: \"How bad is the pain?\"  (e.g., Scale 1-10; mild, moderate, or severe)    - MILD (1-3): complains slightly about urination hurting    - MODERATE (4-7): interferes with normal activities      - SEVERE (8-10): excruciating, unwilling or unable to urinate because of the " "pain       No pain  4. FEVER: \"Do you have a fever?\" If Yes, ask: \"What is it, how was it measured, and when did it start?\"      no  5. OTHER SYMPTOMS: \"Do you have any other symptoms?\" (e.g., blood in the urine, flank pain, vaginal discharge)      Vaginal discomfort   6. DIAGNOSIS: \"When was the UTI diagnosed?\" \"By whom?\" \"Was it a kidney infection, bladder infection or both?\"      No dx  7. ANTIBIOTIC: \"What antibiotic(s) are you taking?\" \"How many times per day?\"      Amoxicillin   8. ANTIBIOTIC - START DATE: \"When did you start taking the antibiotic?\"      7/26/24    Protocols used: Urinary Tract Infection on Antibiotic Follow-up Call - Female-A-OH    "

## 2024-07-30 NOTE — TELEPHONE ENCOUNTER
Attempted to call patient with number on file to relay provider's message below with no answer, left voicemail to call clinic back at [phone].    Sherri Bender RN on 7/30/2024 at 3:20 PM

## 2024-07-31 NOTE — TELEPHONE ENCOUNTER
RN left message to return call to clinic [phone].  (RN did not leave specific details on voicemail for confidential reasons)    Padmini Duarte RN on 7/31/2024 at 12:02 PM

## 2024-08-01 ENCOUNTER — VIRTUAL VISIT (OUTPATIENT)
Dept: FAMILY MEDICINE | Facility: OTHER | Age: 34
End: 2024-08-01
Payer: COMMERCIAL

## 2024-08-01 ENCOUNTER — HOSPITAL ENCOUNTER (OUTPATIENT)
Dept: ULTRASOUND IMAGING | Facility: CLINIC | Age: 34
Discharge: HOME | End: 2024-08-01
Attending: NURSE PRACTITIONER | Admitting: NURSE PRACTITIONER
Payer: COMMERCIAL

## 2024-08-01 DIAGNOSIS — N89.8 VAGINAL DISCHARGE: Primary | ICD-10-CM

## 2024-08-01 DIAGNOSIS — R10.32 LLQ ABDOMINAL PAIN: ICD-10-CM

## 2024-08-01 DIAGNOSIS — N94.89 VAGINAL CRAMPING: ICD-10-CM

## 2024-08-01 PROCEDURE — 99441 PR PHYSICIAN TELEPHONE EVALUATION 5-10 MIN: CPT | Mod: 93 | Performed by: PHYSICIAN ASSISTANT

## 2024-08-01 PROCEDURE — 76830 TRANSVAGINAL US NON-OB: CPT

## 2024-08-01 ASSESSMENT — ASTHMA QUESTIONNAIRES
QUESTION_5 LAST FOUR WEEKS HOW WOULD YOU RATE YOUR ASTHMA CONTROL: COMPLETELY CONTROLLED
QUESTION_2 LAST FOUR WEEKS HOW OFTEN HAVE YOU HAD SHORTNESS OF BREATH: NOT AT ALL
QUESTION_3 LAST FOUR WEEKS HOW OFTEN DID YOUR ASTHMA SYMPTOMS (WHEEZING, COUGHING, SHORTNESS OF BREATH, CHEST TIGHTNESS OR PAIN) WAKE YOU UP AT NIGHT OR EARLIER THAN USUAL IN THE MORNING: NOT AT ALL
ACT_TOTALSCORE: 25
QUESTION_4 LAST FOUR WEEKS HOW OFTEN HAVE YOU USED YOUR RESCUE INHALER OR NEBULIZER MEDICATION (SUCH AS ALBUTEROL): NOT AT ALL
QUESTION_1 LAST FOUR WEEKS HOW MUCH OF THE TIME DID YOUR ASTHMA KEEP YOU FROM GETTING AS MUCH DONE AT WORK, SCHOOL OR AT HOME: NONE OF THE TIME
ACT_TOTALSCORE: 25

## 2024-08-01 ASSESSMENT — PATIENT HEALTH QUESTIONNAIRE - PHQ9
10. IF YOU CHECKED OFF ANY PROBLEMS, HOW DIFFICULT HAVE THESE PROBLEMS MADE IT FOR YOU TO DO YOUR WORK, TAKE CARE OF THINGS AT HOME, OR GET ALONG WITH OTHER PEOPLE: NOT DIFFICULT AT ALL
SUM OF ALL RESPONSES TO PHQ QUESTIONS 1-9: 3
SUM OF ALL RESPONSES TO PHQ QUESTIONS 1-9: 3

## 2024-08-01 NOTE — TELEPHONE ENCOUNTER
Patient states that she will send a message to her provider regarding her concern.     Chica NOLASCO  [facility]

## 2024-08-01 NOTE — PROGRESS NOTES
Wilma is a 34 year old who is being evaluated via a billable telephone visit.    What phone number would you like to be contacted at? See list   How would you like to obtain your AVS? MyChart  Originating Location (pt. Location): Home    Distant Location (provider location):  Off-site    Assessment & Plan     ICD-10-CM    1. Vaginal discharge  N89.8 UA Macroscopic with reflex to Microscopic and Culture - Lab Collect     Wet prep - lab collect        - Patient with history of UTI and vaginal issues      Recently with UTI on 7/22/24 - culture grew Strep B      Being worked up for her symptoms, has US pelvic today   - Took 2-3 days of Amoxicillin but stopped it due to side effects   - Today noticing pink tinged vaginal discharge  - Recommend getting UA and wet prep   - Patient in agreement for plan  - Await results      Review of the result(s) of each unique test - See list         7/22/24 - UA/UC       6/20/24 - UA/UC, wet prep   Diagnosis or treatment significantly limited by social determinants of health - none    15 minutes spent on the date of the encounter doing chart review, history and exam, documentation and further activities as noted above    The patient indicates understanding of these issues and agrees with the plan.    Follow up: RITCHIE Mohan-COSTA Ozuna  [facility]   Wilma is a 34 year old, presenting for the following health issues:  Vaginal Problem    HPI     Vaginal Symptoms    - Cramping pain   - Getting ultrasound today   - Had infection in urine      Took Amoxicillin     2-3 days had stomach aches, diarrhea, migraines   - Today pink spotting in discharge   - Would rather not take amoxicillin anymore         Review of Systems  Constitutional, neuro, ENT, endocrine, pulmonary, cardiac, gastrointestinal, genitourinary, musculoskeletal, integument and psychiatric systems are negative, except as otherwise noted.      Objective        Vitals:  No vitals were obtained today due to virtual visit.    Physical Exam   General: Alert and no distress //Respiratory: No audible wheeze, cough, or shortness of breath // Psychiatric:  Appropriate affect, tone, and pace of words      Diagnostics: reviewed in Epic       Phone call duration: 6 minutes  Signed Electronically by: Ilana De La Torre PA-C

## 2024-08-01 NOTE — TELEPHONE ENCOUNTER
Pt calling frustrated the UA order has not been placed.     RN relayed provider wants to start with wet prep first. Pt frustrated states the nurses are not doing their job properly and states she will send a mychart message to her provider herself    RN did relay that mychart messages do usually start with an RN reading the message     Patient verbalized understanding and wants to put in her own words what she is looking for from her provider     RN offered pt relations number. Pt declined     Bridgette Paez, RN

## 2024-08-01 NOTE — TELEPHONE ENCOUNTER
Recommend wet prep first. Usually do not retest urine unless is still having symptoms.     Ginette Love NPCherryC

## 2024-08-02 RX ORDER — LEVOTHYROXINE SODIUM 125 MCG
125 TABLET ORAL DAILY
Qty: 90 TABLET | Refills: 1 | Status: SHIPPED | OUTPATIENT
Start: 2024-08-02

## 2024-08-06 RX ORDER — PHENTERMINE HYDROCHLORIDE 37.5 MG/1
37.5 CAPSULE ORAL EVERY MORNING
Qty: 30 CAPSULE | Refills: 5 | Status: SHIPPED | OUTPATIENT
Start: 2024-08-06

## 2024-09-10 ENCOUNTER — MYC MEDICAL ADVICE (OUTPATIENT)
Dept: FAMILY MEDICINE | Facility: CLINIC | Age: 34
End: 2024-09-10

## 2024-09-10 ENCOUNTER — LAB (OUTPATIENT)
Dept: LAB | Facility: CLINIC | Age: 34
End: 2024-09-10
Payer: COMMERCIAL

## 2024-09-10 DIAGNOSIS — N89.8 VAGINAL DISCHARGE: ICD-10-CM

## 2024-09-10 LAB
ALBUMIN UR-MCNC: 100 MG/DL
APPEARANCE UR: CLEAR
BACTERIA #/AREA URNS HPF: ABNORMAL /HPF
BILIRUB UR QL STRIP: NEGATIVE
CLUE CELLS: ABNORMAL
COLOR UR AUTO: YELLOW
GLUCOSE UR STRIP-MCNC: >=1000 MG/DL
HGB UR QL STRIP: ABNORMAL
KETONES UR STRIP-MCNC: NEGATIVE MG/DL
LEUKOCYTE ESTERASE UR QL STRIP: NEGATIVE
NITRATE UR QL: NEGATIVE
PH UR STRIP: 5 [PH] (ref 5–7)
RBC #/AREA URNS AUTO: ABNORMAL /HPF
SP GR UR STRIP: 1.02 (ref 1–1.03)
TRICHOMONAS, WET PREP: ABNORMAL
UROBILINOGEN UR STRIP-ACNC: 0.2 E.U./DL
WBC #/AREA URNS AUTO: ABNORMAL /HPF
WBC'S/HIGH POWER FIELD, WET PREP: ABNORMAL
YEAST, WET PREP: ABNORMAL

## 2024-09-10 PROCEDURE — 87210 SMEAR WET MOUNT SALINE/INK: CPT

## 2024-09-10 PROCEDURE — 81001 URINALYSIS AUTO W/SCOPE: CPT

## 2024-09-12 ENCOUNTER — HOSPITAL ENCOUNTER (EMERGENCY)
Facility: CLINIC | Age: 34
Discharge: HOME | End: 2024-09-13
Attending: FAMILY MEDICINE | Admitting: FAMILY MEDICINE
Payer: COMMERCIAL

## 2024-09-12 DIAGNOSIS — R10.32 LEFT LOWER QUADRANT ABDOMINAL PAIN: ICD-10-CM

## 2024-09-12 PROCEDURE — 99284 EMERGENCY DEPT VISIT MOD MDM: CPT | Performed by: FAMILY MEDICINE

## 2024-09-12 PROCEDURE — 99284 EMERGENCY DEPT VISIT MOD MDM: CPT | Mod: 25 | Performed by: FAMILY MEDICINE

## 2024-09-12 PROCEDURE — 96360 HYDRATION IV INFUSION INIT: CPT | Performed by: FAMILY MEDICINE

## 2024-09-12 ASSESSMENT — COLUMBIA-SUICIDE SEVERITY RATING SCALE - C-SSRS
1. IN THE PAST MONTH, HAVE YOU WISHED YOU WERE DEAD OR WISHED YOU COULD GO TO SLEEP AND NOT WAKE UP?: NO
6. HAVE YOU EVER DONE ANYTHING, STARTED TO DO ANYTHING, OR PREPARED TO DO ANYTHING TO END YOUR LIFE?: NO
2. HAVE YOU ACTUALLY HAD ANY THOUGHTS OF KILLING YOURSELF IN THE PAST MONTH?: NO

## 2024-09-13 ENCOUNTER — APPOINTMENT (OUTPATIENT)
Dept: CT IMAGING | Facility: CLINIC | Age: 34
End: 2024-09-13
Attending: FAMILY MEDICINE
Payer: COMMERCIAL

## 2024-09-13 VITALS
TEMPERATURE: 98.9 F | SYSTOLIC BLOOD PRESSURE: 132 MMHG | DIASTOLIC BLOOD PRESSURE: 80 MMHG | HEART RATE: 69 BPM | OXYGEN SATURATION: 100 % | RESPIRATION RATE: 18 BRPM

## 2024-09-13 LAB
ANION GAP SERPL CALCULATED.3IONS-SCNC: 11 MMOL/L (ref 7–15)
BASOPHILS # BLD AUTO: 0.1 10E3/UL (ref 0–0.2)
BASOPHILS NFR BLD AUTO: 1 %
BUN SERPL-MCNC: 18 MG/DL (ref 6–20)
CALCIUM SERPL-MCNC: 9.4 MG/DL (ref 8.8–10.4)
CHLORIDE SERPL-SCNC: 105 MMOL/L (ref 98–107)
CREAT SERPL-MCNC: 1.02 MG/DL (ref 0.51–0.95)
EGFRCR SERPLBLD CKD-EPI 2021: 75 ML/MIN/1.73M2
EOSINOPHIL # BLD AUTO: 0.1 10E3/UL (ref 0–0.7)
EOSINOPHIL NFR BLD AUTO: 1 %
ERYTHROCYTE [DISTWIDTH] IN BLOOD BY AUTOMATED COUNT: 12.7 % (ref 10–15)
GLUCOSE SERPL-MCNC: 109 MG/DL (ref 70–99)
HCG SERPL QL: NEGATIVE
HCO3 SERPL-SCNC: 22 MMOL/L (ref 22–29)
HCT VFR BLD AUTO: 38.1 % (ref 35–47)
HGB BLD-MCNC: 13.3 G/DL (ref 11.7–15.7)
IMM GRANULOCYTES # BLD: 0 10E3/UL
IMM GRANULOCYTES NFR BLD: 0 %
LYMPHOCYTES # BLD AUTO: 2 10E3/UL (ref 0.8–5.3)
LYMPHOCYTES NFR BLD AUTO: 21 %
MCH RBC QN AUTO: 31.9 PG (ref 26.5–33)
MCHC RBC AUTO-ENTMCNC: 34.9 G/DL (ref 31.5–36.5)
MCV RBC AUTO: 91 FL (ref 78–100)
MONOCYTES # BLD AUTO: 0.4 10E3/UL (ref 0–1.3)
MONOCYTES NFR BLD AUTO: 5 %
NEUTROPHILS # BLD AUTO: 6.7 10E3/UL (ref 1.6–8.3)
NEUTROPHILS NFR BLD AUTO: 72 %
NRBC # BLD AUTO: 0 10E3/UL
NRBC BLD AUTO-RTO: 0 /100
PLATELET # BLD AUTO: 281 10E3/UL (ref 150–450)
POTASSIUM SERPL-SCNC: 4.1 MMOL/L (ref 3.4–5.3)
RBC # BLD AUTO: 4.17 10E6/UL (ref 3.8–5.2)
SODIUM SERPL-SCNC: 138 MMOL/L (ref 135–145)
WBC # BLD AUTO: 9.4 10E3/UL (ref 4–11)

## 2024-09-13 PROCEDURE — 258N000003 HC RX IP 258 OP 636: Performed by: FAMILY MEDICINE

## 2024-09-13 PROCEDURE — 85004 AUTOMATED DIFF WBC COUNT: CPT | Performed by: FAMILY MEDICINE

## 2024-09-13 PROCEDURE — 84703 CHORIONIC GONADOTROPIN ASSAY: CPT | Performed by: FAMILY MEDICINE

## 2024-09-13 PROCEDURE — 74176 CT ABD & PELVIS W/O CONTRAST: CPT

## 2024-09-13 PROCEDURE — 80048 BASIC METABOLIC PNL TOTAL CA: CPT | Performed by: FAMILY MEDICINE

## 2024-09-13 PROCEDURE — 36415 COLL VENOUS BLD VENIPUNCTURE: CPT | Performed by: FAMILY MEDICINE

## 2024-09-13 RX ADMIN — SODIUM CHLORIDE 1000 ML: 9 INJECTION, SOLUTION INTRAVENOUS at 00:11

## 2024-09-13 ASSESSMENT — ACTIVITIES OF DAILY LIVING (ADL): ADLS_ACUITY_SCORE: 35

## 2024-09-13 NOTE — DISCHARGE INSTRUCTIONS
Your lab workup and CT were reassuring.  You did not have any evidence of a kidney stone.  I am not exactly sure what is causing your pain.  May be something gynecologic and I recommend following up with your gynecologist and your regular doctor.  Return to the ER with new or worsening symptoms

## 2024-09-13 NOTE — ED PROVIDER NOTES
HPI   Patient is a 34-year-old female presenting by private car with her mom for left lower quadrant abdominal pain.  Per my chart review, the patient has a complicated past medical history involving autoimmune processes.  She has hypothyroidism, taking Synthroid.  She has ankylosing spondylitis.  She has rheumatoid arthritis.  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  She has diabetes type 2.  She has CKD, asthma, hypertension.  She is on dapagliflozin, golimumab, and enbrel.      The patient had sudden onset left lower quadrant abdominal pain starting at about 9:00 PM tonight.  No obvious cause of symptoms.  The pain comes and goes suddenly.  There is constant underlying discomfort but severe worsening pain in between.  The pain is not associated with nausea or vomiting.  No dysuria, urgency, frequency of urination.  No gross hematuria.  No recent vaginal bleeding.  Her last menstruation was about a month ago and she is expecting it to happen again shortly.  No diarrhea or constipation.  No hematochezia or melena.  Urine culture in July grew out less than 10,000 colonies of strep B.  She was treated briefly with amoxicillin but she stopped it secondary to side effects.  Urine analysis 2 days ago shows hematuria which is not a new problem for her.  No culture obtained.  Wet prep was negative.  Pelvic ultrasound unremarkable for ovarian cyst or any other acute pathology.  The patient reports having a CT scan performed recently but there is no documentation of this, the last being in January, 2024.      Allergies:  Allergies   Allergen Reactions    Acetic Acid Anaphylaxis, Itching and Rash    Ascorbate Anaphylaxis     All Fruits.    Food Other (See Comments)     Berries (fruit)  Throat closes, itchy    Lycopene      Other reaction(s): Angioedema    Nuts Swelling and Nausea and Vomiting    Pistachio Nut Extract Anaphylaxis    Sulfa Antibiotics     Other Drug Allergy (See Comments) Hives and Rash      BLEACH     Problem List:    Patient Active Problem List    Diagnosis Date Noted    Drug-induced immunodeficiency  (H24) 2022     Priority: Medium    Subchorionic hemorrhage in first trimester 2022     Priority: Medium    Clinical diagnosis of COVID-19 2021     Priority: Medium    Cervical cancer screening 2020     Priority: Medium      NIL pap  2013 NIL pap.   NIL pap.   NIL pap  20 NIL pap. Plan cotest in 1 year due bef 21 due to immunosuppression.   21 NIL Pap, Neg HPV. Plan cotest in 1 year. If NIL Pap, Neg HPV, then every 3 years.   22 Pt currently pregnant, seeing [facility]. Scheduled c/s on . Will send reminders after delivery.  22 Reminder mychart  22 [facility] office note :Pap smear NILM 2021. Repeat with HPV co-testing in .        Rheumatoid arthritis (H) 2020     Priority: Medium    Chronic chest wall pain 2018     Priority: Medium     Overview:   Secondary to spondyloarthropathy       HLA-B27 spondyloarthropathy 2018     Priority: Medium     Overview:   2nd opinion confirmed at [place], diagnosed at [facility]       Ankylosing spondylitis of multiple sites in spine (H) 10/04/2018     Priority: Medium     Rheumatology note from [place] (1/15/19)  scanned into Epic.  Does not think there are any contraindications for vaginal birth.  Follow up with them 32-34 wks.  Pneumonia vaccine in 2nd or 3rd trimester.  Also follow up 6-8 weeks after delivery, sooner as needed      Anxiety 2017     Priority: Medium    Allergic rhinitis 2014     Priority: Medium    Hypothyroidism 2013     Priority: Medium      Past Medical History:    Past Medical History:   Diagnosis Date    Ankylosing spondylitis with multisystem involvement (H)     Anxiety     Rheumatoid arthritis (H) 2020    Thyroid disease 2008     Past Surgical History:    Past Surgical History:   Procedure Laterality Date     SECTION   2019    COLONOSCOPY  2015    COLONOSCOPY      EXTRACTION(S) DENTAL  2009     Family History:    Family History   Problem Relation Age of Onset    Skin Cancer Maternal Grandfather     Rheumatoid Arthritis Maternal Grandfather     Heart Disease Paternal Grandmother      Social History:  Marital Status:   [2]  Social History     Tobacco Use    Smoking status: Former     Current packs/day: 0.00     Types: Cigarettes     Quit date: 2013     Years since quittin.7    Smokeless tobacco: Never   Vaping Use    Vaping status: Never Used   Substance Use Topics    Alcohol use: Not Currently     Comment: rarely    Drug use: No      Medications:    albuterol (PROVENTIL) (2.5 MG/3ML) 0.083% neb solution  citalopram (CELEXA) 10 MG tablet  ENBREL MINI 50 MG/ML SOCT  fluticasone (FLOVENT HFA) 110 MCG/ACT inhaler  hydrOXYzine (ATARAX) 25 MG tablet  hydrOXYzine (ATARAX) 25 MG tablet  losartan (COZAAR) 100 MG tablet  metroNIDAZOLE (METROGEL) 0.75 % vaginal gel  metroNIDAZOLE (METROGEL) 0.75 % vaginal gel  mirabegron (MYRBETRIQ) 25 MG 24 hr tablet  phentermine (ADIPEX-P) 37.5 MG capsule  SYNTHROID 125 MCG tablet  vitamin D2 (ERGOCALCIFEROL) 45763 units (1250 mcg) capsule      Review of Systems   All other systems reviewed and are negative.      PE      Physical Exam  Vitals reviewed.   Constitutional:       Appearance: She is well-developed.      Comments: Patient relatively comfortable when I enter the room.  She is anxious that her pain will return.  Mom present throughout.  Cooperative, answering questions well.   HENT:      Head: Normocephalic and atraumatic.      Right Ear: External ear normal.      Left Ear: External ear normal.      Nose: Nose normal.      Mouth/Throat:      Mouth: Mucous membranes are moist.      Pharynx: Oropharynx is clear.   Eyes:      Extraocular Movements: Extraocular movements intact.      Conjunctiva/sclera: Conjunctivae normal.      Pupils: Pupils are equal, round, and reactive to light.    Cardiovascular:      Rate and Rhythm: Normal rate and regular rhythm.   Pulmonary:      Effort: Pulmonary effort is normal.   Abdominal:      Comments: She has tenderness as I push into her left lower quadrant.  No guarding.  Soft throughout.  No distention.  No obvious organomegaly or mass.   Musculoskeletal:         General: Normal range of motion.      Cervical back: Normal range of motion.   Skin:     General: Skin is warm and dry.   Neurological:      Mental Status: She is alert and oriented to person, place, and time.   Psychiatric:         Behavior: Behavior normal.         ED COURSE and MDM   1209.  Left lower quadrant abdominal pain.  She had similar symptoms a month ago prior to her last menstruation.  She was most recently seen for vaginal discharge, negative urine, wet prep, pelvic ultrasound.  No evidence for recent CT imaging, last was in January, 2024.  Lab values pending.  Patient is currently not in pain, no medication will be given.  She took ibuprofen and Tylenol prior to arrival.  IV fluid bolus, normal saline.    1232.  Patient without pain currently.  CT stone ordered.  Patient has not urinated and will not do so before the CT scan is completed.  Avoiding contrast given her history of glomerulonephritis.    1242.  CT scan pending.  If there is evidence of ureteral stone or stone passed in the bladder then at least we can provide her with an answer for the cause of her pain.  If the scan is unremarkable, the source of pain is unknown.  We talked about ovarian cyst causing one-sided pelvic pain but her symptoms are not typical and the timing is not typical.  The fact that she had a negative ultrasound recently about a week after having similar symptoms and is not very helpful in ruling out an ovarian cyst.  She tells me that this pain is not typical for her SI pain related to ankylosing spondylitis.  It is not radiating from the back or obvious nerve irritation type pain.  Less concern for  endometriosis given the character pain, sudden onset, etc.  Not obviously bladder related as she is without those symptoms.  Not obviously bowel related as she is without those symptoms.  Gynecology follow-up?  I will sign this out to Dr. Lew.    1:29 AM  Signout taken from Dr. Dadren pending a CT scan.  CT was reviewed and is negative for stone or any other concerning findings.  Patient was reassessed and reports feeling well and not having any symptoms at this time.  Unclear etiology of her pain.  Pain always comes on around the time of her menses.  I do query if she could have endometriosis or another gynecological cause of her pain.  I recommended that she follow-up with gynecology.  She is comfortable going home.  Return precautions were discussed.    Electronic medical chart reviewed, including medical problems, medications, medical allergies, social history.  Recent hospitalizations and surgical procedures reviewed.  Recent clinic visits and consultations reviewed.  Recent labs and test results reviewed.  Nursing notes reviewed.    The patient, their parent if applicable, and/or their medical decision maker(s) and I have reviewed all of the available historical information, applicable PMH, physical exam findings, and objective diagnostic data gathered during this ED visit.  We then discussed all work-up options and then together agreed upon the course taken during this visit.  The ultimate disposition and plan was a cooperative decision made between myself and the patient, their parent if applicable, and/or their legal decision maker(s).  The risks and benefits of all decisions made during this visit were discussed to the best of my abilities given the circumstances, and all parties are understanding of the pertinent ramifications of these decisions.      LABS  Labs Ordered and Resulted from Time of ED Arrival to Time of ED Departure   BASIC METABOLIC PANEL - Abnormal       Result Value    Sodium 138       Potassium 4.1      Chloride 105      Carbon Dioxide (CO2) 22      Anion Gap 11      Urea Nitrogen 18.0      Creatinine 1.02 (*)     GFR Estimate 75      Calcium 9.4      Glucose 109 (*)    CBC WITH PLATELETS AND DIFFERENTIAL    WBC Count 9.4      RBC Count 4.17      Hemoglobin 13.3      Hematocrit 38.1      MCV 91      MCH 31.9      MCHC 34.9      RDW 12.7      Platelet Count 281      % Neutrophils 72      % Lymphocytes 21      % Monocytes 5      % Eosinophils 1      % Basophils 1      % Immature Granulocytes 0      NRBCs per 100 WBC 0      Absolute Neutrophils 6.7      Absolute Lymphocytes 2.0      Absolute Monocytes 0.4      Absolute Eosinophils 0.1      Absolute Basophils 0.1      Absolute Immature Granulocytes 0.0      Absolute NRBCs 0.0     ROUTINE UA WITH MICROSCOPIC REFLEX TO CULTURE   HCG QUALITATIVE PREGNANCY       IMAGING  Images reviewed by me.  Radiology report also reviewed.  CT Abdomen Pelvis w/o Contrast    (Results Pending)       Procedures    Medications   sodium chloride 0.9% BOLUS 1,000 mL (1,000 mLs Intravenous $New Bag 9/13/24 0011)         IMPRESSION   LLQ abdominal pain / Left sided pelvic pain       Medication List      There are no discharge medications for this visit.                             Aaron Darden MD  09/13/24 0044       Kale Lew MD  09/13/24 0129

## 2024-09-13 NOTE — ED TRIAGE NOTES
Arrives with LLQ abdominal pain that started around 2100. Comes in waves and describes it as contraction pain. Tried Tylenol and Ibuprofen with no relief. Had a similar episode about a month ago and had CT and US. Denies nausea and vomiting.     Triage Assessment (Adult)       Row Name 09/12/24 8324          Triage Assessment    Airway WDL WDL        Respiratory WDL    Respiratory WDL WDL        Skin Circulation/Temperature WDL    Skin Circulation/Temperature WDL WDL        Cardiac WDL    Cardiac WDL WDL        Peripheral/Neurovascular WDL    Peripheral Neurovascular WDL WDL        Cognitive/Neuro/Behavioral WDL    Cognitive/Neuro/Behavioral WDL WDL

## 2024-09-15 ENCOUNTER — E-VISIT (OUTPATIENT)
Dept: FAMILY MEDICINE | Facility: CLINIC | Age: 34
End: 2024-09-15
Payer: COMMERCIAL

## 2024-09-15 DIAGNOSIS — Z79.899 DRUG-INDUCED IMMUNODEFICIENCY (H): ICD-10-CM

## 2024-09-15 DIAGNOSIS — D84.821 DRUG-INDUCED IMMUNODEFICIENCY (H): ICD-10-CM

## 2024-09-15 DIAGNOSIS — M06.9 RHEUMATOID ARTHRITIS, INVOLVING UNSPECIFIED SITE, UNSPECIFIED WHETHER RHEUMATOID FACTOR PRESENT (H): ICD-10-CM

## 2024-09-15 DIAGNOSIS — J06.9 UPPER RESPIRATORY TRACT INFECTION, UNSPECIFIED TYPE: Primary | ICD-10-CM

## 2024-09-15 PROCEDURE — 99421 OL DIG E/M SVC 5-10 MIN: CPT | Performed by: PHYSICIAN ASSISTANT

## 2024-09-29 ENCOUNTER — MYC MEDICAL ADVICE (OUTPATIENT)
Dept: FAMILY MEDICINE | Facility: CLINIC | Age: 34
End: 2024-09-29
Payer: COMMERCIAL

## 2024-09-29 DIAGNOSIS — I49.1 PAC (PREMATURE ATRIAL CONTRACTION): Primary | ICD-10-CM

## 2024-09-30 ENCOUNTER — HOSPITAL ENCOUNTER (EMERGENCY)
Facility: CLINIC | Age: 34
Discharge: HOME | End: 2024-09-30
Attending: EMERGENCY MEDICINE | Admitting: EMERGENCY MEDICINE
Payer: COMMERCIAL

## 2024-09-30 ENCOUNTER — APPOINTMENT (OUTPATIENT)
Dept: GENERAL RADIOLOGY | Facility: CLINIC | Age: 34
End: 2024-09-30
Attending: EMERGENCY MEDICINE
Payer: COMMERCIAL

## 2024-09-30 ENCOUNTER — MYC MEDICAL ADVICE (OUTPATIENT)
Dept: FAMILY MEDICINE | Facility: CLINIC | Age: 34
End: 2024-09-30

## 2024-09-30 ENCOUNTER — ORDERS ONLY (AUTO-RELEASED) (OUTPATIENT)
Dept: FAMILY MEDICINE | Facility: CLINIC | Age: 34
End: 2024-09-30
Payer: COMMERCIAL

## 2024-09-30 VITALS
HEART RATE: 85 BPM | RESPIRATION RATE: 18 BRPM | SYSTOLIC BLOOD PRESSURE: 120 MMHG | DIASTOLIC BLOOD PRESSURE: 81 MMHG | OXYGEN SATURATION: 98 % | TEMPERATURE: 97.9 F

## 2024-09-30 DIAGNOSIS — I49.1 PAC (PREMATURE ATRIAL CONTRACTION): ICD-10-CM

## 2024-09-30 DIAGNOSIS — R00.2 PALPITATIONS: ICD-10-CM

## 2024-09-30 LAB
ANION GAP SERPL CALCULATED.3IONS-SCNC: 8 MMOL/L (ref 7–15)
BASOPHILS # BLD AUTO: 0.1 10E3/UL (ref 0–0.2)
BASOPHILS NFR BLD AUTO: 2 %
BUN SERPL-MCNC: 9 MG/DL (ref 6–20)
CALCIUM SERPL-MCNC: 9.1 MG/DL (ref 8.8–10.4)
CHLORIDE SERPL-SCNC: 103 MMOL/L (ref 98–107)
CREAT SERPL-MCNC: 0.99 MG/DL (ref 0.51–0.95)
EGFRCR SERPLBLD CKD-EPI 2021: 77 ML/MIN/1.73M2
EOSINOPHIL # BLD AUTO: 0.3 10E3/UL (ref 0–0.7)
EOSINOPHIL NFR BLD AUTO: 5 %
ERYTHROCYTE [DISTWIDTH] IN BLOOD BY AUTOMATED COUNT: 13 % (ref 10–15)
GLUCOSE SERPL-MCNC: 97 MG/DL (ref 70–99)
HCO3 SERPL-SCNC: 26 MMOL/L (ref 22–29)
HCT VFR BLD AUTO: 39.8 % (ref 35–47)
HGB BLD-MCNC: 13.6 G/DL (ref 11.7–15.7)
IMM GRANULOCYTES # BLD: 0 10E3/UL
IMM GRANULOCYTES NFR BLD: 0 %
LYMPHOCYTES # BLD AUTO: 2 10E3/UL (ref 0.8–5.3)
LYMPHOCYTES NFR BLD AUTO: 34 %
MCH RBC QN AUTO: 31.9 PG (ref 26.5–33)
MCHC RBC AUTO-ENTMCNC: 34.2 G/DL (ref 31.5–36.5)
MCV RBC AUTO: 93 FL (ref 78–100)
MONOCYTES # BLD AUTO: 0.4 10E3/UL (ref 0–1.3)
MONOCYTES NFR BLD AUTO: 7 %
NEUTROPHILS # BLD AUTO: 3.1 10E3/UL (ref 1.6–8.3)
NEUTROPHILS NFR BLD AUTO: 52 %
NRBC # BLD AUTO: 0 10E3/UL
NRBC BLD AUTO-RTO: 0 /100
PLATELET # BLD AUTO: 262 10E3/UL (ref 150–450)
POTASSIUM SERPL-SCNC: 3.9 MMOL/L (ref 3.4–5.3)
RBC # BLD AUTO: 4.26 10E6/UL (ref 3.8–5.2)
SODIUM SERPL-SCNC: 137 MMOL/L (ref 135–145)
TROPONIN T SERPL HS-MCNC: <6 NG/L
TSH SERPL DL<=0.005 MIU/L-ACNC: 0.64 UIU/ML (ref 0.3–4.2)
WBC # BLD AUTO: 5.9 10E3/UL (ref 4–11)

## 2024-09-30 PROCEDURE — 36415 COLL VENOUS BLD VENIPUNCTURE: CPT | Performed by: EMERGENCY MEDICINE

## 2024-09-30 PROCEDURE — 99285 EMERGENCY DEPT VISIT HI MDM: CPT | Mod: 25

## 2024-09-30 PROCEDURE — 93010 ELECTROCARDIOGRAM REPORT: CPT | Performed by: EMERGENCY MEDICINE

## 2024-09-30 PROCEDURE — 84443 ASSAY THYROID STIM HORMONE: CPT | Performed by: EMERGENCY MEDICINE

## 2024-09-30 PROCEDURE — 85025 COMPLETE CBC W/AUTO DIFF WBC: CPT | Performed by: EMERGENCY MEDICINE

## 2024-09-30 PROCEDURE — 80048 BASIC METABOLIC PNL TOTAL CA: CPT | Performed by: EMERGENCY MEDICINE

## 2024-09-30 PROCEDURE — 99284 EMERGENCY DEPT VISIT MOD MDM: CPT | Performed by: EMERGENCY MEDICINE

## 2024-09-30 PROCEDURE — 71046 X-RAY EXAM CHEST 2 VIEWS: CPT

## 2024-09-30 PROCEDURE — 84484 ASSAY OF TROPONIN QUANT: CPT | Performed by: EMERGENCY MEDICINE

## 2024-09-30 PROCEDURE — 93005 ELECTROCARDIOGRAM TRACING: CPT

## 2024-09-30 ASSESSMENT — COLUMBIA-SUICIDE SEVERITY RATING SCALE - C-SSRS
6. HAVE YOU EVER DONE ANYTHING, STARTED TO DO ANYTHING, OR PREPARED TO DO ANYTHING TO END YOUR LIFE?: NO
1. IN THE PAST MONTH, HAVE YOU WISHED YOU WERE DEAD OR WISHED YOU COULD GO TO SLEEP AND NOT WAKE UP?: NO
2. HAVE YOU ACTUALLY HAD ANY THOUGHTS OF KILLING YOURSELF IN THE PAST MONTH?: NO

## 2024-09-30 ASSESSMENT — ACTIVITIES OF DAILY LIVING (ADL)
ADLS_ACUITY_SCORE: 35

## 2024-09-30 NOTE — DISCHARGE INSTRUCTIONS
Your evaluation in the emergency department was reassuring.  Your ECG showed premature atrial contractions which are the likely cause of your symptoms.  Follow-up with your primary care provider and can consider outpatient cardiac monitoring in the future.  Return to emergency department if you develop chest pain or trouble breathing.

## 2024-09-30 NOTE — TELEPHONE ENCOUNTER
Routing to provider to view this mychart. Patient is going to ED for cardiac symptoms. She wanted to make sure provider say message about breast reductions and antibiotic.  Wilma Iglesias RN on 9/30/2024 at 11:23 AM

## 2024-09-30 NOTE — ED TRIAGE NOTES
"Patient coming in today with c/o palpitations and \"airy or out of it\" feeling. Denies chest pain with the palpitations. She also notes she was recently on a course of antibiotics for a suspected respiratory infection and finished her medication four days ago.      Triage Assessment (Adult)       Row Name 09/30/24 1204          Triage Assessment    Airway WDL WDL        Respiratory WDL    Respiratory WDL X;cough     Cough Frequency frequent     Cough Type assisted        Cardiac WDL    Cardiac WDL X        Peripheral/Neurovascular WDL    Peripheral Neurovascular WDL WDL        Cognitive/Neuro/Behavioral WDL    Cognitive/Neuro/Behavioral WDL WDL                     "

## 2024-09-30 NOTE — ED PROVIDER NOTES
"  History     Chief Complaint   Patient presents with    Palpitations     HPI  Wilma Palacios is a 34 year old female with history of rheumatoid arthritis, ankylosing spondylitis, chronic chest wall pain, drug-induced immunodeficiency, presents for evaluation of palpitations.  Started 2 days ago.  Has difficult time describing them.  Happens fairly frequently and feels like a flutter in her chest.  She has never had these before.  Also says that she feels a little \"area\" but not lightheaded or dizzy.  She denies any nausea vomiting or diarrhea.  No fevers or chills.  Had a recent respiratory infection and said she finished her antibiotics a few days ago and is feeling improved.  She is on phentermine for weight loss and had her dose increased slightly but this was about 2 months ago.  Is also on Farxiga for renal disease but this has been for a month or more.  Does not any herbal supplements or other dietary aids at home that are new.    The patient's PMHx, Surgical Hx, Allergies, and Medications were all reviewed with the patient.    Allergies:  Allergies   Allergen Reactions    Acetic Acid Anaphylaxis, Itching and Rash    Ascorbate Anaphylaxis     All Fruits.    Food Other (See Comments)     Berries (fruit)  Throat closes, itchy    Lycopene      Other reaction(s): Angioedema    Nuts Swelling and Nausea and Vomiting    Pistachio Nut Extract Anaphylaxis    Sulfa Antibiotics     Other Drug Allergy (See Comments) Hives and Rash     BLEACH       Problem List:    Patient Active Problem List    Diagnosis Date Noted    Drug-induced immunodeficiency (H) 02/24/2022     Priority: Medium    Subchorionic hemorrhage in first trimester 01/22/2022     Priority: Medium    Clinical diagnosis of COVID-19 05/03/2021     Priority: Medium    Cervical cancer screening 11/25/2020     Priority: Medium     2009 NIL pap  2013 NIL pap.  2014 NIL pap.  2017 NIL pap  11/19/20 NIL pap. Plan cotest in 1 year due bef 11/19/21 due to " immunosuppression.   21 NIL Pap, Neg HPV. Plan cotest in 1 year. If NIL Pap, Neg HPV, then every 3 years.   22 Pt currently pregnant, seeing [facility]. Scheduled c/s on . Will send reminders after delivery.  22 Reminder mychart  22 [facility] office note :Pap smear NILM 2021. Repeat with HPV co-testing in .        Rheumatoid arthritis (H) 2020     Priority: Medium    Chronic chest wall pain 2018     Priority: Medium     Overview:   Secondary to spondyloarthropathy       HLA-B27 spondyloarthropathy 2018     Priority: Medium     Overview:   2nd opinion confirmed at [place], diagnosed at [facility]       Ankylosing spondylitis of multiple sites in spine (H) 10/04/2018     Priority: Medium     Rheumatology note from [place] (1/15/19)  scanned into Epic.  Does not think there are any contraindications for vaginal birth.  Follow up with them 32-34 wks.  Pneumonia vaccine in 2nd or 3rd trimester.  Also follow up 6-8 weeks after delivery, sooner as needed      Anxiety 2017     Priority: Medium    Allergic rhinitis 2014     Priority: Medium    Hypothyroidism 2013     Priority: Medium        Past Medical History:    Past Medical History:   Diagnosis Date    Ankylosing spondylitis with multisystem involvement (H)     Anxiety     Rheumatoid arthritis (H) 2020    Thyroid disease        Past Surgical History:    Past Surgical History:   Procedure Laterality Date     SECTION  2019    COLONOSCOPY  2015    COLONOSCOPY      EXTRACTION(S) DENTAL  2009       Family History:    Family History   Problem Relation Age of Onset    Skin Cancer Maternal Grandfather     Rheumatoid Arthritis Maternal Grandfather     Heart Disease Paternal Grandmother        Social History:  Marital Status:   [2]  Social History     Tobacco Use    Smoking status: Former     Current packs/day: 0.00     Types: Cigarettes     Quit date: 2013     Years since quitting:  11.7    Smokeless tobacco: Never   Vaping Use    Vaping status: Never Used   Substance Use Topics    Alcohol use: Not Currently     Comment: rarely    Drug use: No        Medications:    albuterol (PROVENTIL) (2.5 MG/3ML) 0.083% neb solution  citalopram (CELEXA) 10 MG tablet  ENBREL MINI 50 MG/ML SOCT  fluticasone (FLOVENT HFA) 110 MCG/ACT inhaler  hydrOXYzine (ATARAX) 25 MG tablet  hydrOXYzine (ATARAX) 25 MG tablet  losartan (COZAAR) 100 MG tablet  metroNIDAZOLE (METROGEL) 0.75 % vaginal gel  metroNIDAZOLE (METROGEL) 0.75 % vaginal gel  mirabegron (MYRBETRIQ) 25 MG 24 hr tablet  phentermine (ADIPEX-P) 37.5 MG capsule  SYNTHROID 125 MCG tablet  vitamin D2 (ERGOCALCIFEROL) 40217 units (1250 mcg) capsule          Review of Systems  Pertinent positives and negatives mentioned in HPI    Physical Exam   BP: 126/71  Pulse: 87  Temp: 97.9  F (36.6  C)  Resp: 18  SpO2: 98 %    GEN: Awake, alert, and cooperative.  Resting comfortably  CV : Regular rate.  Irregular rhythm.  PULM: Normal effort. No wheezes, rales, or rhonchi bilaterally.  ABD: Soft, non-tender, non-distended. No rebound or guarding.   NEURO: Normal speech. Following commands. Answering questions and interacting appropriately.   EXT: No edema or tenderness.  INT: Warm. No diaphoresis. Normal color.        ED Course        Procedures         EKG: Interpreted by Mitch Bejarano MD sinus rhythm with rate of 81 bpm normal axis.  Normal R wave progression.  Occasional premature atrial contraction.  No ST segment elevations or depressions.  Impression sinus rhythm with occasional PACs.    Critical Care time:  none              Results for orders placed or performed during the hospital encounter of 09/30/24 (from the past 24 hour(s))   CBC with platelets differential    Narrative    The following orders were created for panel order CBC with platelets differential.  Procedure                               Abnormality         Status                     ---------                                -----------         ------                     CBC with platelets and d...[[id number]]                      Final result                 Please view results for these tests on the individual orders.   Basic metabolic panel   Result Value Ref Range    Sodium 137 135 - 145 mmol/L    Potassium 3.9 3.4 - 5.3 mmol/L    Chloride 103 98 - 107 mmol/L    Carbon Dioxide (CO2) 26 22 - 29 mmol/L    Anion Gap 8 7 - 15 mmol/L    Urea Nitrogen 9.0 6.0 - 20.0 mg/dL    Creatinine 0.99 (H) 0.51 - 0.95 mg/dL    GFR Estimate 77 >60 mL/min/1.73m2    Calcium 9.1 8.8 - 10.4 mg/dL    Glucose 97 70 - 99 mg/dL   Troponin T, High Sensitivity   Result Value Ref Range    Troponin T, High Sensitivity <6 <=14 ng/L   TSH with free T4 reflex   Result Value Ref Range    TSH 0.64 0.30 - 4.20 uIU/mL   CBC with platelets and differential   Result Value Ref Range    WBC Count 5.9 4.0 - 11.0 10e3/uL    RBC Count 4.26 3.80 - 5.20 10e6/uL    Hemoglobin 13.6 11.7 - 15.7 g/dL    Hematocrit 39.8 35.0 - 47.0 %    MCV 93 78 - 100 fL    MCH 31.9 26.5 - 33.0 pg    MCHC 34.2 31.5 - 36.5 g/dL    RDW 13.0 10.0 - 15.0 %    Platelet Count 262 150 - 450 10e3/uL    % Neutrophils 52 %    % Lymphocytes 34 %    % Monocytes 7 %    % Eosinophils 5 %    % Basophils 2 %    % Immature Granulocytes 0 %    NRBCs per 100 WBC 0 <1 /100    Absolute Neutrophils 3.1 1.6 - 8.3 10e3/uL    Absolute Lymphocytes 2.0 0.8 - 5.3 10e3/uL    Absolute Monocytes 0.4 0.0 - 1.3 10e3/uL    Absolute Eosinophils 0.3 0.0 - 0.7 10e3/uL    Absolute Basophils 0.1 0.0 - 0.2 10e3/uL    Absolute Immature Granulocytes 0.0 <=0.4 10e3/uL    Absolute NRBCs 0.0 10e3/uL   Chest XR,  PA & LAT    Narrative    XR CHEST 2 VIEWS   9/30/2024 1:23 PM     HISTORY: recent pulm infection now w/ palpitations    COMPARISON: Chest radiograph 3/12/2022      Impression    IMPRESSION: No acute cardiopulmonary disease.    STEPHANIE LOPEZ MD         SYSTEM ID:  [id number]       Medications - No data to  display    Assessments & Plan (with Medical Decision Making)   34 year old female with past medical history rheumatoid arthritis, ankylosing spondylitis, chronic chest wall pain, drug-induced immunodeficiency, with two day of palpitations.  On exam is well-appearing.  Vital signs reassuring.  Does have sinus rhythm with occasional PACs on her ECG.  This would explain the symptoms she is having.  CBC normal.  TSH  normal at 0.64.  Creatinine with normal electrolytes and is 0.99.  Which is slightly improved from 2 weeks ago at which time it was 1.02.  High sensitive troponin is not elevated.  Chest x-ray without acute infiltrate, effusion or pneumothorax.  Images reviewed and interpreted independently. my suspicion is that the PACs are causing her symptoms and should not cause her any difficulty.  Would consider outpatient Holter monitoring to ensure there is no other sinister arrhythmia that is occurring.  Nothing triggered while she was on telemetry in the emergency department but the ECG is on the recording of 15 seconds snapshot.    Results discussed with patient.  She was to be with her primary care provider and consider ZIO monitor after their conversation.  She is comfortable going home and feels reassured by today's visit         I have reviewed the nursing notes.         New Prescriptions    No medications on file       Final diagnoses:   Palpitations - PACs     Mitch Bejarano MD        9/30/2024   [facility] EMERGENCY DEPT    Disclaimer: This note consists of words and symbols derived from keyboarding and dictation using voice recognition software.  As a result, there may be errors that have gone undetected.  Please consider this when interpreting information found in this note.               Mitch Bejarano MD  09/30/24 1141

## 2024-09-30 NOTE — TELEPHONE ENCOUNTER
Patient sent another message today. RN advised that she be seen in the ED for on going cardiac symptoms.   Wilma Iglesias RN on 9/30/2024 at 10:52 AM

## 2024-10-02 NOTE — TELEPHONE ENCOUNTER
Pt returns call, and is notified of PCP's message. Appt scheduled in [place] for this afternoon.    Trena Sommer RN  [facility]

## 2024-10-02 NOTE — TELEPHONE ENCOUNTER
Okay - at this point there has been a lot of back and forth my chart messages. Please let her know that the xray did not show infection but if she is still having symptoms or is feeling worse then she does need to be seen again.     Ashly

## 2024-10-03 ENCOUNTER — E-VISIT (OUTPATIENT)
Dept: FAMILY MEDICINE | Facility: CLINIC | Age: 34
End: 2024-10-03
Payer: COMMERCIAL

## 2024-10-03 ENCOUNTER — LAB (OUTPATIENT)
Dept: LAB | Facility: CLINIC | Age: 34
End: 2024-10-03
Payer: COMMERCIAL

## 2024-10-03 DIAGNOSIS — J30.9 ALLERGIC RHINITIS, UNSPECIFIED SEASONALITY, UNSPECIFIED TRIGGER: ICD-10-CM

## 2024-10-03 DIAGNOSIS — J01.90 ACUTE SINUSITIS WITH SYMPTOMS > 10 DAYS: ICD-10-CM

## 2024-10-03 DIAGNOSIS — N89.8 VAGINAL ITCHING: ICD-10-CM

## 2024-10-03 DIAGNOSIS — N89.8 VAGINAL ITCHING: Primary | ICD-10-CM

## 2024-10-03 PROCEDURE — 99422 OL DIG E/M SVC 11-20 MIN: CPT | Performed by: PHYSICIAN ASSISTANT

## 2024-10-03 PROCEDURE — 87210 SMEAR WET MOUNT SALINE/INK: CPT

## 2024-10-08 RX ORDER — ALBUTEROL SULFATE 0.83 MG/ML
2.5 SOLUTION RESPIRATORY (INHALATION) EVERY 6 HOURS PRN
Qty: 90 ML | Refills: 1 | Status: SHIPPED | OUTPATIENT
Start: 2024-10-08

## 2024-10-08 RX ORDER — ALBUTEROL SULFATE 90 UG/1
2 INHALANT RESPIRATORY (INHALATION) EVERY 6 HOURS PRN
Qty: 18 G | Refills: 1 | Status: SHIPPED | OUTPATIENT
Start: 2024-10-08

## 2024-10-14 ENCOUNTER — MYC MEDICAL ADVICE (OUTPATIENT)
Dept: FAMILY MEDICINE | Facility: CLINIC | Age: 34
End: 2024-10-14
Payer: COMMERCIAL

## 2024-10-16 ENCOUNTER — MYC MEDICAL ADVICE (OUTPATIENT)
Dept: FAMILY MEDICINE | Facility: CLINIC | Age: 34
End: 2024-10-16

## 2024-10-16 PROCEDURE — 93244 EXT ECG>48HR<7D REV&INTERPJ: CPT | Performed by: INTERNAL MEDICINE

## 2024-10-20 ENCOUNTER — APPOINTMENT (OUTPATIENT)
Dept: GENERAL RADIOLOGY | Facility: CLINIC | Age: 34
End: 2024-10-20
Attending: FAMILY MEDICINE
Payer: COMMERCIAL

## 2024-10-20 ENCOUNTER — HOSPITAL ENCOUNTER (EMERGENCY)
Facility: CLINIC | Age: 34
Discharge: HOME | End: 2024-10-20
Attending: FAMILY MEDICINE | Admitting: FAMILY MEDICINE
Payer: COMMERCIAL

## 2024-10-20 VITALS
WEIGHT: 187 LBS | SYSTOLIC BLOOD PRESSURE: 125 MMHG | BODY MASS INDEX: 29.29 KG/M2 | TEMPERATURE: 98.4 F | HEART RATE: 80 BPM | RESPIRATION RATE: 15 BRPM | OXYGEN SATURATION: 96 % | DIASTOLIC BLOOD PRESSURE: 86 MMHG

## 2024-10-20 DIAGNOSIS — R05.3 CHRONIC COUGH: ICD-10-CM

## 2024-10-20 DIAGNOSIS — J01.00 ACUTE NON-RECURRENT MAXILLARY SINUSITIS: ICD-10-CM

## 2024-10-20 DIAGNOSIS — J18.9 PNEUMONIA OF RIGHT MIDDLE LOBE DUE TO INFECTIOUS ORGANISM: ICD-10-CM

## 2024-10-20 DIAGNOSIS — D84.9 IMMUNOSUPPRESSED STATUS (H): ICD-10-CM

## 2024-10-20 DIAGNOSIS — J45.31 MILD PERSISTENT ASTHMA WITH ACUTE EXACERBATION: ICD-10-CM

## 2024-10-20 PROCEDURE — 99284 EMERGENCY DEPT VISIT MOD MDM: CPT | Mod: 25 | Performed by: FAMILY MEDICINE

## 2024-10-20 PROCEDURE — 71046 X-RAY EXAM CHEST 2 VIEWS: CPT

## 2024-10-20 PROCEDURE — 99284 EMERGENCY DEPT VISIT MOD MDM: CPT | Performed by: FAMILY MEDICINE

## 2024-10-20 RX ORDER — FLUCONAZOLE 150 MG/1
150 TABLET ORAL ONCE
Qty: 1 TABLET | Refills: 0 | Status: SHIPPED | OUTPATIENT
Start: 2024-10-20 | End: 2024-10-20

## 2024-10-20 RX ORDER — AZITHROMYCIN 250 MG/1
TABLET, FILM COATED ORAL
Qty: 6 TABLET | Refills: 0 | Status: SHIPPED | OUTPATIENT
Start: 2024-10-20

## 2024-10-20 RX ORDER — ONDANSETRON 4 MG/1
4 TABLET, ORALLY DISINTEGRATING ORAL EVERY 8 HOURS PRN
Qty: 10 TABLET | Refills: 0 | Status: SHIPPED | OUTPATIENT
Start: 2024-10-20

## 2024-10-20 ASSESSMENT — ACTIVITIES OF DAILY LIVING (ADL)
ADLS_ACUITY_SCORE: 35
ADLS_ACUITY_SCORE: 35

## 2024-10-20 ASSESSMENT — COLUMBIA-SUICIDE SEVERITY RATING SCALE - C-SSRS
6. HAVE YOU EVER DONE ANYTHING, STARTED TO DO ANYTHING, OR PREPARED TO DO ANYTHING TO END YOUR LIFE?: NO
2. HAVE YOU ACTUALLY HAD ANY THOUGHTS OF KILLING YOURSELF IN THE PAST MONTH?: NO
1. IN THE PAST MONTH, HAVE YOU WISHED YOU WERE DEAD OR WISHED YOU COULD GO TO SLEEP AND NOT WAKE UP?: NO

## 2024-10-20 NOTE — DISCHARGE INSTRUCTIONS
ICD-10-CM    1. Chronic cough  R05.3       2. Acute non-recurrent maxillary sinusitis  J01.00     take augmentin for 14 days. this will cause vaginal yeast infection.  use activa or acidophilus or other probiotic while on it,.  and if vag symptoms may use monistat or similar until antibiotics complete - then take diflucan x1 dose      3. Mild persistent asthma with acute exacerbation  J45.31     stay on flovent with albuterol rescue - and follow-up clinic. consider pulmonary functyion tests in clinic      4. Pneumonia of right middle lobe due to infectious organism  J18.9     unclear if this is residual from prior pneumonia. firsty course did not include atypical coverage - take azithromycin for that. will aslo cover for sinusitis - but longer course x14 days.        5. Immunosuppressed status (H)  D84.9     this is due to chronic prednisone, and for immune therapy for AS.  please contact your rheum to update them on status

## 2024-10-20 NOTE — ED TRIAGE NOTES
Coughing since labor day, put on antibiotics and got better and then worse. Pt is hacking up phlegm. Pt states she feels warm and doesn't think that she has had fevers. Pt states chest feels sore and tight. Pt had a chest x-ray a few weeks ago when she was here. Pt states she has been feeling more tired than usual but she does feel good overall and is worried about having a pneumonia.

## 2024-10-20 NOTE — ED PROVIDER NOTES
History     Chief Complaint   Patient presents with    Cough     HPI  Wilma Palacios is a 34 year old female who presents with a history of ankylosing spondylitis HLA-B27 positive rheumatoid arthritis.  Denies current pregnancy is on oral contraceptive.  History of asthma and on a Flovent inhaler as well as albuterol rescue.  Has been on antirheumatic agents.  Has a cough that had onset in September treated for possible pneumonia via a MyChart message and improved for some time and then worsened.  That treatment was for about 7 days.  However she has had also significant postnasal drainage she has facial pain left more than right.  A few episodes of emesis in the last day.  At times feels that her chest is tight sternal.  No significant wheezing.    Allergies:  Allergies   Allergen Reactions    Acetic Acid Anaphylaxis, Itching and Rash    Ascorbate Anaphylaxis     All Fruits.    Food Other (See Comments)     Berries (fruit)  Throat closes, itchy    Lycopene      Other reaction(s): Angioedema    Nuts Swelling and Nausea and Vomiting    Pistachio Nut Extract Anaphylaxis    Sulfa Antibiotics     Other Drug Allergy (See Comments) Hives and Rash     BLEACH       Problem List:    Patient Active Problem List    Diagnosis Date Noted    Drug-induced immunodeficiency (H) 02/24/2022     Priority: Medium    Subchorionic hemorrhage in first trimester 01/22/2022     Priority: Medium    Clinical diagnosis of COVID-19 05/03/2021     Priority: Medium    Cervical cancer screening 11/25/2020     Priority: Medium     2009 NIL pap  2013 NIL pap.  2014 NIL pap.  2017 NIL pap  11/19/20 NIL pap. Plan cotest in 1 year due bef 11/19/21 due to immunosuppression.   9/9/21 NIL Pap, Neg HPV. Plan cotest in 1 year. If NIL Pap, Neg HPV, then every 3 years.   8/19/22 Pt currently pregnant, seeing [facility]. Scheduled c/s on 9/1. Will send reminders after delivery.  9/6/22 Reminder mychart  9/27/22 [facility] office note :Pap smear NILM  2021. Repeat with HPV co-testing in .        Rheumatoid arthritis (H) 2020     Priority: Medium    Chronic chest wall pain 2018     Priority: Medium     Overview:   Secondary to spondyloarthropathy       HLA-B27 spondyloarthropathy 2018     Priority: Medium     Overview:   2nd opinion confirmed at [facility], diagnosed at [facility]       Ankylosing spondylitis of multiple sites in spine (H) 10/04/2018     Priority: Medium     Rheumatology note from [facility] (1/15/19)  scanned into Epic.  Does not think there are any contraindications for vaginal birth.  Follow up with them 32-34 wks.  Pneumonia vaccine in 2nd or 3rd trimester.  Also follow up 6-8 weeks after delivery, sooner as needed      Anxiety 2017     Priority: Medium    Allergic rhinitis 2014     Priority: Medium    Hypothyroidism 2013     Priority: Medium        Past Medical History:    Past Medical History:   Diagnosis Date    Ankylosing spondylitis with multisystem involvement (H) 2012    Anxiety     Rheumatoid arthritis (H) 2020    Thyroid disease        Past Surgical History:    Past Surgical History:   Procedure Laterality Date     SECTION  2019    COLONOSCOPY  2015    COLONOSCOPY      EXTRACTION(S) DENTAL  2009       Family History:    Family History   Problem Relation Age of Onset    Skin Cancer Maternal Grandfather     Rheumatoid Arthritis Maternal Grandfather     Heart Disease Paternal Grandmother        Social History:  Marital Status:   [2]  Social History     Tobacco Use    Smoking status: Former     Current packs/day: 0.00     Types: Cigarettes     Quit date: 2013     Years since quittin.8    Smokeless tobacco: Never   Vaping Use    Vaping status: Never Used   Substance Use Topics    Alcohol use: Not Currently     Comment: rarely    Drug use: No        Medications:    albuterol (PROAIR HFA/PROVENTIL HFA/VENTOLIN HFA) 108 (90 Base) MCG/ACT inhaler  albuterol (PROVENTIL) (2.5 MG/3ML)  0.083% neb solution  citalopram (CELEXA) 10 MG tablet  ENBREL MINI 50 MG/ML SOCT  fluticasone (FLOVENT HFA) 110 MCG/ACT inhaler  hydrOXYzine (ATARAX) 25 MG tablet  hydrOXYzine (ATARAX) 25 MG tablet  losartan (COZAAR) 100 MG tablet  metroNIDAZOLE (METROGEL) 0.75 % vaginal gel  metroNIDAZOLE (METROGEL) 0.75 % vaginal gel  mirabegron (MYRBETRIQ) 25 MG 24 hr tablet  phentermine (ADIPEX-P) 37.5 MG capsule  SYNTHROID 125 MCG tablet  vitamin D2 (ERGOCALCIFEROL) 70969 units (1250 mcg) capsule          Review of Systems  ROS:  5 point ROS negative except as noted above in HPI, including Gen., Resp., CV, GI &  system review.      Physical Exam   BP: 125/86  Pulse: 80  Temp: 98.4  F (36.9  C)  Resp: 18  Weight: 84.8 kg (187 lb)  SpO2: 94 %      Physical Exam  Constitutional:       General: She is in acute distress.      Appearance: She is not diaphoretic.   Eyes:      Conjunctiva/sclera: Conjunctivae normal.   Cardiovascular:      Rate and Rhythm: Normal rate and regular rhythm.      Heart sounds: No murmur heard.  Pulmonary:      Effort: Pulmonary effort is normal. No respiratory distress.      Breath sounds: Normal breath sounds. No stridor. No wheezing or rhonchi.   Musculoskeletal:      Cervical back: Neck supple.      Right lower leg: No edema.      Left lower leg: No edema.   Skin:     Coloration: Skin is not pale.      Findings: No rash.   Neurological:      Mental Status: She is alert.         Throat is clear.  TMs are clear.  Sinuses are mildly tender left frontal left maxillary      ED Course        Procedures              Critical Care time:  none              Results for orders placed or performed during the hospital encounter of 10/20/24 (from the past 24 hour(s))   Chest XR,  PA & LAT    Narrative    EXAM: XR CHEST 2 VIEWS  LOCATION: [facility]  DATE: 10/20/2024    INDICATION: persistent cough increased  COMPARISON: None.      Impression    IMPRESSION: Patchy increased airspace  opacities are seen within the left lower lobe, best seen on the lateral view concerning for developing airspace disease/pneumonia       Medications - No data to display    Assessments & Plan (with Medical Decision Making)     MDM; Wilma Palacios is a 34 year old female with persistent cough ongoing for at least a month.  Chest x-ray performed given increased productive cough suspect sinus source.  Likely will treat with 14 days of antibiotics for sinusitis follow-up in clinic.  There may be an asthma component would stay on Flovent, albuterol as needefd, but no systremic corticosteroids at this point    I reviewed her medications with her.  It turns out she is using 15 mg of prednisone currently for the last 10 days for her ankylosing spondylitis.  She is not taking her Flovent that she had previously been on as she was using this on an as-needed basis.  We discussed that she should stay on this as inhaled steroid until she can be reevaluated for her asthma and consider pulmonary function testing.  The albuterol I described is only for her rescue.  Likely will need more education outpatient as well as an asthma action plan.  She has not discussed her most recent use of the prednisone she was using this for pain in her low back for ankylosing spondylitis she has available as needed at home.  Has been on 10 days discussed the potential adverse effects with prednisone including osteoporosis she is only 34 now.  I recommend that she contact her rheumatology clinic.       We discussed her presentation today.  There are changes on x-ray that could be consistent with atypical pneumonia possibly mycoplasma which will likely improve regardless of treatment however there are changes on the x-ray consistent with pneumonia and would recommend we use azithromycin this time as her last course was without this.  We also discussed treating for sinusitis she has had chronic postnasal drainage.  And therefore will add Augmentin but  will treat for 14 days.  We discussed the use of probiotics with this and at the end of that course Diflucan is prescribed to take -if she has vaginal yeast infections after completing her Augmentin.  She may use Monistat until then if she develops vaginal yeast infection from.    She has a follow-up with her primary Tuesday to readdress signs symptoms.    I have reviewed the nursing notes.    I have reviewed the findings, diagnosis, plan and need for follow up with the patient.           Medical Decision Making  The patient's presentation was of moderate complexity (an acute illness with systemic symptoms).    The patient's evaluation involved:  ordering and/or review of 1 test(s) in this encounter (see separate area of note for details)    The patient's management necessitated moderate risk (prescription drug management including medications given in the ED).        New Prescriptions    No medications on file       Final diagnoses:   Chronic cough   Acute non-recurrent maxillary sinusitis - take augmentin for 14 days. this will cause vaginal yeast infection.  use activa or acidophilus or other probiotic while on it,.  and if vag symptoms may use monistat or similar until antibiotics complete - then take diflucan x1 dose   Mild persistent asthma with acute exacerbation - stay on flovent with albuterol rescue - and follow-up clinic. consider pulmonary functyion tests in clinic   Pneumonia of right middle lobe due to infectious organism - unclear if this is residual from prior pneumonia. firsty course did not include atypical coverage - take azithromycin for that. will aslo cover for sinusitis - but longer course x14 days.     Immunosuppressed status (H) - this is due to chronic prednisone, and for immune therapy for AS.  please contact your rheum to update them on status       10/20/2024   [facility] EMERGENCY DEPT       Galo Zuniga MD  10/20/24 [id number]

## 2024-10-22 ENCOUNTER — MYC MEDICAL ADVICE (OUTPATIENT)
Dept: FAMILY MEDICINE | Facility: CLINIC | Age: 34
End: 2024-10-22

## 2024-10-22 ENCOUNTER — VIRTUAL VISIT (OUTPATIENT)
Dept: FAMILY MEDICINE | Facility: CLINIC | Age: 34
End: 2024-10-22
Payer: COMMERCIAL

## 2024-10-22 DIAGNOSIS — J01.90 ACUTE SINUSITIS WITH SYMPTOMS > 10 DAYS: Primary | ICD-10-CM

## 2024-10-22 DIAGNOSIS — N80.9 ENDOMETRIOSIS: ICD-10-CM

## 2024-10-22 DIAGNOSIS — J18.9 PNEUMONIA OF LEFT LOWER LOBE DUE TO INFECTIOUS ORGANISM: ICD-10-CM

## 2024-10-22 DIAGNOSIS — R00.2 PALPITATIONS: ICD-10-CM

## 2024-10-22 PROCEDURE — 99214 OFFICE O/P EST MOD 30 MIN: CPT | Mod: 95 | Performed by: PHYSICIAN ASSISTANT

## 2024-10-22 PROCEDURE — G2211 COMPLEX E/M VISIT ADD ON: HCPCS | Mod: 95 | Performed by: PHYSICIAN ASSISTANT

## 2024-10-22 RX ORDER — NORETHINDRONE 5 MG/1
5 TABLET ORAL DAILY
COMMUNITY
Start: 2024-10-04

## 2024-10-22 RX ORDER — DAPAGLIFLOZIN 10 MG/1
10 TABLET, FILM COATED ORAL DAILY
COMMUNITY
Start: 2024-07-01

## 2024-10-22 NOTE — PROGRESS NOTES
"Wilma is a 34 year old who is being evaluated via a billable video visit.    How would you like to obtain your AVS? MyChart  If the video visit is dropped, the invitation should be resent by: Text to cell phone: [phone]  Will anyone else be joining your video visit? No    Assessment & Plan     (J01.90) Acute sinusitis with symptoms > 10 days  (primary encounter diagnosis)  Comment:    Plan: continue antibiotics - treating for both sinus and pneumonia. Given duration of illness and multiple antibiotics - if symptoms persist or return right away would consider imaging (CT sinus) net    (J18.9) Pneumonia of left lower lobe due to infectious organism  Comment:   Plan: as per above    (R00.2) Palpitations  Comment: reviewed zio patch with her - no concerning rhythms and most of her symptoms she felt did not correlate to any abnormality on the EKG  Plan: discussed triggers for the few PACs/PVCs that were noted. Reassured patient of normal findings.     (N80.9) Endometriosis  Comment:  Plan: Being followed by OB/GYN through [facility]              BMI  Estimated body mass index is 29.29 kg/m  as calculated from the following:    Height as of 7/22/24: 1.702 m (5' 7\").    Weight as of 10/20/24: 84.8 kg (187 lb).     Subjective   Wilma is a 34 year old, presenting for the following health issues:  Medication Reaction      10/22/2024    11:23 AM   Additional Questions   Roomed by TATIANNA Francisco     HPI       Patient is wanting to follow up with after having side effects from Phentermine.     -She has been battling a sinus infection for almost 2 months now. She is currently on antibiotics.     -Losartan was not on her med list when I went through them with her. She is concerned why it's not.     -She got diagnosed with endometriosis last week through [facility].     Extra beats might be getting a little better    Wondering if she should go back on the phentermine  Does feel like it was helping with weight " loss      Has been sick since Labor Day  Will get treated, get a little better but then a few days after stopping she will get sick again    Just in the ED recently because sinus pressure all returned as well as questionable pneumonia            Review of Systems  Remainder of ROS obtained and found to be negative other than that which was documented above        Objective           Vitals:  No vitals were obtained today due to virtual visit.    Physical Exam   GENERAL: alert and no distress  EYES: Eyes grossly normal to inspection.  No discharge or erythema, or obvious scleral/conjunctival abnormalities.  RESP: No audible wheeze, cough, or visible cyanosis.    SKIN: Visible skin clear. No significant rash, abnormal pigmentation or lesions.  NEURO: Cranial nerves grossly intact.  Mentation and speech appropriate for age.  PSYCH: Appropriate affect, tone, and pace of words    Diagnostic Tests: reviewed ED imaging and labs      Video-Visit Details    Type of service:  Video Visit   Originating Location (pt. Location): Home    Distant Location (provider location):  On-site  Platform used for Video Visit: Aishwarya  Signed Electronically by: Lea Avalos PA-C

## 2024-10-28 ENCOUNTER — MYC MEDICAL ADVICE (OUTPATIENT)
Dept: FAMILY MEDICINE | Facility: CLINIC | Age: 34
End: 2024-10-28
Payer: COMMERCIAL

## 2024-11-05 ENCOUNTER — TELEPHONE (OUTPATIENT)
Dept: FAMILY MEDICINE | Facility: CLINIC | Age: 34
End: 2024-11-05
Payer: COMMERCIAL

## 2024-11-05 NOTE — TELEPHONE ENCOUNTER
Patient Contact     Attempt # 1     Was call answered?  No.  Left message on voicemail with information to call back.         Francisca Dubon RN on 11/5/2024 at 11:03 AM

## 2024-11-05 NOTE — TELEPHONE ENCOUNTER
Reason for Call:  Appointment Request    Patient requesting this type of appt:  LAB    Requested provider: LAB    Reason patient unable to be scheduled: Not within requested timeframe    When does patient want to be seen/preferred time: Same day    Comments: PT IS WANTING TO DO SAME DAY LAB FOR A WET PREP FOR TODAY AT [facility].     Could we send this information to you in [employer] or would you prefer to receive a phone call?:   NO PREFERENCE   Okay to leave a detailed message?: Yes at Cell number on file:    Telephone Information:   Mobile [phone]       Call taken on 11/5/2024 at 7:31 AM by Carol Bender

## 2024-11-06 ENCOUNTER — E-VISIT (OUTPATIENT)
Dept: FAMILY MEDICINE | Facility: CLINIC | Age: 34
End: 2024-11-06
Payer: COMMERCIAL

## 2024-11-06 ENCOUNTER — MYC MEDICAL ADVICE (OUTPATIENT)
Dept: FAMILY MEDICINE | Facility: CLINIC | Age: 34
End: 2024-11-06

## 2024-11-06 DIAGNOSIS — N30.01 ACUTE CYSTITIS WITH HEMATURIA: ICD-10-CM

## 2024-11-06 DIAGNOSIS — R39.15 URINARY URGENCY: Primary | ICD-10-CM

## 2024-11-06 DIAGNOSIS — N89.8 VAGINAL DISCHARGE: ICD-10-CM

## 2024-11-06 PROCEDURE — 99422 OL DIG E/M SVC 11-20 MIN: CPT | Performed by: PHYSICIAN ASSISTANT

## 2024-11-06 NOTE — TELEPHONE ENCOUNTER
Patient Contact    Attempt # 2    Was call answered?  No.  Left message on voicemail with information to call back.    Left detailed message to inform patient that she needs some type of visit to obtain order for wet prep. (E-visit or virtual visit)    Deborah Pitt RN on 11/6/2024 at 1:08 PM

## 2024-11-07 ENCOUNTER — TELEPHONE (OUTPATIENT)
Dept: FAMILY MEDICINE | Facility: CLINIC | Age: 34
End: 2024-11-07

## 2024-11-07 ENCOUNTER — MYC MEDICAL ADVICE (OUTPATIENT)
Dept: FAMILY MEDICINE | Facility: CLINIC | Age: 34
End: 2024-11-07

## 2024-11-07 ENCOUNTER — LAB (OUTPATIENT)
Dept: LAB | Facility: CLINIC | Age: 34
End: 2024-11-07
Payer: COMMERCIAL

## 2024-11-07 DIAGNOSIS — N89.8 VAGINAL DISCHARGE: ICD-10-CM

## 2024-11-07 DIAGNOSIS — R39.15 URINARY URGENCY: ICD-10-CM

## 2024-11-07 LAB
ALBUMIN UR-MCNC: 100 MG/DL
APPEARANCE UR: CLEAR
BACTERIA #/AREA URNS HPF: ABNORMAL /HPF
BILIRUB UR QL STRIP: NEGATIVE
CLUE CELLS: NORMAL
COLOR UR AUTO: YELLOW
GLUCOSE UR STRIP-MCNC: NEGATIVE MG/DL
HGB UR QL STRIP: ABNORMAL
KETONES UR STRIP-MCNC: NEGATIVE MG/DL
LEUKOCYTE ESTERASE UR QL STRIP: NEGATIVE
NITRATE UR QL: POSITIVE
PH UR STRIP: 7 [PH] (ref 5–7)
RBC #/AREA URNS AUTO: ABNORMAL /HPF
SP GR UR STRIP: 1.02 (ref 1–1.03)
SQUAMOUS #/AREA URNS AUTO: ABNORMAL /LPF
TRICHOMONAS, WET PREP: NORMAL
UROBILINOGEN UR STRIP-ACNC: 0.2 E.U./DL
WBC #/AREA URNS AUTO: ABNORMAL /HPF
WBC CLUMPS #/AREA URNS HPF: PRESENT /HPF
WBC'S/HIGH POWER FIELD, WET PREP: NORMAL
YEAST, WET PREP: NORMAL

## 2024-11-07 PROCEDURE — 81001 URINALYSIS AUTO W/SCOPE: CPT

## 2024-11-07 PROCEDURE — 87210 SMEAR WET MOUNT SALINE/INK: CPT

## 2024-11-07 PROCEDURE — 87086 URINE CULTURE/COLONY COUNT: CPT

## 2024-11-07 RX ORDER — CEFDINIR 300 MG/1
300 CAPSULE ORAL 2 TIMES DAILY
Qty: 10 CAPSULE | Refills: 0 | Status: SHIPPED | OUTPATIENT
Start: 2024-11-07 | End: 2024-11-12

## 2024-11-07 NOTE — TELEPHONE ENCOUNTER
Patient Returning Call    Reason for call:  She has a lab appt this morning at 11:30. She has scheduled an eVisit with her PCP today. She wants to make sure that her PCP is here today because she does not want to miss the lab visit, she feels she needs an order for it.     Information relayed to patient:      Patient has additional questions:  Yes Patient wants to know if she can have an open order for Lab every month since this is reoccurring    What are your questions/concerns:  She does not want to be turned away from her lab appt since she does not technically have an order in    Could we send this information to you in [employer] or would you prefer to receive a phone call?:   No preference   Okay to leave a detailed message?: Yes at Cell number on file:    Telephone Information:   Mobile [phone]

## 2024-11-07 NOTE — CONFIDENTIAL NOTE
Labs were ordered. I responded to patient via the e visit. Results will be relayed through the e visit as well  Lea Avalos PA-C

## 2024-11-07 NOTE — TELEPHONE ENCOUNTER
Chart reviewed.    Patient completed labs.    Closing encounter.    Travis ANSARI RN  [facility]

## 2024-11-07 NOTE — TELEPHONE ENCOUNTER
Lea Avalos:    Patient submitted E-visit to you today and would like orders ready for her lab appointment today, requesting UA and wet prep, please review, cued up if appropriate, please return to care team to let patient know orders are in place.      LEONIE Dvais

## 2024-11-08 ENCOUNTER — MYC MEDICAL ADVICE (OUTPATIENT)
Dept: FAMILY MEDICINE | Facility: CLINIC | Age: 34
End: 2024-11-08
Payer: COMMERCIAL

## 2024-11-08 DIAGNOSIS — N89.8 VAGINAL IRRITATION: Primary | ICD-10-CM

## 2024-11-08 LAB — BACTERIA UR CULT: NORMAL

## 2024-11-13 ENCOUNTER — MYC MEDICAL ADVICE (OUTPATIENT)
Dept: FAMILY MEDICINE | Facility: CLINIC | Age: 34
End: 2024-11-13

## 2024-11-13 DIAGNOSIS — M47.899 HLA-B27 SPONDYLOARTHROPATHY: ICD-10-CM

## 2024-11-13 DIAGNOSIS — M06.9 RHEUMATOID ARTHRITIS, INVOLVING UNSPECIFIED SITE, UNSPECIFIED WHETHER RHEUMATOID FACTOR PRESENT (H): ICD-10-CM

## 2024-11-13 DIAGNOSIS — E66.01 MORBID OBESITY (H): Primary | ICD-10-CM

## 2024-11-15 ENCOUNTER — OFFICE VISIT (OUTPATIENT)
Dept: NEUROLOGY | Facility: CLINIC | Age: 34
End: 2024-11-15
Attending: PHYSICIAN ASSISTANT
Payer: COMMERCIAL

## 2024-11-15 VITALS
BODY MASS INDEX: 29.35 KG/M2 | SYSTOLIC BLOOD PRESSURE: 139 MMHG | HEIGHT: 67 IN | DIASTOLIC BLOOD PRESSURE: 98 MMHG | HEART RATE: 75 BPM | WEIGHT: 187 LBS

## 2024-11-15 DIAGNOSIS — R20.0 NUMBNESS AND TINGLING OF BOTH UPPER EXTREMITIES: ICD-10-CM

## 2024-11-15 DIAGNOSIS — R33.9 URINE RETENTION: Primary | ICD-10-CM

## 2024-11-15 DIAGNOSIS — R20.2 NUMBNESS AND TINGLING OF BOTH UPPER EXTREMITIES: ICD-10-CM

## 2024-11-15 RX ORDER — FLUCONAZOLE 150 MG/1
150 TABLET ORAL DAILY
COMMUNITY

## 2024-11-15 RX ORDER — GOLIMUMAB 50 MG/.5ML
50 INJECTION, SOLUTION SUBCUTANEOUS
COMMUNITY
Start: 2024-09-18

## 2024-11-15 RX ORDER — PREDNISONE 5 MG/1
5 TABLET ORAL DAILY
COMMUNITY
Start: 2024-06-25

## 2024-11-15 RX ORDER — PREDNISONE 10 MG/1
10 TABLET ORAL DAILY
COMMUNITY
Start: 2024-06-25

## 2024-11-15 NOTE — PROGRESS NOTES
"NEUROLOGY OUTPATIENT CONSULT NOTE   Nov 15, 2024     CHIEF COMPLAINT/REASON FOR VISIT/REASON FOR CONSULT  Patient presents with:  Numbness: Numbness and tingling if both upper extremities. Patient states symptoms started in the hands and traveled up to her mid arm. \"It feels like a tingling more than a numbness, like when on a roller coaster and you go down fast.\" Very lightheadedness and uncontrolled bladder frequency. It has been going on since February.     REASON FOR CONSULTATION-lightheadedness, bilateral hand numbness, bladder issues    REFERRAL SOURCE  Dr. Lea Helton*  CC Dr. Lea Helton*    HISTORY OF PRESENT ILLNESS  Wilma Palacios is a 34 year old female seen today for multiple issues.  Symptoms started about a year ago.  There was no clear provoking factor.  In  she had a  and had an epidural for that.  She reports that it was a difficult epidural though symptoms did not start right at that time.    In terms of her hand numbness all 5 fingers in both hands are involved.  Symptoms came on at the same time on both sides.  They eventually spread to the mid forearm.  Describes it more as an uncomfortable sensation.  She is calling it numbness though it might not be true numbness.  Has difficulty with opening jars along with small objects.  No difficulty with heavy objects.  Denies any shooting pain down the arm.  No symptoms in the legs.  No major neck pain.    She further complains of bladder issues.  Has seen the urologist and no clear cause has been identified.  She describes having a sensation of something pushing on her bladder all the time and her having to go to the bathroom though she does not have to pee all the time.  No incontinence.  Has seen gynecology and they have identified endometriosis which is not suspected to cause her symptoms.    She further complains of lightheadedness/unsteadiness coming from her neck.  She describes it just mainly in the back of " her head and this comes on with turning the head at times.    Previous history is reviewed and this is unchanged.    PAST MEDICAL/SURGICAL HISTORY  Past Medical History:   Diagnosis Date    Ankylosing spondylitis with multisystem involvement (H) 2012    Anxiety     Rheumatoid arthritis (H) 2020    Thyroid disease 2008     Patient Active Problem List   Diagnosis    Ankylosing spondylitis of multiple sites in spine (H)    Allergic rhinitis    Anxiety    Chronic chest wall pain    HLA-B27 spondyloarthropathy    Hypothyroidism    Rheumatoid arthritis (H)    Cervical cancer screening    Clinical diagnosis of COVID-19    Subchorionic hemorrhage in first trimester    Drug-induced immunodeficiency (H)    Endometriosis   Significant bleeding tendency, arthritis, endometriosis, ankylosing spondylitis, kidney disease    FAMILY HISTORY  Family History   Problem Relation Age of Onset    Skin Cancer Maternal Grandfather     Rheumatoid Arthritis Maternal Grandfather     Heart Disease Paternal Grandmother    Positive for multiple sclerosis in her aunt.    SOCIAL HISTORY  Social History     Tobacco Use    Smoking status: Former     Current packs/day: 0.00     Types: Cigarettes     Quit date: 2013     Years since quittin.8    Smokeless tobacco: Never   Vaping Use    Vaping status: Never Used   Substance Use Topics    Alcohol use: Not Currently     Comment: rarely    Drug use: No       SYSTEMS REVIEW  Twelve-system ROS was done and other than the HPI this was negative/positive for back pain, joint pain, numbness/tingling, weakness/presses, bladder symptoms, dizziness, anxiety, palpitations.    MEDICATIONS  Current Outpatient Medications   Medication Sig Dispense Refill    albuterol (PROAIR HFA/PROVENTIL HFA/VENTOLIN HFA) 108 (90 Base) MCG/ACT inhaler Inhale 2 puffs into the lungs every 6 hours as needed for shortness of breath, wheezing or cough. 18 g 1    albuterol (PROVENTIL) (2.5 MG/3ML) 0.083% neb solution Take 1  vial (2.5 mg) by nebulization every 6 hours as needed for shortness of breath, wheezing or cough. 90 mL 1    citalopram (CELEXA) 10 MG tablet Take 1 tablet (10 mg) by mouth daily (Patient taking differently: Take 10 mg by mouth as needed (Used mostly postpartum.).) 90 tablet 1    FARXIGA 10 MG TABS tablet Take 10 mg by mouth daily. Not taking consistently.      fluconazole (DIFLUCAN) 150 MG tablet Take 150 mg by mouth daily.      fluticasone (FLOVENT HFA) 110 MCG/ACT inhaler Inhale 1-2 puffs into the lungs 2 times daily When you have upper respiratory infections 12 g 1    hydrOXYzine (ATARAX) 25 MG tablet Take 1 tablet (25 mg) by mouth every 6 hours as needed for anxiety 30 tablet 1    losartan (COZAAR) 100 MG tablet Take 100 mg by mouth Currently taking 50mg 11/02/23, has not started 100mg yet      metroNIDAZOLE (METROGEL) 0.75 % vaginal gel Place 1 applicator (5 g) vaginally daily (Patient taking differently: Place 1 applicator vaginally as needed (prn for infections).) 70 g 3    ondansetron (ZOFRAN ODT) 4 MG ODT tab Take 1 tablet (4 mg) by mouth every 8 hours as needed for nausea. 10 tablet 0    predniSONE (DELTASONE) 10 MG tablet Take 10 mg by mouth daily.      predniSONE (DELTASONE) 5 MG tablet Take 5 mg by mouth daily.      SIMPONI 50 MG/0.5ML auto-injector pen Inject 50 mg subcutaneously every 28 days.      SYNTHROID 125 MCG tablet Take 1 tablet (125 mcg) by mouth daily 90 tablet 1    amoxicillin-clavulanate (AUGMENTIN) 875-125 MG tablet Take 1 tablet by mouth 2 times daily. (Patient not taking: Reported on 11/15/2024) 40 tablet 0    azithromycin (ZITHROMAX) 250 MG tablet take 2 tabs day 1, then 1 tab orally daily for 4 days (Patient not taking: Reported on 11/15/2024) 6 tablet 0    norethindrone (AYGESTIN) 5 MG tablet Take 5 mg by mouth daily. (Patient not taking: Reported on 11/15/2024)      Semaglutide-Weight Management (WEGOVY) 0.25 MG/0.5ML pen Inject 0.25 mg subcutaneously once a week. (Patient not  "taking: Reported on 11/15/2024) 2 mL 0     Current Facility-Administered Medications   Medication Dose Route Frequency Provider Last Rate Last Admin    betamethasone acet & sod phos (CELESTONE) injection 6 mg  6 mg   Jomar Silver MD   6 mg at 07/18/19 1010    ropivacaine (NAROPIN) injection 1 mL  1 mL   Jomar Silver MD   1 mL at 07/18/19 1010        PHYSICAL EXAMINATION  VITALS: BP (!) 139/98 (BP Location: Right arm, Patient Position: Sitting)   Pulse 75   Ht 1.702 m (5' 7\")   Wt 84.8 kg (187 lb)   BMI 29.29 kg/m    GENERAL: Healthy appearing, alert, no acute distress, normal habitus.  CARDIOVASCULAR: Extremities warm and well perfused. Pulses present.   NEUROLOGICAL:  Patient is awake and oriented to self, place and time.  Attention span is normal.  Memory is grossly intact.  Language is fluent and follows commands appropriately.  Appropriate fund of knowledge. Cranial nerves 2-12 are intact. There is no pronator drift.  Motor exam shows 5/5 strength in all extremities.  Tone is symmetric bilaterally in upper and lower extremities.  Reflexes are symmetric and 2+ in upper extremities and lower extremities. Sensory exam is grossly intact to light touch, pin prick and vibration.  Finger to nose and heel to shin is without dysmetria.  Romberg is negative.  Gait is normal and the patient is able to do tandem walk and walk on toes and heels.      DIAGNOSTICS  MRI images reviewed.  IMPRESSION:     1.  Unremarkable brain MRI.    RELEVANT LABS  Component      Latest Ref Rng 9/30/2024  12:16 PM   WBC      4.0 - 11.0 10e3/uL 5.9    RBC Count      3.80 - 5.20 10e6/uL 4.26    Hemoglobin      11.7 - 15.7 g/dL 13.6    Hematocrit      35.0 - 47.0 % 39.8    MCV      78 - 100 fL 93    MCH      26.5 - 33.0 pg 31.9    MCHC      31.5 - 36.5 g/dL 34.2    RDW      10.0 - 15.0 % 13.0    Platelet Count      150 - 450 10e3/uL 262    % Neutrophils      % 52    % Lymphocytes      % 34    % Monocytes      % 7    % " Eosinophils      % 5    % Basophils      % 2    % Immature Granulocytes      % 0    NRBCs per 100 WBC      <1 /100 0    Absolute Neutrophils      1.6 - 8.3 10e3/uL 3.1    Absolute Lymphocytes      0.8 - 5.3 10e3/uL 2.0    Absolute Monocytes      0.0 - 1.3 10e3/uL 0.4    Absolute Eosinophils      0.0 - 0.7 10e3/uL 0.3    Absolute Basophils      0.0 - 0.2 10e3/uL 0.1    Absolute Immature Granulocytes      <=0.4 10e3/uL 0.0    Absolute NRBCs      10e3/uL 0.0    Sodium      135 - 145 mmol/L 137    Potassium      3.4 - 5.3 mmol/L 3.9    Chloride      98 - 107 mmol/L 103    Carbon Dioxide (CO2)      22 - 29 mmol/L 26    Anion Gap      7 - 15 mmol/L 8    Urea Nitrogen      6.0 - 20.0 mg/dL 9.0    Creatinine      0.51 - 0.95 mg/dL 0.99 (H)    GFR Estimate      >60 mL/min/1.73m2 77    Calcium      8.8 - 10.4 mg/dL 9.1    Glucose      70 - 99 mg/dL 97    TSH      0.30 - 4.20 uIU/mL 0.64       Legend:  (H) High    OUTSIDE RECORDS  Outside referral notes and chart notes were reviewed and pertinent information has been summarized (in addition to the HPI):-        IMPRESSION/REPORT/PLAN  Urine retention  Numbness and tingling of both upper extremities    This is a 34 year old female with bilateral hand uncomfortable sensation/numbness and subjective complaints of weakness.  Exam today is noncontributory.  Reflexes are intact.  MRI brain has been negative for structural lesions.  This could be related to cervical problem given bilateral hand involvement or could be more of an entrapment neuropathy.  To further evaluate we will check an MRI of the C-spine and EMG of the upper extremities.  Will check blood work to look for medical causes of myelopathy.    In terms of her bladder issues this is suggestive of urinary retention.  Urology evaluation has not shown a cause for her symptoms.  Will check an MRI L-spine/C-spine to further evaluate.  This could be nonneurological in nature.  Would most likely not be related to the hand  issue.    She further complains of lightheadedness/unsteadiness though no vertigo with movement of her neck.  Will see if MRI C-spine shows an etiology.  Could consider CT angiogram and vestibular rehab.    I can see her back in 2 to 3 months after testing.    -     MR Lumbar Spine w/o Contrast; Future  -     MR Cervical Spine w/o Contrast; Future  -     EMG; Future  -     Vitamin B12; Future  -     Vitamin B1 whole blood; Future  -     Protein Immunofixation Serum; Future  -     Protein electrophoresis; Future  -     Ceruloplasmin; Future  -     Anti Nuclear Janet IgG by IFA with Reflex; Future  -     Copper level; Future    Return in about 2 months (around 1/15/2025) for In-Clinic Visit (must), Add on PAOR, After testing.    Over 60 minutes were spent coordinating the care for the patient on the day of the encounter.  This includes previsit, during visit and post visit activities as documented above.  Counseling patient.  Reviewing chart.  Multiple problems reviewed/addressed.  (Activities include but not inclusive of reviewing chart, reviewing outside records, reviewing labs and imaging study results as well as the images, patient visit time including getting history and exam,  use if applicable, review of test results with the patient and coming up with a plan in a shared model, counseling patient and family, education and answering patient questions, EMR , EMR diagnosis entry and problem list management, medication reconciliation and prescription management if applicable, paperwork if applicable, printing documents and documentation of the visit activities.)        Iván Covarrubias MD  Neurologist  [facility]  Tel:- [phone]    This note was dictated using voice recognition software.  Any grammatical or context distortions are unintentional and inherent to the software.      The longitudinal plan of care for the diagnosis(es)/condition(s) as documented  were addressed during this visit. Due to the added complexity in care, I will continue to support Wilma in the subsequent management and with ongoing continuity of care.

## 2024-11-15 NOTE — LETTER
"11/15/2024      Wilma Palacios  [address] [address] [place] [id number]      Dear Colleague,    Thank you for referring your patient, Wilma Palacios, to the [facility] [place]. Please see a copy of my visit note below.    NEUROLOGY OUTPATIENT CONSULT NOTE   Nov 15, 2024     CHIEF COMPLAINT/REASON FOR VISIT/REASON FOR CONSULT  Patient presents with:  Numbness: Numbness and tingling if both upper extremities. Patient states symptoms started in the hands and traveled up to her mid arm. \"It feels like a tingling more than a numbness, like when on a roller coaster and you go down fast.\" Very lightheadedness and uncontrolled bladder frequency. It has been going on since February.     REASON FOR CONSULTATION-lightheadedness, bilateral hand numbness, bladder issues    REFERRAL SOURCE  Dr. Lea Helton*  CC Dr. Lea Helton*    HISTORY OF PRESENT ILLNESS  Wilma Palacios is a 34 year old female seen today for multiple issues.  Symptoms started about a year ago.  There was no clear provoking factor.  In  she had a  and had an epidural for that.  She reports that it was a difficult epidural though symptoms did not start right at that time.    In terms of her hand numbness all 5 fingers in both hands are involved.  Symptoms came on at the same time on both sides.  They eventually spread to the mid forearm.  Describes it more as an uncomfortable sensation.  She is calling it numbness though it might not be true numbness.  Has difficulty with opening jars along with small objects.  No difficulty with heavy objects.  Denies any shooting pain down the arm.  No symptoms in the legs.  No major neck pain.    She further complains of bladder issues.  Has seen the urologist and no clear cause has been identified.  She describes having a sensation of something pushing on her bladder all the time and her having to go to the bathroom though she does not have to pee all the time.  No " incontinence.  Has seen gynecology and they have identified endometriosis which is not suspected to cause her symptoms.    She further complains of lightheadedness/unsteadiness coming from her neck.  She describes it just mainly in the back of her head and this comes on with turning the head at times.    Previous history is reviewed and this is unchanged.    PAST MEDICAL/SURGICAL HISTORY  Past Medical History:   Diagnosis Date     Ankylosing spondylitis with multisystem involvement (H) 2012     Anxiety      Rheumatoid arthritis (H) 2020     Thyroid disease 2008     Patient Active Problem List   Diagnosis     Ankylosing spondylitis of multiple sites in spine (H)     Allergic rhinitis     Anxiety     Chronic chest wall pain     HLA-B27 spondyloarthropathy     Hypothyroidism     Rheumatoid arthritis (H)     Cervical cancer screening     Clinical diagnosis of COVID-19     Subchorionic hemorrhage in first trimester     Drug-induced immunodeficiency (H)     Endometriosis   Significant bleeding tendency, arthritis, endometriosis, ankylosing spondylitis, kidney disease    FAMILY HISTORY  Family History   Problem Relation Age of Onset     Skin Cancer Maternal Grandfather      Rheumatoid Arthritis Maternal Grandfather      Heart Disease Paternal Grandmother    Positive for multiple sclerosis in her aunt.    SOCIAL HISTORY  Social History     Tobacco Use     Smoking status: Former     Current packs/day: 0.00     Types: Cigarettes     Quit date: 2013     Years since quittin.8     Smokeless tobacco: Never   Vaping Use     Vaping status: Never Used   Substance Use Topics     Alcohol use: Not Currently     Comment: rarely     Drug use: No       SYSTEMS REVIEW  Twelve-system ROS was done and other than the HPI this was negative/positive for back pain, joint pain, numbness/tingling, weakness/presses, bladder symptoms, dizziness, anxiety, palpitations.    MEDICATIONS  Current Outpatient Medications   Medication Sig  Dispense Refill     albuterol (PROAIR HFA/PROVENTIL HFA/VENTOLIN HFA) 108 (90 Base) MCG/ACT inhaler Inhale 2 puffs into the lungs every 6 hours as needed for shortness of breath, wheezing or cough. 18 g 1     albuterol (PROVENTIL) (2.5 MG/3ML) 0.083% neb solution Take 1 vial (2.5 mg) by nebulization every 6 hours as needed for shortness of breath, wheezing or cough. 90 mL 1     citalopram (CELEXA) 10 MG tablet Take 1 tablet (10 mg) by mouth daily (Patient taking differently: Take 10 mg by mouth as needed (Used mostly postpartum.).) 90 tablet 1     FARXIGA 10 MG TABS tablet Take 10 mg by mouth daily. Not taking consistently.       fluconazole (DIFLUCAN) 150 MG tablet Take 150 mg by mouth daily.       fluticasone (FLOVENT HFA) 110 MCG/ACT inhaler Inhale 1-2 puffs into the lungs 2 times daily When you have upper respiratory infections 12 g 1     hydrOXYzine (ATARAX) 25 MG tablet Take 1 tablet (25 mg) by mouth every 6 hours as needed for anxiety 30 tablet 1     losartan (COZAAR) 100 MG tablet Take 100 mg by mouth Currently taking 50mg 11/02/23, has not started 100mg yet       metroNIDAZOLE (METROGEL) 0.75 % vaginal gel Place 1 applicator (5 g) vaginally daily (Patient taking differently: Place 1 applicator vaginally as needed (prn for infections).) 70 g 3     ondansetron (ZOFRAN ODT) 4 MG ODT tab Take 1 tablet (4 mg) by mouth every 8 hours as needed for nausea. 10 tablet 0     predniSONE (DELTASONE) 10 MG tablet Take 10 mg by mouth daily.       predniSONE (DELTASONE) 5 MG tablet Take 5 mg by mouth daily.       SIMPONI 50 MG/0.5ML auto-injector pen Inject 50 mg subcutaneously every 28 days.       SYNTHROID 125 MCG tablet Take 1 tablet (125 mcg) by mouth daily 90 tablet 1     amoxicillin-clavulanate (AUGMENTIN) 875-125 MG tablet Take 1 tablet by mouth 2 times daily. (Patient not taking: Reported on 11/15/2024) 40 tablet 0     azithromycin (ZITHROMAX) 250 MG tablet take 2 tabs day 1, then 1 tab orally daily for 4 days  "(Patient not taking: Reported on 11/15/2024) 6 tablet 0     norethindrone (AYGESTIN) 5 MG tablet Take 5 mg by mouth daily. (Patient not taking: Reported on 11/15/2024)       Semaglutide-Weight Management (WEGOVY) 0.25 MG/0.5ML pen Inject 0.25 mg subcutaneously once a week. (Patient not taking: Reported on 11/15/2024) 2 mL 0     Current Facility-Administered Medications   Medication Dose Route Frequency Provider Last Rate Last Admin     betamethasone acet & sod phos (CELESTONE) injection 6 mg  6 mg   Jomar Silver MD   6 mg at 07/18/19 1010     ropivacaine (NAROPIN) injection 1 mL  1 mL   Jomar Silver MD   1 mL at 07/18/19 1010        PHYSICAL EXAMINATION  VITALS: BP (!) 139/98 (BP Location: Right arm, Patient Position: Sitting)   Pulse 75   Ht 1.702 m (5' 7\")   Wt 84.8 kg (187 lb)   BMI 29.29 kg/m    GENERAL: Healthy appearing, alert, no acute distress, normal habitus.  CARDIOVASCULAR: Extremities warm and well perfused. Pulses present.   NEUROLOGICAL:  Patient is awake and oriented to self, place and time.  Attention span is normal.  Memory is grossly intact.  Language is fluent and follows commands appropriately.  Appropriate fund of knowledge. Cranial nerves 2-12 are intact. There is no pronator drift.  Motor exam shows 5/5 strength in all extremities.  Tone is symmetric bilaterally in upper and lower extremities.  Reflexes are symmetric and 2+ in upper extremities and lower extremities. Sensory exam is grossly intact to light touch, pin prick and vibration.  Finger to nose and heel to shin is without dysmetria.  Romberg is negative.  Gait is normal and the patient is able to do tandem walk and walk on toes and heels.      DIAGNOSTICS  MRI images reviewed.  IMPRESSION:     1.  Unremarkable brain MRI.    RELEVANT LABS  Component      Latest Ref Rng 9/30/2024  12:16 PM   WBC      4.0 - 11.0 10e3/uL 5.9    RBC Count      3.80 - 5.20 10e6/uL 4.26    Hemoglobin      11.7 - 15.7 g/dL 13.6  "   Hematocrit      35.0 - 47.0 % 39.8    MCV      78 - 100 fL 93    MCH      26.5 - 33.0 pg 31.9    MCHC      31.5 - 36.5 g/dL 34.2    RDW      10.0 - 15.0 % 13.0    Platelet Count      150 - 450 10e3/uL 262    % Neutrophils      % 52    % Lymphocytes      % 34    % Monocytes      % 7    % Eosinophils      % 5    % Basophils      % 2    % Immature Granulocytes      % 0    NRBCs per 100 WBC      <1 /100 0    Absolute Neutrophils      1.6 - 8.3 10e3/uL 3.1    Absolute Lymphocytes      0.8 - 5.3 10e3/uL 2.0    Absolute Monocytes      0.0 - 1.3 10e3/uL 0.4    Absolute Eosinophils      0.0 - 0.7 10e3/uL 0.3    Absolute Basophils      0.0 - 0.2 10e3/uL 0.1    Absolute Immature Granulocytes      <=0.4 10e3/uL 0.0    Absolute NRBCs      10e3/uL 0.0    Sodium      135 - 145 mmol/L 137    Potassium      3.4 - 5.3 mmol/L 3.9    Chloride      98 - 107 mmol/L 103    Carbon Dioxide (CO2)      22 - 29 mmol/L 26    Anion Gap      7 - 15 mmol/L 8    Urea Nitrogen      6.0 - 20.0 mg/dL 9.0    Creatinine      0.51 - 0.95 mg/dL 0.99 (H)    GFR Estimate      >60 mL/min/1.73m2 77    Calcium      8.8 - 10.4 mg/dL 9.1    Glucose      70 - 99 mg/dL 97    TSH      0.30 - 4.20 uIU/mL 0.64       Legend:  (H) High    OUTSIDE RECORDS  Outside referral notes and chart notes were reviewed and pertinent information has been summarized (in addition to the HPI):-        IMPRESSION/REPORT/PLAN  Urine retention  Numbness and tingling of both upper extremities    This is a 34 year old female with bilateral hand uncomfortable sensation/numbness and subjective complaints of weakness.  Exam today is noncontributory.  Reflexes are intact.  MRI brain has been negative for structural lesions.  This could be related to cervical problem given bilateral hand involvement or could be more of an entrapment neuropathy.  To further evaluate we will check an MRI of the C-spine and EMG of the upper extremities.  Will check blood work to look for medical causes of  myelopathy.    In terms of her bladder issues this is suggestive of urinary retention.  Urology evaluation has not shown a cause for her symptoms.  Will check an MRI L-spine/C-spine to further evaluate.  This could be nonneurological in nature.  Would most likely not be related to the hand issue.    She further complains of lightheadedness/unsteadiness though no vertigo with movement of her neck.  Will see if MRI C-spine shows an etiology.  Could consider CT angiogram and vestibular rehab.    I can see her back in 2 to 3 months after testing.    -     MR Lumbar Spine w/o Contrast; Future  -     MR Cervical Spine w/o Contrast; Future  -     EMG; Future  -     Vitamin B12; Future  -     Vitamin B1 whole blood; Future  -     Protein Immunofixation Serum; Future  -     Protein electrophoresis; Future  -     Ceruloplasmin; Future  -     Anti Nuclear Janet IgG by IFA with Reflex; Future  -     Copper level; Future    Return in about 2 months (around 1/15/2025) for In-Clinic Visit (must), Add on PAOR, After testing.    Over 60 minutes were spent coordinating the care for the patient on the day of the encounter.  This includes previsit, during visit and post visit activities as documented above.  Counseling patient.  Reviewing chart.  Multiple problems reviewed/addressed.  (Activities include but not inclusive of reviewing chart, reviewing outside records, reviewing labs and imaging study results as well as the images, patient visit time including getting history and exam,  use if applicable, review of test results with the patient and coming up with a plan in a shared model, counseling patient and family, education and answering patient questions, EMR , EMR diagnosis entry and problem list management, medication reconciliation and prescription management if applicable, paperwork if applicable, printing documents and documentation of the visit activities.)        Iván Covarrubias MD  Neurologist  [facility]  [facility]  Tel:- [phone]    This note was dictated using voice recognition software.  Any grammatical or context distortions are unintentional and inherent to the software.      The longitudinal plan of care for the diagnosis(es)/condition(s) as documented were addressed during this visit. Due to the added complexity in care, I will continue to support Wilma in the subsequent management and with ongoing continuity of care.      Again, thank you for allowing me to participate in the care of your patient.        Sincerely,        Iván Covarrubias MD

## 2024-11-15 NOTE — NURSING NOTE
"Chief Complaint   Patient presents with    Numbness     Numbness and tingling if both upper extremities. Patient states symptoms started in the hands and traveled up to her mid arm. \"It feels like a tingling more than a numbness, like when on a roller coaster and you go down fast.\" Very lightheadedness and uncontrolled bladder frequency. It has been going on since February.      Radha Bowden MA    "

## 2024-11-19 DIAGNOSIS — E66.01 MORBID OBESITY (H): ICD-10-CM

## 2024-11-19 DIAGNOSIS — M47.899 HLA-B27 SPONDYLOARTHROPATHY: ICD-10-CM

## 2024-11-19 DIAGNOSIS — M06.9 RHEUMATOID ARTHRITIS, INVOLVING UNSPECIFIED SITE, UNSPECIFIED WHETHER RHEUMATOID FACTOR PRESENT (H): ICD-10-CM

## 2024-11-21 RX ORDER — SEMAGLUTIDE 0.25 MG/.5ML
INJECTION, SOLUTION SUBCUTANEOUS
Qty: 2 ML | Refills: 0 | OUTPATIENT
Start: 2024-11-21

## 2024-11-25 ENCOUNTER — HOSPITAL ENCOUNTER (OUTPATIENT)
Dept: MRI IMAGING | Facility: CLINIC | Age: 34
Discharge: HOME | End: 2024-11-25
Attending: PSYCHIATRY & NEUROLOGY | Admitting: PSYCHIATRY & NEUROLOGY
Payer: COMMERCIAL

## 2024-11-25 ENCOUNTER — LAB (OUTPATIENT)
Dept: LAB | Facility: CLINIC | Age: 34
End: 2024-11-25
Payer: COMMERCIAL

## 2024-11-25 DIAGNOSIS — R20.0 NUMBNESS AND TINGLING OF BOTH UPPER EXTREMITIES: ICD-10-CM

## 2024-11-25 DIAGNOSIS — R33.9 URINE RETENTION: ICD-10-CM

## 2024-11-25 DIAGNOSIS — R20.2 NUMBNESS AND TINGLING OF BOTH UPPER EXTREMITIES: ICD-10-CM

## 2024-11-25 DIAGNOSIS — E55.9 VITAMIN D DEFICIENCY: ICD-10-CM

## 2024-11-25 LAB — TOTAL PROTEIN SERUM FOR ELP: 6.9 G/DL (ref 6.4–8.3)

## 2024-11-25 PROCEDURE — 86039 ANTINUCLEAR ANTIBODIES (ANA): CPT

## 2024-11-25 PROCEDURE — 84155 ASSAY OF PROTEIN SERUM: CPT

## 2024-11-25 PROCEDURE — 72148 MRI LUMBAR SPINE W/O DYE: CPT

## 2024-11-25 PROCEDURE — 82607 VITAMIN B-12: CPT

## 2024-11-25 PROCEDURE — 82525 ASSAY OF COPPER: CPT | Mod: 90

## 2024-11-25 PROCEDURE — 84165 PROTEIN E-PHORESIS SERUM: CPT | Performed by: PATHOLOGY

## 2024-11-25 PROCEDURE — 82306 VITAMIN D 25 HYDROXY: CPT

## 2024-11-25 PROCEDURE — 36415 COLL VENOUS BLD VENIPUNCTURE: CPT

## 2024-11-25 PROCEDURE — 84425 ASSAY OF VITAMIN B-1: CPT | Mod: 90

## 2024-11-25 PROCEDURE — 72141 MRI NECK SPINE W/O DYE: CPT

## 2024-11-25 PROCEDURE — 86038 ANTINUCLEAR ANTIBODIES: CPT

## 2024-11-25 PROCEDURE — 99000 SPECIMEN HANDLING OFFICE-LAB: CPT

## 2024-11-25 PROCEDURE — 86334 IMMUNOFIX E-PHORESIS SERUM: CPT | Performed by: PATHOLOGY

## 2024-11-26 LAB
ALBUMIN SERPL ELPH-MCNC: 4.1 G/DL (ref 3.7–5.1)
ALPHA1 GLOB SERPL ELPH-MCNC: 0.3 G/DL (ref 0.2–0.4)
ALPHA2 GLOB SERPL ELPH-MCNC: 0.7 G/DL (ref 0.5–0.9)
ANA PAT SER IF-IMP: ABNORMAL
ANA SER QL IF: POSITIVE
ANA TITR SER IF: ABNORMAL {TITER}
B-GLOBULIN SERPL ELPH-MCNC: 0.9 G/DL (ref 0.6–1)
GAMMA GLOB SERPL ELPH-MCNC: 0.9 G/DL (ref 0.7–1.6)
M PROTEIN SERPL ELPH-MCNC: 0 G/DL
PROT PATTERN SERPL ELPH-IMP: NORMAL
PROT PATTERN SERPL IFE-IMP: NORMAL
VIT B12 SERPL-MCNC: 693 PG/ML (ref 232–1245)
VIT D+METAB SERPL-MCNC: 26 NG/ML (ref 20–50)

## 2024-12-02 LAB — CERULOPLASMIN SERPL-MCNC: 38 MG/DL (ref 20–60)

## 2024-12-18 ENCOUNTER — TELEPHONE (OUTPATIENT)
Dept: FAMILY MEDICINE | Facility: CLINIC | Age: 34
End: 2024-12-18
Payer: COMMERCIAL

## 2024-12-18 NOTE — TELEPHONE ENCOUNTER
Pt calling looking to see if the [facility] sent her lab orders over to [facility]?    Nothing in chart yet.    Pls call patient to confirm lab orders have been received.

## 2024-12-18 NOTE — TELEPHONE ENCOUNTER
Received a letter from Dr Kassy Duffy from the [facility] requesting pt get a standard walking boot for her left foot pain due to her having Ankylosing Spondylitis.    Placed letter on providers desk.

## 2024-12-18 NOTE — TELEPHONE ENCOUNTER
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

## 2024-12-18 NOTE — TELEPHONE ENCOUNTER
Order/Referral Request    Who is requesting: Pt     Orders being requested: She would like a boot for her foot     Reason service is needed/diagnosis: Foot pain / has auto immune disorder and her foot is painful for the past 2 weeks  to walk on . Left foot     When are orders needed by: asap     Has this been discussed with Provider: Yes    Does patient have a preference on a Group/Provider/Facility? [facility]     Does patient have an appointment scheduled?: No    Where to send orders:  [facility]     Could we send this information to you in [employer] or would you prefer to receive a phone call?:   No preference   Okay to leave a detailed message?: Yes at Cell number on file:    Telephone Information:   Mobile [phone]

## 2024-12-19 DIAGNOSIS — M45.0 ANKYLOSING SPONDYLITIS OF MULTIPLE SITES IN SPINE (H): Primary | ICD-10-CM

## 2024-12-19 DIAGNOSIS — M79.672 LEFT FOOT PAIN: ICD-10-CM

## 2024-12-19 NOTE — TELEPHONE ENCOUNTER
Pt states order doesn't need to be signed by PCP, just faxed to [facility]. Order faxed.    Maggy Palomino, HARRISON Perez

## 2024-12-27 ENCOUNTER — MYC MEDICAL ADVICE (OUTPATIENT)
Dept: FAMILY MEDICINE | Facility: CLINIC | Age: 34
End: 2024-12-27
Payer: COMMERCIAL

## 2024-12-27 DIAGNOSIS — M47.899 HLA-B27 SPONDYLOARTHROPATHY: ICD-10-CM

## 2024-12-27 DIAGNOSIS — E66.01 MORBID OBESITY (H): ICD-10-CM

## 2024-12-27 DIAGNOSIS — M06.9 RHEUMATOID ARTHRITIS, INVOLVING UNSPECIFIED SITE, UNSPECIFIED WHETHER RHEUMATOID FACTOR PRESENT (H): ICD-10-CM

## 2024-12-30 ENCOUNTER — LAB (OUTPATIENT)
Dept: LAB | Facility: CLINIC | Age: 34
End: 2024-12-30
Payer: COMMERCIAL

## 2024-12-30 DIAGNOSIS — M45.0 ANKYLOSING SPONDYLITIS OF MULTIPLE SITES IN SPINE (H): ICD-10-CM

## 2024-12-30 DIAGNOSIS — M79.672 LEFT FOOT PAIN: ICD-10-CM

## 2024-12-30 LAB
ALBUMIN UR-MCNC: NEGATIVE MG/DL
APPEARANCE UR: CLEAR
AST SERPL W P-5'-P-CCNC: 13 U/L (ref 0–45)
BASOPHILS # BLD AUTO: 0 10E3/UL (ref 0–0.2)
BASOPHILS NFR BLD AUTO: 0 %
BILIRUB UR QL STRIP: NEGATIVE
COLOR UR AUTO: YELLOW
CREAT SERPL-MCNC: 0.96 MG/DL (ref 0.51–0.95)
CRP SERPL-MCNC: <3 MG/L
EGFRCR SERPLBLD CKD-EPI 2021: 80 ML/MIN/1.73M2
EOSINOPHIL # BLD AUTO: 0.2 10E3/UL (ref 0–0.7)
EOSINOPHIL NFR BLD AUTO: 3 %
ERYTHROCYTE [DISTWIDTH] IN BLOOD BY AUTOMATED COUNT: 12.9 % (ref 10–15)
ERYTHROCYTE [SEDIMENTATION RATE] IN BLOOD BY WESTERGREN METHOD: 9 MM/HR (ref 0–20)
GLUCOSE UR STRIP-MCNC: NEGATIVE MG/DL
HCT VFR BLD AUTO: 39 % (ref 35–47)
HGB BLD-MCNC: 13.2 G/DL (ref 11.7–15.7)
HGB UR QL STRIP: ABNORMAL
IMM GRANULOCYTES # BLD: 0 10E3/UL
IMM GRANULOCYTES NFR BLD: 0 %
KETONES UR STRIP-MCNC: NEGATIVE MG/DL
LEUKOCYTE ESTERASE UR QL STRIP: NEGATIVE
LYMPHOCYTES # BLD AUTO: 2 10E3/UL (ref 0.8–5.3)
LYMPHOCYTES NFR BLD AUTO: 32 %
MCH RBC QN AUTO: 31.1 PG (ref 26.5–33)
MCHC RBC AUTO-ENTMCNC: 33.8 G/DL (ref 31.5–36.5)
MCV RBC AUTO: 92 FL (ref 78–100)
MONOCYTES # BLD AUTO: 0.4 10E3/UL (ref 0–1.3)
MONOCYTES NFR BLD AUTO: 6 %
NEUTROPHILS # BLD AUTO: 3.6 10E3/UL (ref 1.6–8.3)
NEUTROPHILS NFR BLD AUTO: 59 %
NITRATE UR QL: NEGATIVE
PH UR STRIP: 5.5 [PH] (ref 5–7)
PLATELET # BLD AUTO: 288 10E3/UL (ref 150–450)
RBC # BLD AUTO: 4.24 10E6/UL (ref 3.8–5.2)
RBC #/AREA URNS AUTO: ABNORMAL /HPF
SP GR UR STRIP: 1.01 (ref 1–1.03)
SQUAMOUS #/AREA URNS AUTO: ABNORMAL /LPF
UROBILINOGEN UR STRIP-ACNC: 0.2 E.U./DL
WBC # BLD AUTO: 6.1 10E3/UL (ref 4–11)
WBC #/AREA URNS AUTO: ABNORMAL /HPF

## 2024-12-30 PROCEDURE — 81001 URINALYSIS AUTO W/SCOPE: CPT

## 2024-12-30 PROCEDURE — 84450 TRANSFERASE (AST) (SGOT): CPT

## 2024-12-30 PROCEDURE — 85025 COMPLETE CBC W/AUTO DIFF WBC: CPT

## 2024-12-30 PROCEDURE — 85652 RBC SED RATE AUTOMATED: CPT

## 2024-12-30 PROCEDURE — 86140 C-REACTIVE PROTEIN: CPT

## 2024-12-30 PROCEDURE — 36415 COLL VENOUS BLD VENIPUNCTURE: CPT

## 2024-12-30 PROCEDURE — 82565 ASSAY OF CREATININE: CPT

## 2025-01-20 ENCOUNTER — MYC MEDICAL ADVICE (OUTPATIENT)
Dept: FAMILY MEDICINE | Facility: CLINIC | Age: 35
End: 2025-01-20
Payer: COMMERCIAL

## 2025-01-20 DIAGNOSIS — E66.01 MORBID OBESITY (H): ICD-10-CM

## 2025-01-20 DIAGNOSIS — M47.899 HLA-B27 SPONDYLOARTHROPATHY: ICD-10-CM

## 2025-01-20 DIAGNOSIS — E03.9 HYPOTHYROIDISM, UNSPECIFIED TYPE: ICD-10-CM

## 2025-01-20 DIAGNOSIS — M06.9 RHEUMATOID ARTHRITIS, INVOLVING UNSPECIFIED SITE, UNSPECIFIED WHETHER RHEUMATOID FACTOR PRESENT (H): ICD-10-CM

## 2025-01-21 RX ORDER — LEVOTHYROXINE SODIUM 125 MCG
125 TABLET ORAL DAILY
Qty: 90 TABLET | Refills: 2 | Status: SHIPPED | OUTPATIENT
Start: 2025-01-21

## 2025-01-28 ENCOUNTER — MYC MEDICAL ADVICE (OUTPATIENT)
Dept: NEUROLOGY | Facility: CLINIC | Age: 35
End: 2025-01-28
Payer: COMMERCIAL

## 2025-01-28 DIAGNOSIS — R76.8 FALSE POSITIVE ANA: Primary | ICD-10-CM

## 2025-01-29 NOTE — TELEPHONE ENCOUNTER
The tingling sensation would not be from the lupus.  We need to do the EMG to evaluate for neurological problems.  I can set you up with a lupus specialist-rheumatologist- if you are interested in further exploring this.  I will put in the referral.  You could also start with a primary care doctor to further explore possibility of lupus.

## 2025-02-06 ENCOUNTER — OFFICE VISIT (OUTPATIENT)
Dept: FAMILY MEDICINE | Facility: CLINIC | Age: 35
End: 2025-02-06
Payer: COMMERCIAL

## 2025-02-06 ENCOUNTER — TELEPHONE (OUTPATIENT)
Dept: PEDIATRICS | Facility: CLINIC | Age: 35
End: 2025-02-06

## 2025-02-06 ENCOUNTER — ANCILLARY PROCEDURE (OUTPATIENT)
Dept: GENERAL RADIOLOGY | Facility: CLINIC | Age: 35
End: 2025-02-06
Attending: NURSE PRACTITIONER
Payer: COMMERCIAL

## 2025-02-06 VITALS
DIASTOLIC BLOOD PRESSURE: 84 MMHG | WEIGHT: 188 LBS | SYSTOLIC BLOOD PRESSURE: 106 MMHG | RESPIRATION RATE: 16 BRPM | HEIGHT: 67 IN | HEART RATE: 78 BPM | OXYGEN SATURATION: 100 % | TEMPERATURE: 98.2 F | BODY MASS INDEX: 29.51 KG/M2

## 2025-02-06 DIAGNOSIS — M79.672 BILATERAL FOOT PAIN: ICD-10-CM

## 2025-02-06 DIAGNOSIS — M79.672 BILATERAL FOOT PAIN: Primary | ICD-10-CM

## 2025-02-06 DIAGNOSIS — M79.671 BILATERAL FOOT PAIN: Primary | ICD-10-CM

## 2025-02-06 DIAGNOSIS — M79.671 BILATERAL FOOT PAIN: ICD-10-CM

## 2025-02-06 DIAGNOSIS — N18.31 STAGE 3A CHRONIC KIDNEY DISEASE (H): ICD-10-CM

## 2025-02-06 DIAGNOSIS — R76.8 POSITIVE ANA (ANTINUCLEAR ANTIBODY): ICD-10-CM

## 2025-02-06 DIAGNOSIS — M45.9 ANKYLOSING SPONDYLITIS, UNSPECIFIED SITE OF SPINE (H): ICD-10-CM

## 2025-02-06 LAB
CRP SERPL-MCNC: <3 MG/L
ERYTHROCYTE [SEDIMENTATION RATE] IN BLOOD BY WESTERGREN METHOD: 13 MM/HR (ref 0–20)
URATE SERPL-MCNC: 4.8 MG/DL (ref 2.4–5.7)

## 2025-02-06 RX ORDER — TRAMADOL HYDROCHLORIDE 50 MG/1
50 TABLET ORAL EVERY 6 HOURS PRN
Qty: 12 TABLET | Refills: 0 | Status: SHIPPED | OUTPATIENT
Start: 2025-02-06 | End: 2025-02-09

## 2025-02-06 ASSESSMENT — PATIENT HEALTH QUESTIONNAIRE - PHQ9
SUM OF ALL RESPONSES TO PHQ QUESTIONS 1-9: 2
SUM OF ALL RESPONSES TO PHQ QUESTIONS 1-9: 2
10. IF YOU CHECKED OFF ANY PROBLEMS, HOW DIFFICULT HAVE THESE PROBLEMS MADE IT FOR YOU TO DO YOUR WORK, TAKE CARE OF THINGS AT HOME, OR GET ALONG WITH OTHER PEOPLE: NOT DIFFICULT AT ALL

## 2025-02-06 ASSESSMENT — ASTHMA QUESTIONNAIRES
QUESTION_1 LAST FOUR WEEKS HOW MUCH OF THE TIME DID YOUR ASTHMA KEEP YOU FROM GETTING AS MUCH DONE AT WORK, SCHOOL OR AT HOME: NONE OF THE TIME
ACT_TOTALSCORE: 24
QUESTION_3 LAST FOUR WEEKS HOW OFTEN DID YOUR ASTHMA SYMPTOMS (WHEEZING, COUGHING, SHORTNESS OF BREATH, CHEST TIGHTNESS OR PAIN) WAKE YOU UP AT NIGHT OR EARLIER THAN USUAL IN THE MORNING: NOT AT ALL
QUESTION_4 LAST FOUR WEEKS HOW OFTEN HAVE YOU USED YOUR RESCUE INHALER OR NEBULIZER MEDICATION (SUCH AS ALBUTEROL): NOT AT ALL
ACT_TOTALSCORE: 24
QUESTION_5 LAST FOUR WEEKS HOW WOULD YOU RATE YOUR ASTHMA CONTROL: WELL CONTROLLED
QUESTION_2 LAST FOUR WEEKS HOW OFTEN HAVE YOU HAD SHORTNESS OF BREATH: NOT AT ALL

## 2025-02-06 NOTE — PROGRESS NOTES
"  {PROVIDER CHARTING PREFERENCE:476318}    Ally Dillon is a 34 year old, presenting for the following health issues:  Musculoskeletal Problem        2/6/2025     3:08 PM   Additional Questions   Roomed by Pau ANSARI     Musculoskeletal Problem    History of Present Illness       Reason for visit:  Severe foot pain  Symptom onset:  More than a month  Symptoms include:  Pain on top of foot and side  Symptom intensity:  Severe  Symptom progression:  Worsening  Had these symptoms before:  No  What makes it worse:  Worse when i wake up  What makes it better:  Resting and staying off it She is missing 2 dose(s) of medications per week.       Pain History:  When did you first notice your pain? 2 months    Have you seen anyone else for your pain? No  How has your pain affected your ability to work? Not applicable  Where in your body do you have pain? Musculoskeletal problem/pain  Onset/Duration: 2 months   Description  Location: foot - bilateral  Joint Swelling: No  Redness: No  Pain: YES- feels severely bruised   Warmth: No  Intensity:  severe  Progression of Symptoms:  worsening and constant  Accompanying signs and symptoms:   Fevers: No  Numbness/tingling/weakness: No  History  Trauma to the area: No  Recent illness:  No  Previous similar problem: No  Previous evaluation:  No  Precipitating or alleviating factors:  Aggravating factors include: walking, climbing stairs, and overuse  Therapies tried and outcome: ice, and epsom salt soaks, boot, staying off feet, elevation, tylenol   {Links to Pain Management SmartSet and MNPMP (Optional) :375203}  {additonal problems for provider to add (Optional):914574}    {ROS Picklists (Optional):195869}      Objective    /84   Pulse 78   Temp 98.2  F (36.8  C) (Tympanic)   Resp 16   Ht 1.702 m (5' 7\")   Wt 85.3 kg (188 lb)   SpO2 100%   BMI 29.44 kg/m    Body mass index is 29.44 kg/m .  Physical Exam   {Exam List (Optional):342953}    {Diagnostic Test Results " (Optional):549031}        Signed Electronically by: SALIMA Perales CNP  {Email feedback regarding this note to [email or web address]   :114398}

## 2025-02-06 NOTE — TELEPHONE ENCOUNTER
Reason for Call:  Appointment Request    Patient requesting this type of appt: Chronic Diease Management/Medication/Follow-Up    Requested provider:  any    Reason patient unable to be scheduled: Not within requested timeframe    When does patient want to be seen/preferred time: Same day    Comments: pt's daughter has an appt with Jeni and mom was wondering if there is any possible way to be seen at the same time either with this provider or another provider. Mom is in a lot of pain     Could we send this information to you in [employer] or would you prefer to receive a phone call?:   Patient would prefer a phone call   Okay to leave a detailed message?: Yes at   Cell number on file:    Telephone Information:   Mobile [phone]       Call taken on 2/6/2025 at 11:01 AM by Kasey Bond

## 2025-02-07 PROBLEM — N18.31 STAGE 3A CHRONIC KIDNEY DISEASE (H): Status: ACTIVE | Noted: 2025-02-07

## 2025-02-07 PROBLEM — R76.8 POSITIVE ANA (ANTINUCLEAR ANTIBODY): Status: ACTIVE | Noted: 2025-02-07

## 2025-02-09 ENCOUNTER — MYC MEDICAL ADVICE (OUTPATIENT)
Dept: FAMILY MEDICINE | Facility: CLINIC | Age: 35
End: 2025-02-09
Payer: COMMERCIAL

## 2025-02-09 DIAGNOSIS — M06.9 RHEUMATOID ARTHRITIS, INVOLVING UNSPECIFIED SITE, UNSPECIFIED WHETHER RHEUMATOID FACTOR PRESENT (H): ICD-10-CM

## 2025-02-09 DIAGNOSIS — M47.899 HLA-B27 SPONDYLOARTHROPATHY: ICD-10-CM

## 2025-02-09 DIAGNOSIS — E66.01 MORBID OBESITY (H): ICD-10-CM

## 2025-02-10 ENCOUNTER — OFFICE VISIT (OUTPATIENT)
Dept: PODIATRY | Facility: CLINIC | Age: 35
End: 2025-02-10
Attending: NURSE PRACTITIONER
Payer: COMMERCIAL

## 2025-02-10 VITALS — HEART RATE: 73 BPM | DIASTOLIC BLOOD PRESSURE: 73 MMHG | SYSTOLIC BLOOD PRESSURE: 114 MMHG

## 2025-02-10 DIAGNOSIS — M79.671 BILATERAL FOOT PAIN: ICD-10-CM

## 2025-02-10 DIAGNOSIS — M79.672 BILATERAL FOOT PAIN: ICD-10-CM

## 2025-02-10 DIAGNOSIS — M77.51 CAPSULITIS OF METATARSOPHALANGEAL (MTP) JOINT OF RIGHT FOOT: ICD-10-CM

## 2025-02-10 DIAGNOSIS — M77.52 CAPSULITIS OF METATARSOPHALANGEAL (MTP) JOINT OF LEFT FOOT: Primary | ICD-10-CM

## 2025-02-10 PROCEDURE — 99203 OFFICE O/P NEW LOW 30 MIN: CPT | Performed by: PODIATRIST

## 2025-02-10 NOTE — LETTER
2/10/2025      Wilma Palacios  [address] [address] [place] [id number]      Dear Colleague,    Thank you for referring your patient, Wilma Paalcios, to the [facility] [place]. Please see a copy of my visit note below.    PATIENT HISTORY:  Wilma Palacios is a 34 year old female who presents to clinic in consultation at the request of  Kasey Marquez C.N.P. with a chief complaint of bilateral foot pain  The patient is seen by themselves.  The patient relates the pain is primarily located around the top of metatarsals and ball of foot.  Reports insidious onset without acute precipitating event.  The patient relates that the symptoms have been going on for several month(s).  The patient has previously tried rest, elevation, tylenol and soaking in epsom salt, prednisone with little relief.  The patient is currently employed as  .  Any previous notes and studies that pertain to the patient's condition were reviewed.    Pertinent medical, surgical and family history was reviewed in the Epic chart.    Past Medical History:   Past Medical History:   Diagnosis Date     Ankylosing spondylitis with multisystem involvement (H) 2012     Anxiety      Rheumatoid arthritis (H) 11/19/2020     Thyroid disease 2008       Medications:   Current Outpatient Medications:      albuterol (PROAIR HFA/PROVENTIL HFA/VENTOLIN HFA) 108 (90 Base) MCG/ACT inhaler, Inhale 2 puffs into the lungs every 6 hours as needed for shortness of breath, wheezing or cough., Disp: 18 g, Rfl: 1     albuterol (PROVENTIL) (2.5 MG/3ML) 0.083% neb solution, Take 1 vial (2.5 mg) by nebulization every 6 hours as needed for shortness of breath, wheezing or cough., Disp: 90 mL, Rfl: 1     FARXIGA 10 MG TABS tablet, Take 10 mg by mouth daily. Not taking consistently., Disp: , Rfl:      fluconazole (DIFLUCAN) 150 MG tablet, Take 150 mg by mouth daily., Disp: , Rfl:      fluticasone (FLOVENT HFA) 110 MCG/ACT inhaler, Inhale 1-2  puffs into the lungs 2 times daily When you have upper respiratory infections, Disp: 12 g, Rfl: 1     hydrOXYzine (ATARAX) 25 MG tablet, Take 1 tablet (25 mg) by mouth every 6 hours as needed for anxiety, Disp: 30 tablet, Rfl: 1     metroNIDAZOLE (METROGEL) 0.75 % vaginal gel, Place 1 applicator (5 g) vaginally daily, Disp: 70 g, Rfl: 3     ondansetron (ZOFRAN ODT) 4 MG ODT tab, Take 1 tablet (4 mg) by mouth every 8 hours as needed for nausea., Disp: 10 tablet, Rfl: 0     predniSONE (DELTASONE) 10 MG tablet, Take 10 mg by mouth daily., Disp: , Rfl:      predniSONE (DELTASONE) 5 MG tablet, Take 5 mg by mouth daily., Disp: , Rfl:      Semaglutide-Weight Management (WEGOVY) 1 MG/0.5ML pen, Inject 1 mg subcutaneously once a week., Disp: 2 mL, Rfl: 0     SIMPONI 50 MG/0.5ML auto-injector pen, Inject 50 mg subcutaneously every 28 days., Disp: , Rfl:      SYNTHROID 125 MCG tablet, Take 1 tablet (125 mcg) by mouth daily., Disp: 90 tablet, Rfl: 2     losartan (COZAAR) 100 MG tablet, Take 100 mg by mouth Currently taking 50mg 11/02/23, has not started 100mg yet, Disp: , Rfl:      Allergies:    Allergies   Allergen Reactions     Acetic Acid Anaphylaxis, Itching and Rash     Ascorbate Anaphylaxis     All Fruits.     Food Other (See Comments)     Berries (fruit)  Throat closes, itchy     Lycopene      Other reaction(s): Angioedema     Nuts Swelling and Nausea and Vomiting     Pistachio Nut Extract Anaphylaxis     Sulfa Antibiotics      Other Drug Allergy (See Comments) Hives and Rash     BLEACH       Vitals: /73   Pulse 73   BMI= There is no height or weight on file to calculate BMI.    LOWER EXTREMITY PHYSICAL EXAM    Dermatologic: Skin is intact to right and left lower extremity without significant lesions, rash or abrasion.        Vascular: DP & PT pulses are intact & regular on the right and left.   CFT and skin temperature is normal to the right and left lower extremity.     Neurologic: Lower extremity sensation is  intact to light touch.  No evidence of weakness in the right and left lower extremity.        Musculoskeletal: Patient is ambulatory without assistive device or brace.  No gross ankle deformity noted.  No foot or ankle joint effusion is noted.  Noted pain on palpation under the lesser metatarsophalangeal joints bilaterally.  No surrounding erythema or edema noted.  Noted tight gastroc complex bilaterally.    Diagnostics:  Radiographs included three views of the left and right foot demonstrating   no cortical erosions or periosteal elevation.  All joint margins appear stable.  There is no apparent fracture or tumor formation noted.  There is no evidence of foreign body.  The images were independently reviewed by myself along with the patient explaining the findings.      ASSESSMENT / PLAN:     ICD-10-CM    1. Capsulitis of metatarsophalangeal (MTP) joint of left foot  M77.52 Orthotics, Mastectomy and Custom Compression Orders      2. Bilateral foot pain  M79.671 Orthopedic  Referral    M79.672       3. Capsulitis of metatarsophalangeal (MTP) joint of right foot  M77.51 Orthotics, Mastectomy and Custom Compression Orders          I have explained to Wilma about the conditions.  We discussed the underlying contributing factors to the condition as well as both conservative and surgical treatment options along with expected length of recovery.  At this time, the patient was educated on the importance of offloading supportive shoes and other devices.  I demonstrated to the patient calf stretches to perform every hour daily until symptoms resolve.  After symptoms resolve, the patient was advised to perform the stretches 3 times daily to prevent future recurrence.  The patient was instructed to perform warm soaks with Epson salt after which to also apply over-the-counter Voltaren gel to deeply massage the injured tissue.  The patient was instructed to do this on a daily basis until symptoms resolve.   The patient  was prescribed custom orthotics that will aid in offloading the tension forces to the soft tissues and prevent further inflammation.   The patient may return in four weeks for reevaluation to determine if any further treatment will be needed.      Wilma verbalized agreement with and understanding of the rational for the diagnosis and treatment plan.  All questions were answered to best of my ability and the patient's satisfaction. The patient was advised to contact the clinic with any questions that may arise after the clinic visit.      Disclaimer: This note consists of symbols derived from keyboarding, dictation and/or voice recognition software. As a result, there may be errors in the script that have gone undetected. Please consider this when interpreting information found in this chart.       KADIE Smith.MARY.ELAN., F.A.C.F.A.S.      Again, thank you for allowing me to participate in the care of your patient.        Sincerely,        John Bernard DPM    Electronically signed

## 2025-02-10 NOTE — PROGRESS NOTES
PATIENT HISTORY:  Wilma Palacios is a 34 year old female who presents to clinic in consultation at the request of  Kasey Marquez C.N.P. with a chief complaint of bilateral foot pain  The patient is seen by themselves.  The patient relates the pain is primarily located around the top of metatarsals and ball of foot.  Reports insidious onset without acute precipitating event.  The patient relates that the symptoms have been going on for several month(s).  The patient has previously tried rest, elevation, tylenol and soaking in epsom salt, prednisone with little relief.  The patient is currently employed as  .  Any previous notes and studies that pertain to the patient's condition were reviewed.    Pertinent medical, surgical and family history was reviewed in the Epic chart.    Past Medical History:   Past Medical History:   Diagnosis Date    Ankylosing spondylitis with multisystem involvement (H) 2012    Anxiety     Rheumatoid arthritis (H) 11/19/2020    Thyroid disease 2008       Medications:   Current Outpatient Medications:     albuterol (PROAIR HFA/PROVENTIL HFA/VENTOLIN HFA) 108 (90 Base) MCG/ACT inhaler, Inhale 2 puffs into the lungs every 6 hours as needed for shortness of breath, wheezing or cough., Disp: 18 g, Rfl: 1    albuterol (PROVENTIL) (2.5 MG/3ML) 0.083% neb solution, Take 1 vial (2.5 mg) by nebulization every 6 hours as needed for shortness of breath, wheezing or cough., Disp: 90 mL, Rfl: 1    FARXIGA 10 MG TABS tablet, Take 10 mg by mouth daily. Not taking consistently., Disp: , Rfl:     fluconazole (DIFLUCAN) 150 MG tablet, Take 150 mg by mouth daily., Disp: , Rfl:     fluticasone (FLOVENT HFA) 110 MCG/ACT inhaler, Inhale 1-2 puffs into the lungs 2 times daily When you have upper respiratory infections, Disp: 12 g, Rfl: 1    hydrOXYzine (ATARAX) 25 MG tablet, Take 1 tablet (25 mg) by mouth every 6 hours as needed for anxiety, Disp: 30 tablet, Rfl: 1    metroNIDAZOLE  (METROGEL) 0.75 % vaginal gel, Place 1 applicator (5 g) vaginally daily, Disp: 70 g, Rfl: 3    ondansetron (ZOFRAN ODT) 4 MG ODT tab, Take 1 tablet (4 mg) by mouth every 8 hours as needed for nausea., Disp: 10 tablet, Rfl: 0    predniSONE (DELTASONE) 10 MG tablet, Take 10 mg by mouth daily., Disp: , Rfl:     predniSONE (DELTASONE) 5 MG tablet, Take 5 mg by mouth daily., Disp: , Rfl:     Semaglutide-Weight Management (WEGOVY) 1 MG/0.5ML pen, Inject 1 mg subcutaneously once a week., Disp: 2 mL, Rfl: 0    SIMPONI 50 MG/0.5ML auto-injector pen, Inject 50 mg subcutaneously every 28 days., Disp: , Rfl:     SYNTHROID 125 MCG tablet, Take 1 tablet (125 mcg) by mouth daily., Disp: 90 tablet, Rfl: 2    losartan (COZAAR) 100 MG tablet, Take 100 mg by mouth Currently taking 50mg 11/02/23, has not started 100mg yet, Disp: , Rfl:      Allergies:    Allergies   Allergen Reactions    Acetic Acid Anaphylaxis, Itching and Rash    Ascorbate Anaphylaxis     All Fruits.    Food Other (See Comments)     Berries (fruit)  Throat closes, itchy    Lycopene      Other reaction(s): Angioedema    Nuts Swelling and Nausea and Vomiting    Pistachio Nut Extract Anaphylaxis    Sulfa Antibiotics     Other Drug Allergy (See Comments) Hives and Rash     BLEACH       Vitals: /73   Pulse 73   BMI= There is no height or weight on file to calculate BMI.    LOWER EXTREMITY PHYSICAL EXAM    Dermatologic: Skin is intact to right and left lower extremity without significant lesions, rash or abrasion.        Vascular: DP & PT pulses are intact & regular on the right and left.   CFT and skin temperature is normal to the right and left lower extremity.     Neurologic: Lower extremity sensation is intact to light touch.  No evidence of weakness in the right and left lower extremity.        Musculoskeletal: Patient is ambulatory without assistive device or brace.  No gross ankle deformity noted.  No foot or ankle joint effusion is noted.  Noted pain on  palpation under the lesser metatarsophalangeal joints bilaterally.  No surrounding erythema or edema noted.  Noted tight gastroc complex bilaterally.    Diagnostics:  Radiographs included three views of the left and right foot demonstrating   no cortical erosions or periosteal elevation.  All joint margins appear stable.  There is no apparent fracture or tumor formation noted.  There is no evidence of foreign body.  The images were independently reviewed by myself along with the patient explaining the findings.      ASSESSMENT / PLAN:     ICD-10-CM    1. Capsulitis of metatarsophalangeal (MTP) joint of left foot  M77.52 Orthotics, Mastectomy and Custom Compression Orders      2. Bilateral foot pain  M79.671 Orthopedic  Referral    M79.672       3. Capsulitis of metatarsophalangeal (MTP) joint of right foot  M77.51 Orthotics, Mastectomy and Custom Compression Orders          I have explained to Wilma about the conditions.  We discussed the underlying contributing factors to the condition as well as both conservative and surgical treatment options along with expected length of recovery.  At this time, the patient was educated on the importance of offloading supportive shoes and other devices.  I demonstrated to the patient calf stretches to perform every hour daily until symptoms resolve.  After symptoms resolve, the patient was advised to perform the stretches 3 times daily to prevent future recurrence.  The patient was instructed to perform warm soaks with Epson salt after which to also apply over-the-counter Voltaren gel to deeply massage the injured tissue.  The patient was instructed to do this on a daily basis until symptoms resolve.   The patient was prescribed custom orthotics that will aid in offloading the tension forces to the soft tissues and prevent further inflammation.   The patient may return in four weeks for reevaluation to determine if any further treatment will be needed.      Wilma  verbalized agreement with and understanding of the rational for the diagnosis and treatment plan.  All questions were answered to best of my ability and the patient's satisfaction. The patient was advised to contact the clinic with any questions that may arise after the clinic visit.      Disclaimer: This note consists of symbols derived from keyboarding, dictation and/or voice recognition software. As a result, there may be errors in the script that have gone undetected. Please consider this when interpreting information found in this chart.       RAHUL Bernard D.P.M., F.A.C.F.A.S.

## 2025-02-10 NOTE — PATIENT INSTRUCTIONS

## 2025-02-11 ENCOUNTER — LAB (OUTPATIENT)
Dept: LAB | Facility: CLINIC | Age: 35
End: 2025-02-11
Payer: COMMERCIAL

## 2025-02-11 ENCOUNTER — MYC MEDICAL ADVICE (OUTPATIENT)
Dept: FAMILY MEDICINE | Facility: CLINIC | Age: 35
End: 2025-02-11

## 2025-02-11 DIAGNOSIS — Z13.6 SCREENING FOR CARDIOVASCULAR CONDITION: Primary | ICD-10-CM

## 2025-02-11 DIAGNOSIS — N89.8 VAGINAL IRRITATION: ICD-10-CM

## 2025-02-11 DIAGNOSIS — N18.31 STAGE 3A CHRONIC KIDNEY DISEASE (H): ICD-10-CM

## 2025-02-11 LAB
CHOLEST SERPL-MCNC: 180 MG/DL
CLUE CELLS: ABNORMAL
HDLC SERPL-MCNC: 38 MG/DL
LDLC SERPL CALC-MCNC: 115 MG/DL
NONHDLC SERPL-MCNC: 142 MG/DL
TRICHOMONAS, WET PREP: ABNORMAL
TRIGL SERPL-MCNC: 133 MG/DL
WBC'S/HIGH POWER FIELD, WET PREP: ABNORMAL
YEAST, WET PREP: ABNORMAL

## 2025-02-11 PROCEDURE — 87210 SMEAR WET MOUNT SALINE/INK: CPT

## 2025-02-11 RX ORDER — FLUCONAZOLE 150 MG/1
TABLET ORAL
Qty: 30 TABLET | Refills: 0 | OUTPATIENT
Start: 2025-02-11

## 2025-02-12 ENCOUNTER — MYC MEDICAL ADVICE (OUTPATIENT)
Dept: FAMILY MEDICINE | Facility: CLINIC | Age: 35
End: 2025-02-12
Payer: COMMERCIAL

## 2025-02-12 NOTE — TELEPHONE ENCOUNTER
Pt wanted to send message back to provider that this med and the Metrogel that was put on for the reoccurring infections that has been picking up every 2 weeks has been working and they haven't had any infections for the past 4- almost 5 months because of it. Patient stated that she and provider had a conversation that this was supposed to be one that was supposed to stay on med list so wondering why it is not being refilled, though she did just  a refill.         Also, patient thought she was just in over the summer for a PE as she comes in with her daughter to have those done and never misses she stated. Do you want us to call and have that scheduled anyway as not seeing any PE in past while that has been done or would you like this done as a med check?

## 2025-02-18 ENCOUNTER — PATIENT OUTREACH (OUTPATIENT)
Dept: CARE COORDINATION | Facility: CLINIC | Age: 35
End: 2025-02-18

## 2025-02-20 DIAGNOSIS — B96.89 BV (BACTERIAL VAGINOSIS): ICD-10-CM

## 2025-02-20 DIAGNOSIS — N76.0 BV (BACTERIAL VAGINOSIS): ICD-10-CM

## 2025-02-20 RX ORDER — FLUCONAZOLE 150 MG/1
TABLET ORAL
Qty: 30 TABLET | Refills: 0 | OUTPATIENT
Start: 2025-02-20

## 2025-02-20 RX ORDER — METRONIDAZOLE 7.5 MG/G
GEL VAGINAL
Qty: 70 G | Refills: 3 | OUTPATIENT
Start: 2025-02-20

## 2025-02-21 ENCOUNTER — TELEPHONE (OUTPATIENT)
Dept: FAMILY MEDICINE | Facility: CLINIC | Age: 35
End: 2025-02-21

## 2025-02-21 NOTE — TELEPHONE ENCOUNTER
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 PA:  Previously Tried and Failed:    Rationale:      Insurance   Primary:   Insurance ID:    Paid/[employer]  Code: 4000  ID: [id number]  Group #[id number]  Bin#[id number]      Secondary (if applicable):  Insurance ID:      Pharmacy Information (if different than what is on RX)  Name:  [facility]   Phone:  [phone]  Fax:[phone]    Clinic Information  Preferred routing pool for dept communication:

## 2025-02-26 NOTE — TELEPHONE ENCOUNTER
PA Initiation    Medication: FLUCONAZOLE 150 MG PO TABS  Insurance Company: [employer] Non-Specialty PA's - Phone [phone] Fax [phone]  Pharmacy Filling the Rx: [facility] [address]  Filling Pharmacy Phone: [phone]  Filling Pharmacy Fax: [phone]  Start Date: 2/25/2025    Plan has a qty limit of 2 tablets per fill. Requesting 4 tabs per fill so patient can get a full month's supply.    QTY LIMIT PA INITIATED OVER THE PHONE  Case Id: [id number]

## 2025-02-27 NOTE — TELEPHONE ENCOUNTER
Prior Authorization Approval    Medication: FLUCONAZOLE 150 MG PO TABS  Authorization Effective Date: 1/26/2025  Authorization Expiration Date: 2/25/2026  Approved Dose/Quantity:       Pharmacy has a paid claim.    Reference #:     Insurance Company: [employer] Non-Specialty PA's - Phone [phone] Fax [phone]  Expected CoPay: $    CoPay Card Available:      Financial Assistance Needed:   Which Pharmacy is filling the prescription: [facility] [place], [place] - [address]  Pharmacy Notified: YES  Patient Notified: Instructed pharmacy to notify patient once order is ready.

## 2025-02-28 PROBLEM — U07.1 CLINICAL DIAGNOSIS OF COVID-19: Status: RESOLVED | Noted: 2021-05-03 | Resolved: 2025-02-28

## 2025-02-28 PROBLEM — Z12.4 CERVICAL CANCER SCREENING: Status: RESOLVED | Noted: 2020-11-25 | Resolved: 2025-02-28

## 2025-02-28 PROBLEM — O20.8 SUBCHORIONIC HEMORRHAGE IN FIRST TRIMESTER: Status: RESOLVED | Noted: 2022-01-22 | Resolved: 2025-02-28

## 2025-03-25 ENCOUNTER — MYC MEDICAL ADVICE (OUTPATIENT)
Dept: FAMILY MEDICINE | Facility: CLINIC | Age: 35
End: 2025-03-25
Payer: COMMERCIAL

## 2025-03-25 DIAGNOSIS — M79.671 BILATERAL FOOT PAIN: Primary | ICD-10-CM

## 2025-03-25 DIAGNOSIS — M79.672 BILATERAL FOOT PAIN: Primary | ICD-10-CM

## 2025-03-26 NOTE — TELEPHONE ENCOUNTER
2/10/25 it appears patient saw podiatry Dr. Bernard.     Is there a different foot doctor that has been discussed?    Please advise.    Francisca Dubon RN on 3/26/2025 at 11:24 AM

## 2025-03-27 ENCOUNTER — PATIENT OUTREACH (OUTPATIENT)
Dept: CARE COORDINATION | Facility: CLINIC | Age: 35
End: 2025-03-27
Payer: COMMERCIAL

## 2025-03-31 ENCOUNTER — PATIENT OUTREACH (OUTPATIENT)
Dept: CARE COORDINATION | Facility: CLINIC | Age: 35
End: 2025-03-31
Payer: COMMERCIAL

## 2025-04-26 ENCOUNTER — MYC MEDICAL ADVICE (OUTPATIENT)
Dept: FAMILY MEDICINE | Facility: CLINIC | Age: 35
End: 2025-04-26
Payer: COMMERCIAL

## 2025-04-26 DIAGNOSIS — M06.9 RHEUMATOID ARTHRITIS, INVOLVING UNSPECIFIED SITE, UNSPECIFIED WHETHER RHEUMATOID FACTOR PRESENT (H): ICD-10-CM

## 2025-04-26 DIAGNOSIS — M47.899 HLA-B27 SPONDYLOARTHROPATHY: ICD-10-CM

## 2025-04-26 DIAGNOSIS — E66.01 MORBID OBESITY (H): ICD-10-CM

## 2025-05-06 ENCOUNTER — LAB (OUTPATIENT)
Dept: LAB | Facility: CLINIC | Age: 35
End: 2025-05-06
Payer: COMMERCIAL

## 2025-05-06 DIAGNOSIS — N89.8 VAGINAL IRRITATION: ICD-10-CM

## 2025-05-06 LAB
CLUE CELLS: ABNORMAL
TRICHOMONAS, WET PREP: ABNORMAL
WBC'S/HIGH POWER FIELD, WET PREP: ABNORMAL
YEAST, WET PREP: ABNORMAL

## 2025-05-06 PROCEDURE — 87210 SMEAR WET MOUNT SALINE/INK: CPT

## 2025-05-07 ENCOUNTER — MYC MEDICAL ADVICE (OUTPATIENT)
Dept: FAMILY MEDICINE | Facility: CLINIC | Age: 35
End: 2025-05-07
Payer: COMMERCIAL

## 2025-05-07 DIAGNOSIS — M47.899 HLA-B27 SPONDYLOARTHROPATHY: ICD-10-CM

## 2025-05-07 DIAGNOSIS — M06.9 RHEUMATOID ARTHRITIS, INVOLVING UNSPECIFIED SITE, UNSPECIFIED WHETHER RHEUMATOID FACTOR PRESENT (H): ICD-10-CM

## 2025-05-07 DIAGNOSIS — E66.01 MORBID OBESITY (H): ICD-10-CM

## 2025-05-08 RX ORDER — PHENTERMINE HYDROCHLORIDE 15 MG/1
15 CAPSULE ORAL EVERY MORNING
Qty: 30 CAPSULE | Refills: 5 | Status: SHIPPED | OUTPATIENT
Start: 2025-05-08

## 2025-07-09 ENCOUNTER — TELEPHONE (OUTPATIENT)
Dept: FAMILY MEDICINE | Facility: CLINIC | Age: 35
End: 2025-07-09

## 2025-07-09 NOTE — TELEPHONE ENCOUNTER
Appt made for pt at 5pm today in [facility] - Reiterated with pt that she MUST be here by 5:00pm kaelyn for lab draw.

## 2025-07-09 NOTE — TELEPHONE ENCOUNTER
Reason for Call:  Appointment Request    Patient requesting this type of appt:  LAB    Requested provider: LAB    Reason patient unable to be scheduled: Not within requested timeframe    When does patient want to be seen/preferred time: Same day    Comments: Patient would like to schedule lab appointment today in the evening about 5 pm or later at the [facility]. Lab orders from [facility].    Could we send this information to you in [place] or would you prefer to receive a phone call?:   Patient would prefer a phone call   Okay to leave a detailed message?: Yes at Cell number on file:    Telephone Information:   Mobile [phone]       Call taken on 7/9/2025 at 9:41 AM by Isabel Chatman

## 2025-07-13 ENCOUNTER — HEALTH MAINTENANCE LETTER (OUTPATIENT)
Age: 35
End: 2025-07-13

## 2025-08-21 ENCOUNTER — E-VISIT (OUTPATIENT)
Dept: FAMILY MEDICINE | Facility: CLINIC | Age: 35
End: 2025-08-21
Payer: COMMERCIAL

## 2025-08-21 DIAGNOSIS — J02.9 SORE THROAT: Primary | ICD-10-CM

## 2025-08-21 PROCEDURE — 99207 PR NON-BILLABLE SERV PER CHARTING: CPT | Performed by: PHYSICIAN ASSISTANT
